# Patient Record
Sex: MALE | Race: WHITE | NOT HISPANIC OR LATINO | Employment: OTHER | URBAN - METROPOLITAN AREA
[De-identification: names, ages, dates, MRNs, and addresses within clinical notes are randomized per-mention and may not be internally consistent; named-entity substitution may affect disease eponyms.]

---

## 2017-01-19 ENCOUNTER — COMMUNICATION - HEALTHEAST (OUTPATIENT)
Dept: CARDIOLOGY | Facility: CLINIC | Age: 77
End: 2017-01-19

## 2017-01-19 DIAGNOSIS — I48.91 ATRIAL FIBRILLATION WITH RAPID VENTRICULAR RESPONSE (H): ICD-10-CM

## 2017-03-22 ENCOUNTER — OFFICE VISIT - HEALTHEAST (OUTPATIENT)
Dept: CARDIOLOGY | Facility: CLINIC | Age: 77
End: 2017-03-22

## 2017-03-22 DIAGNOSIS — R42 DIZZINESS: ICD-10-CM

## 2017-03-22 DIAGNOSIS — I42.8 NONISCHEMIC CARDIOMYOPATHY (H): ICD-10-CM

## 2017-03-22 DIAGNOSIS — G47.33 OSA ON CPAP: ICD-10-CM

## 2017-03-22 DIAGNOSIS — I48.0 PAROXYSMAL ATRIAL FIBRILLATION (H): ICD-10-CM

## 2017-03-22 LAB
ATRIAL RATE - MUSE: 500 BPM
DIASTOLIC BLOOD PRESSURE - MUSE: NORMAL MMHG
INTERPRETATION ECG - MUSE: NORMAL
P AXIS - MUSE: NORMAL DEGREES
PR INTERVAL - MUSE: NORMAL MS
QRS DURATION - MUSE: 132 MS
QT - MUSE: 426 MS
QTC - MUSE: 456 MS
R AXIS - MUSE: 51 DEGREES
SYSTOLIC BLOOD PRESSURE - MUSE: NORMAL MMHG
T AXIS - MUSE: 11 DEGREES
VENTRICULAR RATE- MUSE: 69 BPM

## 2017-03-22 ASSESSMENT — MIFFLIN-ST. JEOR: SCORE: 1819.42

## 2017-03-24 ENCOUNTER — HOSPITAL ENCOUNTER (OUTPATIENT)
Dept: CARDIOLOGY | Facility: CLINIC | Age: 77
Discharge: HOME | End: 2017-03-24
Attending: INTERNAL MEDICINE

## 2017-03-24 DIAGNOSIS — I48.0 PAROXYSMAL ATRIAL FIBRILLATION (H): ICD-10-CM

## 2017-04-05 ENCOUNTER — COMMUNICATION - HEALTHEAST (OUTPATIENT)
Dept: CARDIOLOGY | Facility: CLINIC | Age: 77
End: 2017-04-05

## 2017-04-05 DIAGNOSIS — I48.91 ATRIAL FIBRILLATION WITH RAPID VENTRICULAR RESPONSE (H): ICD-10-CM

## 2017-04-19 ENCOUNTER — OFFICE VISIT - HEALTHEAST (OUTPATIENT)
Dept: CARDIOLOGY | Facility: CLINIC | Age: 77
End: 2017-04-19

## 2017-04-19 DIAGNOSIS — G47.33 OSA ON CPAP: ICD-10-CM

## 2017-04-19 DIAGNOSIS — I48.19 PERSISTENT ATRIAL FIBRILLATION (H): ICD-10-CM

## 2017-04-19 DIAGNOSIS — I42.8 NONISCHEMIC CARDIOMYOPATHY (H): ICD-10-CM

## 2017-04-19 ASSESSMENT — MIFFLIN-ST. JEOR: SCORE: 1819.42

## 2017-07-11 ENCOUNTER — COMMUNICATION - HEALTHEAST (OUTPATIENT)
Dept: CARDIOLOGY | Facility: CLINIC | Age: 77
End: 2017-07-11

## 2017-07-11 DIAGNOSIS — I48.91 ATRIAL FIBRILLATION WITH RAPID VENTRICULAR RESPONSE (H): ICD-10-CM

## 2017-08-21 ENCOUNTER — COMMUNICATION - HEALTHEAST (OUTPATIENT)
Dept: ADMINISTRATIVE | Facility: CLINIC | Age: 77
End: 2017-08-21

## 2017-09-21 ENCOUNTER — OFFICE VISIT - HEALTHEAST (OUTPATIENT)
Dept: CARDIOLOGY | Facility: CLINIC | Age: 77
End: 2017-09-21

## 2017-09-21 DIAGNOSIS — I42.8 NONISCHEMIC CARDIOMYOPATHY (H): ICD-10-CM

## 2017-09-21 DIAGNOSIS — I48.20 CHRONIC ATRIAL FIBRILLATION (H): ICD-10-CM

## 2017-09-21 DIAGNOSIS — G47.33 OSA ON CPAP: ICD-10-CM

## 2017-09-21 ASSESSMENT — MIFFLIN-ST. JEOR: SCORE: 1821.69

## 2017-10-18 ENCOUNTER — AMBULATORY - HEALTHEAST (OUTPATIENT)
Dept: CARDIOLOGY | Facility: CLINIC | Age: 77
End: 2017-10-18

## 2017-10-18 DIAGNOSIS — I50.31 CHF (CONGESTIVE HEART FAILURE), NYHA CLASS I, ACUTE, DIASTOLIC (H): ICD-10-CM

## 2017-10-18 DIAGNOSIS — Z00.6 CLINICAL TRIAL PARTICIPANT: ICD-10-CM

## 2017-12-15 ENCOUNTER — COMMUNICATION - HEALTHEAST (OUTPATIENT)
Dept: CARDIOLOGY | Facility: CLINIC | Age: 77
End: 2017-12-15

## 2018-01-03 ENCOUNTER — RECORDS - HEALTHEAST (OUTPATIENT)
Dept: ADMINISTRATIVE | Facility: OTHER | Age: 78
End: 2018-01-03

## 2018-01-08 ASSESSMENT — MIFFLIN-ST. JEOR: SCORE: 1808.08

## 2018-01-11 ENCOUNTER — ANESTHESIA - HEALTHEAST (OUTPATIENT)
Dept: SURGERY | Facility: CLINIC | Age: 78
End: 2018-01-11

## 2018-01-11 ENCOUNTER — SURGERY - HEALTHEAST (OUTPATIENT)
Dept: SURGERY | Facility: CLINIC | Age: 78
End: 2018-01-11

## 2018-01-12 ASSESSMENT — MIFFLIN-ST. JEOR: SCORE: 1808.08

## 2018-02-05 ENCOUNTER — COMMUNICATION - HEALTHEAST (OUTPATIENT)
Dept: CARDIOLOGY | Facility: CLINIC | Age: 78
End: 2018-02-05

## 2018-02-05 DIAGNOSIS — I48.91 ATRIAL FIBRILLATION WITH RAPID VENTRICULAR RESPONSE (H): ICD-10-CM

## 2018-02-27 ENCOUNTER — COMMUNICATION - HEALTHEAST (OUTPATIENT)
Dept: CARDIOLOGY | Facility: CLINIC | Age: 78
End: 2018-02-27

## 2018-03-07 ENCOUNTER — COMMUNICATION - HEALTHEAST (OUTPATIENT)
Dept: CARDIOLOGY | Facility: CLINIC | Age: 78
End: 2018-03-07

## 2018-03-07 DIAGNOSIS — I48.19 PERSISTENT ATRIAL FIBRILLATION (H): ICD-10-CM

## 2018-05-16 ENCOUNTER — COMMUNICATION - HEALTHEAST (OUTPATIENT)
Dept: CARDIOLOGY | Facility: CLINIC | Age: 78
End: 2018-05-16

## 2018-08-07 ENCOUNTER — OFFICE VISIT - HEALTHEAST (OUTPATIENT)
Dept: CARDIOLOGY | Facility: CLINIC | Age: 78
End: 2018-08-07

## 2018-08-07 DIAGNOSIS — I42.8 NONISCHEMIC CARDIOMYOPATHY (H): ICD-10-CM

## 2018-08-07 DIAGNOSIS — I48.19 PERSISTENT ATRIAL FIBRILLATION (H): ICD-10-CM

## 2018-08-07 DIAGNOSIS — I50.9 CONGESTIVE HEART FAILURE (H): ICD-10-CM

## 2018-08-07 DIAGNOSIS — G47.33 OSA ON CPAP: ICD-10-CM

## 2018-08-07 LAB
ANION GAP SERPL CALCULATED.3IONS-SCNC: 9 MMOL/L (ref 5–18)
BNP SERPL-MCNC: 81 PG/ML (ref 0–76)
BUN SERPL-MCNC: 18 MG/DL (ref 8–28)
CALCIUM SERPL-MCNC: 9.6 MG/DL (ref 8.5–10.5)
CHLORIDE BLD-SCNC: 105 MMOL/L (ref 98–107)
CO2 SERPL-SCNC: 23 MMOL/L (ref 22–31)
CREAT SERPL-MCNC: 0.87 MG/DL (ref 0.7–1.3)
GFR SERPL CREATININE-BSD FRML MDRD: >60 ML/MIN/1.73M2
GLUCOSE BLD-MCNC: 109 MG/DL (ref 70–125)
POTASSIUM BLD-SCNC: 4.2 MMOL/L (ref 3.5–5)
SODIUM SERPL-SCNC: 137 MMOL/L (ref 136–145)

## 2018-08-07 RX ORDER — KETOROLAC TROMETHAMINE 4 MG/ML
1 SOLUTION/ DROPS OPHTHALMIC 2 TIMES DAILY
Status: SHIPPED | COMMUNITY
Start: 2018-08-06 | End: 2022-10-10

## 2018-08-07 RX ORDER — PREDNISOLONE ACETATE 10 MG/ML
1 SUSPENSION/ DROPS OPHTHALMIC 2 TIMES DAILY
Status: SHIPPED | COMMUNITY
Start: 2018-07-24 | End: 2022-10-10

## 2018-08-07 RX ORDER — LATANOPROST 50 UG/ML
1 SOLUTION/ DROPS OPHTHALMIC AT BEDTIME
Status: SHIPPED | COMMUNITY
Start: 2018-06-18

## 2018-08-07 ASSESSMENT — MIFFLIN-ST. JEOR: SCORE: 1839.83

## 2018-08-22 ENCOUNTER — HOSPITAL ENCOUNTER (OUTPATIENT)
Dept: CARDIOLOGY | Facility: CLINIC | Age: 78
Discharge: HOME | End: 2018-08-22
Attending: INTERNAL MEDICINE

## 2018-08-22 DIAGNOSIS — I42.8 NONISCHEMIC CARDIOMYOPATHY (H): ICD-10-CM

## 2018-08-22 LAB
AORTIC VALVE MEAN VELOCITY: 139 CM/S
AV DIMENSIONLESS INDEX VTI: 0.4
AV MEAN GRADIENT: 9 MMHG
AV PEAK GRADIENT: 17.8 MMHG
AV VALVE AREA: 1.3 CM2
AV VELOCITY RATIO: 0.5
BSA FOR ECHO PROCEDURE: 2.34 M2
CV ECHO HEIGHT: 71 IN
CV ECHO WEIGHT: 242 LBS
DOP CALC AO PEAK VEL: 211 CM/S
DOP CALC AO VTI: 43.4 CM
DOP CALC LVOT AREA: 3.14 CM2
DOP CALC LVOT DIAMETER: 2 CM
DOP CALC LVOT PEAK VEL: 96.3 CM/S
DOP CALC LVOT STROKE VOLUME: 57.8 CM3
DOP CALCLVOT PEAK VEL VTI: 18.4 CM
ECHO EJECTION FRACTION ESTIMATED: 50 %
FRACTIONAL SHORTENING: 24.6 % (ref 28–44)
INTERVENTRICULAR SEPTUM IN END DIASTOLE: 1.49 CM (ref 0.6–1)
IVS/PW RATIO: 1
LA AREA 1: 27.4 CM2
LA AREA 2: 28.6 CM2
LEFT ATRIUM LENGTH: 6.33 CM
LEFT ATRIUM VOLUME INDEX: 45 ML/M2
LEFT ATRIUM VOLUME: 105.2 ML
LEFT VENTRICLE CARDIAC INDEX: 2.1 L/MIN/M2
LEFT VENTRICLE CARDIAC OUTPUT: 4.9 L/MIN
LEFT VENTRICLE HEART RATE: 85 BPM
LEFT VENTRICLE MASS INDEX: 134.1 G/M2
LEFT VENTRICULAR INTERNAL DIMENSION IN DIASTOLE: 4.91 CM (ref 4.2–5.8)
LEFT VENTRICULAR INTERNAL DIMENSION IN SYSTOLE: 3.7 CM (ref 2.5–4)
LEFT VENTRICULAR MASS: 313.9 G
LEFT VENTRICULAR OUTFLOW TRACT MEAN GRADIENT: 2 MMHG
LEFT VENTRICULAR OUTFLOW TRACT MEAN VELOCITY: 68 CM/S
LEFT VENTRICULAR OUTFLOW TRACT PEAK GRADIENT: 4 MMHG
LEFT VENTRICULAR POSTERIOR WALL IN END DIASTOLE: 1.51 CM (ref 0.6–1)
LV STROKE VOLUME INDEX: 24.7 ML/M2
MV AVERAGE E/E' RATIO: 12.3 CM/S
MV DECELERATION TIME: 194 MS
MV E'TISSUE VEL-LAT: 10 CM/S
MV E'TISSUE VEL-MED: 7.99 CM/S
MV LATERAL E/E' RATIO: 11.1
MV MEDIAL E/E' RATIO: 13.9
MV PEAK E VELOCITY: 111 CM/S
NUC REST DIASTOLIC VOLUME INDEX: 3872 LBS
NUC REST SYSTOLIC VOLUME INDEX: 71 IN
PR MAX PG: 6 MMHG
PR PEAK VELOCITY: 126 CM/S
TRICUSPID REGURGITATION PEAK PRESSURE GRADIENT: 25.4 MMHG
TRICUSPID VALVE ANULAR PLANE SYSTOLIC EXCURSION: 1.7 CM
TRICUSPID VALVE PEAK REGURGITANT VELOCITY: 252 CM/S

## 2018-08-22 ASSESSMENT — MIFFLIN-ST. JEOR: SCORE: 1839.83

## 2018-09-05 ENCOUNTER — COMMUNICATION - HEALTHEAST (OUTPATIENT)
Dept: CARDIOLOGY | Facility: CLINIC | Age: 78
End: 2018-09-05

## 2018-09-05 DIAGNOSIS — I48.19 PERSISTENT ATRIAL FIBRILLATION (H): ICD-10-CM

## 2018-09-05 DIAGNOSIS — I48.91 ATRIAL FIBRILLATION WITH RAPID VENTRICULAR RESPONSE (H): ICD-10-CM

## 2018-10-10 ENCOUNTER — AMBULATORY - HEALTHEAST (OUTPATIENT)
Dept: CARDIOLOGY | Facility: CLINIC | Age: 78
End: 2018-10-10

## 2018-10-10 ASSESSMENT — MIFFLIN-ST. JEOR: SCORE: 1817.15

## 2019-02-25 ENCOUNTER — COMMUNICATION - HEALTHEAST (OUTPATIENT)
Dept: CARDIOLOGY | Facility: CLINIC | Age: 79
End: 2019-02-25

## 2019-02-25 DIAGNOSIS — I48.91 ATRIAL FIBRILLATION WITH RAPID VENTRICULAR RESPONSE (H): ICD-10-CM

## 2019-03-19 ENCOUNTER — OFFICE VISIT - HEALTHEAST (OUTPATIENT)
Dept: CARDIOLOGY | Facility: CLINIC | Age: 79
End: 2019-03-19

## 2019-03-19 DIAGNOSIS — I48.20 CHRONIC ATRIAL FIBRILLATION (H): ICD-10-CM

## 2019-03-19 DIAGNOSIS — I42.8 NONISCHEMIC CARDIOMYOPATHY (H): ICD-10-CM

## 2019-03-19 DIAGNOSIS — G47.33 OSA (OBSTRUCTIVE SLEEP APNEA): ICD-10-CM

## 2019-03-19 ASSESSMENT — MIFFLIN-ST. JEOR: SCORE: 1871.59

## 2019-03-20 ENCOUNTER — COMMUNICATION - HEALTHEAST (OUTPATIENT)
Dept: CARDIOLOGY | Facility: CLINIC | Age: 79
End: 2019-03-20

## 2019-03-25 ENCOUNTER — COMMUNICATION - HEALTHEAST (OUTPATIENT)
Dept: CARDIOLOGY | Facility: CLINIC | Age: 79
End: 2019-03-25

## 2019-03-25 DIAGNOSIS — I48.19 PERSISTENT ATRIAL FIBRILLATION (H): ICD-10-CM

## 2019-03-25 RX ORDER — METOPROLOL SUCCINATE 25 MG/1
TABLET, EXTENDED RELEASE ORAL
Qty: 90 TABLET | Refills: 1 | Status: ON HOLD | OUTPATIENT
Start: 2019-03-25 | End: 2022-10-12

## 2019-04-22 ENCOUNTER — COMMUNICATION - HEALTHEAST (OUTPATIENT)
Dept: CARDIOLOGY | Facility: CLINIC | Age: 79
End: 2019-04-22

## 2019-08-07 ENCOUNTER — COMMUNICATION - HEALTHEAST (OUTPATIENT)
Dept: CARDIOLOGY | Facility: CLINIC | Age: 79
End: 2019-08-07

## 2019-08-07 DIAGNOSIS — I48.91 ATRIAL FIBRILLATION WITH RAPID VENTRICULAR RESPONSE (H): ICD-10-CM

## 2019-10-16 ENCOUNTER — COMMUNICATION - HEALTHEAST (OUTPATIENT)
Dept: CARDIOLOGY | Facility: CLINIC | Age: 79
End: 2019-10-16

## 2019-10-16 RX ORDER — FUROSEMIDE 20 MG
20 TABLET ORAL DAILY PRN
Status: ON HOLD | COMMUNITY
Start: 2019-10-16 | End: 2024-07-14

## 2019-10-18 ENCOUNTER — AMBULATORY - HEALTHEAST (OUTPATIENT)
Dept: CARDIOLOGY | Facility: CLINIC | Age: 79
End: 2019-10-18

## 2019-10-18 DIAGNOSIS — Z00.6 CLINICAL TRIAL PARTICIPANT: ICD-10-CM

## 2019-10-18 DIAGNOSIS — I48.20 CHRONIC ATRIAL FIBRILLATION (H): ICD-10-CM

## 2019-10-18 DIAGNOSIS — I48.19 PERSISTENT ATRIAL FIBRILLATION (H): ICD-10-CM

## 2019-10-18 ASSESSMENT — MIFFLIN-ST. JEOR: SCORE: 1813.75

## 2019-10-19 LAB
ATRIAL RATE - MUSE: 33 BPM
DIASTOLIC BLOOD PRESSURE - MUSE: NORMAL
INTERPRETATION ECG - MUSE: NORMAL
P AXIS - MUSE: NORMAL
PR INTERVAL - MUSE: NORMAL
QRS DURATION - MUSE: 158 MS
QT - MUSE: 430 MS
QTC - MUSE: 477 MS
R AXIS - MUSE: 263 DEGREES
SYSTOLIC BLOOD PRESSURE - MUSE: NORMAL
T AXIS - MUSE: 8 DEGREES
VENTRICULAR RATE- MUSE: 74 BPM

## 2019-10-21 ENCOUNTER — AMBULATORY - HEALTHEAST (OUTPATIENT)
Dept: CARDIOLOGY | Facility: CLINIC | Age: 79
End: 2019-10-21

## 2019-10-23 ENCOUNTER — COMMUNICATION - HEALTHEAST (OUTPATIENT)
Dept: CARDIOLOGY | Facility: CLINIC | Age: 79
End: 2019-10-23

## 2021-05-27 NOTE — TELEPHONE ENCOUNTER
Called patient with recommendation below.    He is thankful for the advise and will return the call for an appointment.  His insurance is changing and he is not entirely sure he will be able to stay with [facility].     Encouraged to call with any further questions or concerns.

## 2021-05-27 NOTE — TELEPHONE ENCOUNTER
----- Message from Ashley Leon CNP sent at 3/25/2019 11:05 AM CDT -----  Contact: Pt  Please let Samantha know I received his message.  I think Dr. Malcolm Mitchell would be a good doctor for him to continue his cardiology care with.  If he has further questions or concerns, I can call him.       ----- Message -----  From: Wellington Kovacs  Sent: 3/25/2019   9:36 AM  To: Carrie Chew RN, #    General phone call:    Caller: Pt    Primary cardiologist: Dr Mitchell / Ashley Leon    Detailed reason for call: Pt wanted a call from Ashley if possible ------ in regards to a recommendation - Pt states Dr Feliz Mitchell is retiring and wanted to hear from Ashley as to whom would be best to see him - (Pt was referred to WERO Mitchell but wanted to check with Ashley)    Asking for a call back when possible please    New or active symptoms? na    Best phone number: [phone]    Best time to contact: any  Ok to leave a detailed message? Yes  Device? No    Additional Info: Thank you

## 2021-05-28 NOTE — TELEPHONE ENCOUNTER
----- Message from Ashley Leon CNP sent at 4/18/2019  2:32 PM CDT -----  I had a message on my VM from Samantha.  He saw Dr. Mitchell in March and knows that he needs to find a new cardiologist for follow up next March since Dr. Mitchell is retiring.  He is concerned about his refills (Dr. Mitchell only put in for 90 day supply and 1 refill).     Please call him and let him know refills can be for 1 year since he saw Dr. Mitchell.  I can see him in 6 months for HF follow up and I will make sure he gets a new cardiologist.    Thanks

## 2021-05-30 VITALS — HEIGHT: 72 IN | BODY MASS INDEX: 31.69 KG/M2 | WEIGHT: 234 LBS

## 2021-05-30 VITALS — HEIGHT: 72 IN | WEIGHT: 234 LBS | BODY MASS INDEX: 31.69 KG/M2

## 2021-05-31 VITALS — WEIGHT: 231 LBS | BODY MASS INDEX: 30.62 KG/M2 | HEIGHT: 73 IN

## 2021-05-31 VITALS — WEIGHT: 235 LBS | HEIGHT: 71 IN | BODY MASS INDEX: 32.9 KG/M2

## 2021-05-31 NOTE — TELEPHONE ENCOUNTER
Left VM for pt that refill sent for Eliquis. Informed him he needs lab work for Eliquis with new Cards MD.

## 2021-05-31 NOTE — TELEPHONE ENCOUNTER
----- Message from Wellington Kovacs sent at 8/7/2019  9:50 AM CDT -----  Contact: Pt  Patient has already contacted their pharmacy. The medication or refill issue is below:    Primary Cardiologist: Dr Mitchell    Medication: ELIQUIS 5 mg Tab tablet     Issue / Concern: Needing refill - If able to do 90 days that would be great    Preferred Pharmacy: [facility] - [place], [place] - [address] [address]    Best Phone Number for Patient: home    Additional Info: Pt did state that per an insurance change and that Dr Mitchell is retiring soon he will be seeing a new cardiologist later this month- just wanted us to be aware [facility]

## 2021-06-01 VITALS — BODY MASS INDEX: 33.88 KG/M2 | WEIGHT: 242 LBS | HEIGHT: 71 IN

## 2021-06-01 VITALS — BODY MASS INDEX: 33.18 KG/M2 | WEIGHT: 237 LBS | HEIGHT: 71 IN

## 2021-06-02 VITALS — BODY MASS INDEX: 34.86 KG/M2 | WEIGHT: 249 LBS | HEIGHT: 71 IN

## 2021-06-02 NOTE — TELEPHONE ENCOUNTER
Zestar Study Consent Visit    Study description: ECG and PPG Study: Zestar Study      Samantha Ramos a 78 y.o. male , was contacted by today to discuss participation in the Zestar study.     The patient called the Clinical Trials Office to inquire about study participation.  The consent form was reviewed with the patient.  The review of the study included:    Study purpose     Conflict of interest    Device description      Study visits    Risks of participation    Benefits (if any)    Alternatives    Voluntary participation    Confidentiality     Compensation/costs of participation    Study stipends    Injury and legal rights    The subject was queried in regards to his willingness to continue and come in for scheduled appointment. his questions were answered to his satisfaction.   The patient has given his preliminary agreement to volunteer to participate in the above noted study.     Plan: Samantha Ramos will come to [facility] on 10/18/19 to continue consent process. If he continues to agrees to participate, the study visit will be done on the same day.    Sri Huntley RN

## 2021-06-02 NOTE — TELEPHONE ENCOUNTER
Subject called with concerns over vibrations on day phone and other general questions. All were answered to subject's satisfaction.

## 2021-06-02 NOTE — TELEPHONE ENCOUNTER
Mr. Singh was contact to thank him for his participation in the INVESTED study and inform Mr. Singh that the Data and Safety Monitoring Committee, impartial group that oversees the clinical trial, has decided that the trial has already collected enough information to answer the study question. Therefore, the National Institutes of Health ([facility]), the agency that that is sponsoring the trial, has decided to stop the trial early. This means we will not be enrolling patients this year and unfortunately, [facility] cannot provide you with a study influenza vaccine. Fortunately, there is no concern about the safety of either study vaccine. Getting your flu shot is important in order to keep you healthy this upcoming winter and spring. We recommend you get your annual flu shot from your health care provider.     Lawanda Daley

## 2021-06-03 VITALS
SYSTOLIC BLOOD PRESSURE: 123 MMHG | RESPIRATION RATE: 15 BRPM | HEART RATE: 70 BPM | HEIGHT: 70 IN | WEIGHT: 238.1 LBS | TEMPERATURE: 97.7 F | BODY MASS INDEX: 34.09 KG/M2 | DIASTOLIC BLOOD PRESSURE: 72 MMHG

## 2021-06-09 NOTE — PROGRESS NOTES
[facility] Progress Note    Assessment:    1.  Dizziness need to rule out periods of bradycardia as an etiology  2.  Recurrent persistent atrial fibrillation with good rate control  3.  Mild nonischemic cardiomyopathy compensated  4.  Obstructive sleep apnea using nocturnal CPAP    Plan:    We'll continue Eliquis therapy.  Will discontinue his metoprolol and evaluate for rate control off of metoprolol with a Holter monitor in the near future.  We'll obtain a follow-up echocardiogram in 6 months    An After Visit Summary was printed and given to the patient.    Subjective:    76-year-old male who was found to have atrial fibrillation one year ago undergone electrical cardioversion in March 2016.  Patient was noted have mild to moderate decrease in left ventricular systolic function with improvement in follow-up echocardiogram in August 2016 with ejection fraction of 45%.  Patient subsequently has been on metoprolol at 25 mg twice a day.  2-1/2 weeks ago he began having periods of dizziness lasting less than a minute this would occur upon waking up at night or occur with standing up in the morning or at various periods during the day.  These episodes of dizziness could occur every other day and sometimes twice a day without feeling of syncope or near-syncope.  Patient describes his dizziness as a feeling that his head is swirling without other associated symptoms      The patient had coronary CT angiography with calcium score in February 2016 with a calcium score 1 and no significant coronary artery stenosis present    Problem List:    Patient Active Problem List   Diagnosis     Paroxysmal atrial fibrillation     Congestive heart failure     JONATHAN on CPAP     Nonischemic cardiomyopathy         Current Outpatient Prescriptions   Medication Sig Dispense Refill     apixaban (ELIQUIS) 5 mg Tab tablet Take 1 tablet (5 mg total) by mouth 2 (two) times a day. 204 tablet 3     cholecalciferol, vitamin D3,  1,000 unit tablet Take 1,000 Units by mouth daily.       docoshexanoic acid-eicosapent 500 mg (FISH OIL) 500-100 mg cap capsule Take 1,000 mg by mouth daily.        GLUCOSAMINE HCL/CHONDR THOMAS A NA (OSTEO BI-FLEX ORAL) Take 1 capsule by mouth daily.       metoprolol tartrate (LOPRESSOR) 50 MG tablet Take 0.5 tablets (25 mg total) by mouth 2 (two) times a day. 90 tablet 2     multivitamin therapeutic (THERAGRAN) tablet Take 1 tablet by mouth daily.       omeprazole (PRILOSEC) 10 MG capsule Take 10 mg by mouth daily as needed. Takes a second dose of omeprazole later in the day if needed       No current facility-administered medications for this visit.        .  Past Surgical History:   Procedure Laterality Date     JOINT REPLACEMENT Left     knee       .  Social History     Social History     Marital status:      Spouse name: N/A     Number of children: N/A     Years of education: N/A     Occupational History     Not on file.     Social History Main Topics     Smoking status: Former Smoker     Packs/day: 3.00     Quit date: 9/10/1988     Smokeless tobacco: Not on file     Alcohol use 2.4 oz/week     4 Cans of beer per week      Comment: 4 beers a day     Drug use: No     Sexual activity: Not on file     Other Topics Concern     Not on file     Social History Narrative       .  Family History   Problem Relation Age of Onset     Pacemaker Father      Review of Systems:  General: WNL  Eyes: WNL  Ears/Nose/Throat: WNL  Lungs: WNL  Heart: WNL  Stomach: WNL  Bladder: WNL  Muscle/Joints: WNL  Skin: WNL  Nervous System: Dizziness  Mental Health: WNL     Blood: WNL    Objective:     Visit Vitals     /68 (Patient Site: Right Arm, Patient Position: Sitting, Cuff Size: Adult Large)     Pulse 60     Resp 18     Ht 6' (1.829 m)     Wt (!) 234 lb (106.1 kg)     BMI 31.74 kg/m2     (!) 234 lb (106.1 kg)   [unfilled]    Physical Exam:    GENERAL APPEARANCE: alert, no apparent distress  HEENT: no scleral icterus or  xanthelasma  NECK: jugular venous pressure normal  CHEST: symmetric, the lungs were clear to auscultation  CARDIOVASCULAR: irregular rhythm without murmur, click, or gallop; no carotid bruits  ABDOMEN: nondistended, nontender, bowel sounds present  EXTREMITIES: no cyanosis, clubbing or edema.    Cardiac Testing:    echocardiogram last performed August 15, 2016 with mild global hypokinesis with ejection fraction of 45%      ECG done in the office today revealed atrial fibrillation with a ventricular response of 69 bpm      Lab Results:    LIPIDS:  Lab Results   Component Value Date    CHOL 179 02/16/2016     Lab Results   Component Value Date    HDL 66 02/16/2016     Lab Results   Component Value Date    LDLCALC 96 02/16/2016     Lab Results   Component Value Date    TRIG 85 02/16/2016     No components found for: CHOLHDL    BMP:  Lab Results   Component Value Date    CREATININE 1.23 03/03/2016    BUN 30 (H) 03/03/2016     03/03/2016    K 4.9 03/03/2016     03/03/2016    CO2 25 03/03/2016         Feliz Mitchell MD,F.A.C.C.  [facility]  [phone]

## 2021-06-10 NOTE — PROGRESS NOTES
[facility]    Assessment/Plan:      Problem List Items Addressed This Visit     Persistent atrial fibrillation - Primary    Relevant Medications    metoprolol succinate (TOPROL-XL) 25 MG    JONATHAN on CPAP    Nonischemic cardiomyopathy    Relevant Medications    metoprolol succinate (TOPROL-XL) 25 MG         1.  Persistent atrial fibrillation: Recurrence noted in March 2017 with unknown onset.  Ventricular response appears well controlled with low-dose metoprolol.  He is asymptomatic, so recommend rate control.  Due to his tendency for lightheadedness, switch to metoprolol succinate 25 mg daily to be taken at night.  His lightheadedness has essentially resolved with increasing his water intake; he was encouraged to significantly increase his daily fluid intake and limit his alcohol consumption.  Continue Eliquis 5 mg twice daily for stroke prophylaxis.    2.  Obstructive sleep apnea: Consistent use of CPAP nightly.    3.  Nonischemic cardiomyopathy: Appears well compensated with no sign of acute fluid overload on exam today.      Follow-up with Dr. Mitchell in 3 months.    Subjective:      Samantha Ramos, a very pleasant 76-year-old gentleman, comes in today for EP evaluation of atrial fibrillation.  He has a history of persistent atrial fibrillation newly diagnosed in February 2016 which he underwent cardioversion on 3/18/2016.  Reports no symptomatic improvement following cardioversion.  He having periods of lightheadedness and was noted to have recurrence of atrial fibrillation on 3/22/2017, with unknown onset.  Metoprolol was discontinued and he subsequently wore a Holter monitor that showed elevated daytime ventricular response for which his metoprolol tartrate was restarted at 12.5 mg twice daily.  He has a history of lightheadedness associated with dehydration and hypotension, mild nonischemic cardiomyopathy, and obstructive sleep apnea for which he wears CPAP nightly.    Samantha states that he has no  awareness of arrhythmia, no decrease in activity tolerance, and that his lightheadedness has significantly improved.  He has started increasing his daily fluid intake as he was previously drinking less than half a glass of water daily.  He denies chest discomfort, palpitations, shortness of breath, paroxysmal nocturnal dyspnea, orthopnea, significant lightheadedness, dizziness, pre-syncope, or syncope.    Medical, surgical, family, social history, and medications were reviewed and updated as necessary.    Patient Active Problem List   Diagnosis     Persistent atrial fibrillation     Congestive heart failure     JONATHAN on CPAP     Nonischemic cardiomyopathy       Past Medical History:   Diagnosis Date     Arrhythmia      Hyperlipidemia      Sleep apnea        Past Surgical History:   Procedure Laterality Date     JOINT REPLACEMENT Left     knee       Allergies   Allergen Reactions     Indocin [Indomethacin] Itching       Current Outpatient Prescriptions   Medication Sig Dispense Refill     apixaban (ELIQUIS) 5 mg Tab tablet Take 1 tablet (5 mg total) by mouth 2 (two) times a day. 204 tablet 3     cholecalciferol, vitamin D3, 1,000 unit tablet Take 1,000 Units by mouth daily.       cyanocobalamin 1000 MCG tablet Take 1,000 mcg by mouth daily.       docoshexanoic acid-eicosapent 500 mg (FISH OIL) 500-100 mg cap capsule Take 1,000 mg by mouth daily.        GLUCOSAMINE HCL/CHONDR THOMAS A NA (OSTEO BI-FLEX ORAL) Take 1 capsule by mouth daily.       multivitamin therapeutic (THERAGRAN) tablet Take 1 tablet by mouth daily.       omeprazole (PRILOSEC) 10 MG capsule Take 10 mg by mouth daily as needed. Takes a second dose of omeprazole later in the day if needed       metoprolol succinate (TOPROL-XL) 25 MG Take 1 tablet (25 mg total) by mouth daily. 90 tablet 3     No current facility-administered medications for this visit.        Family History   Problem Relation Age of Onset     Pacemaker Father        Social History   Substance  Use Topics     Smoking status: Former Smoker     Packs/day: 3.00     Quit date: 9/10/1988     Smokeless tobacco: None     Alcohol use 2.4 oz/week     4 Cans of beer per week      Comment: 4 beers a day       Review of Systems:   General: WNL  Eyes: Visual Distubance  Ears/Nose/Throat: WNL  Lungs: WNL  Heart: WNL  Stomach: WNL  Bladder: WNL  Muscle/Joints: WNL  Skin: Poor Wound Healing  Nervous System: Dizziness, Loss of Balance  Mental Health: WNL     Blood: WNL      Objective:    /80 (Patient Site: Left Arm, Patient Position: Sitting, Cuff Size: Adult Large)  Pulse 64  Resp 18  Ht 6' (1.829 m)  Wt (!) 234 lb (106.1 kg)  BMI 31.74 kg/m2    Physical Exam  General Appearance:  Alert, well-appearing, in no acute distress.     HEENT:  Atraumatic, normocephalic; no scleral icterus, EOM intact; the mucous membranes were pink and moist.   Chest: Chest symmetric, spine straight.     Lungs:   Respirations unlabored; the lungs are clear to auscultation.   Cardiovascular:   Auscultation reveals normal first and second heart sounds with no murmurs, rubs, or gallops.  Irregularly irregular rate and rhythm. No JVD.  Radial and posterior tibial pulses are intact.    Abdomen:  Soft, nontender, nondistended, bowel sounds present.     Extremities: No cyanosis, clubbing, or edema.   Musculoskeletal:  Moves all extremities.     Skin: No xanthelasma.   Neurologic: Mood and affect are appropriate. Oriented to person, place, time, and situation.       Cardiographics   ECG: 3/22/17 was personally reviewed, shows atrial fibrillation with controlled ventricular response at 69 bpm.    24 hour Holter monitor worn 3/24/2017 showed persistent atrial fibrillation with elevated daytime ventricular rates frequently >100 bpm.  No episodes of significant bradycardia or pauses.  No significant ventricular arrhythmia.    Echocardiogram: Done 8/15/2016  Compared with the images of the exam of February 16, 2016, there has been  a slight  improvement in systolic function with the ejection fraction  increasing from 40% to 45%.  Global hypokinesis of the left ventricle with mild impairment of systolic  function.  Left ventricular ejection fraction is visually estimated to be 45%.  Mildly enlarged left atrium.  No significant valvular abnormalities.        Angie York, [facility], Electrophysiology  [phone]    This note has been dictated using voice recognition software. Any grammatical, typographical, or context distortions are unintentional and inherent to the software.

## 2021-06-13 NOTE — PROGRESS NOTES
Baseline Visit     Invested study (trial of Fluzone Quadrivalent Influenza Vaccine vs Fluzone High-Dose Trivalent Influenza Vaccine in reducing rate of death or hospitalization from heart- or lung-related causes in individuals with heart disease at high risk for more heart related events. High-Dose Trivalent Vaccine is investigational in adults under 65.).     Consent process:  Research associate met with subject to discuss participation in the above noted study.     The study discussion continued with an introduction of the study purpose and the qualifications for participation. A review of the study procedures, risks and side effects, benefits (if any), voluntary nature of participation, confidentiality of records, and financial considerations were also included in the discussion.      Subject was provided with a copy of the consent form to review and time to consider participation. Subject agreed to participate, consent form version: 29 JUN 2017 signed. Copy to chart, copy to patient.     Subject administered study vaccine at 9:26 am in the left arm.      Plan: Call patient in 1 week      Lawanda Daley  [phone]  [email or web address]

## 2021-06-13 NOTE — PROGRESS NOTES
[facility] Progress Note    Assessment:    1.  Chronic atrial fibrillation with good rate control and low-dose metoprolol therapy  2.  Mild nonischemic cardiomyopathy with ejection fraction of 45% compensated  3.  Obstructive sleep apnea on nocturnal CPAP    Plan:    Continue the patient's current medical regimen.  Will obtain a follow-up echocardiogram in 1 year to recheck his ejection fraction    An After Visit Summary was printed and given to the patient.    Subjective:    76-year-old male who was noted to have atrial fibrillation in March 2016 undergoing electrical cardioversion.  Patient had a underlying cardiomyopathy with ejection fraction of 40% at that time.  He developed recurrent atrial fibrillation this past March and is subsequently been following a rate control strategy.  Over last 6 months he denies any symptoms of congestive heart failure or limiting dyspnea on exertion    Problem List:    Patient Active Problem List   Diagnosis     Persistent atrial fibrillation     Congestive heart failure     JONATHAN on CPAP     Nonischemic cardiomyopathy         Current Outpatient Prescriptions   Medication Sig Dispense Refill     cholecalciferol, vitamin D3, 1,000 unit tablet Take 1,000 Units by mouth daily.       cyanocobalamin 1000 MCG tablet Take 1,000 mcg by mouth daily.       docoshexanoic acid-eicosapent 500 mg (FISH OIL) 500-100 mg cap capsule Take 1,000 mg by mouth daily.        ELIQUIS 5 mg Tab tablet TAKE ONE TABLET BY MOUTH TWICE A  tablet 2     GLUCOSAMINE HCL/CHONDR THOMAS A NA (OSTEO BI-FLEX ORAL) Take 1 capsule by mouth daily.       metoprolol succinate (TOPROL-XL) 25 MG Take 1 tablet (25 mg total) by mouth daily. 90 tablet 3     multivitamin therapeutic (THERAGRAN) tablet Take 1 tablet by mouth daily.       omeprazole (PRILOSEC) 10 MG capsule Take 10 mg by mouth daily as needed. Takes a second dose of omeprazole later in the day if needed       No current facility-administered  "medications for this visit.        .  Past Surgical History:   Procedure Laterality Date     JOINT REPLACEMENT Left     knee       .  Social History     Social History     Marital status:      Spouse name: N/A     Number of children: N/A     Years of education: N/A     Occupational History     Not on file.     Social History Main Topics     Smoking status: Former Smoker     Packs/day: 3.00     Quit date: 9/10/1988     Smokeless tobacco: Not on file     Alcohol use 2.4 oz/week     4 Cans of beer per week      Comment: 4 beers a day     Drug use: No     Sexual activity: Not on file     Other Topics Concern     Not on file     Social History Narrative       .  Family History   Problem Relation Age of Onset     Pacemaker Father      Review of Systems:  General: WNL  Eyes: WNL  Ears/Nose/Throat: Hearing Loss  Lungs: Shortness of Breath  Heart: Irregular Heartbeat  Stomach: Heartburn  Bladder: WNL  Muscle/Joints: Muscle Pain, WNL     Nervous System: Dizziness, Daytime Sleepiness  Mental Health: WNL     Blood: WNL    Objective:     /82 (Patient Site: Right Arm, Patient Position: Sitting, Cuff Size: Adult Large)  Pulse 68  Resp 18  Ht 6' 1\" (1.854 m)  Wt (!) 231 lb (104.8 kg)  BMI 30.48 kg/m2  (!) 231 lb (104.8 kg)   [unfilled]    Physical Exam:    GENERAL APPEARANCE: alert, no apparent distress  HEENT: no scleral icterus or xanthelasma  NECK: jugular venous pressure normal  CHEST: symmetric, the lungs were clear to auscultation  CARDIOVASCULAR: irregular rhythm without murmur, click, or gallop; no carotid bruits  ABDOMEN: nondistended, nontender, bowel sounds present  EXTREMITIES: no cyanosis, clubbing or edema.    Cardiac Testing:    echocardiogram from August 15, 2016 ejection fraction of 45% increase from the previous echocardiogram with mild left atrial enlargement and no significant valvular abnormalities      Lab Results:    LIPIDS:  Lab Results   Component Value Date    CHOL 179 02/16/2016 "     Lab Results   Component Value Date    HDL 66 02/16/2016     Lab Results   Component Value Date    LDLCALC 96 02/16/2016     Lab Results   Component Value Date    TRIG 85 02/16/2016     No components found for: CHOLHDL    BMP:  Lab Results   Component Value Date    CREATININE 1.23 03/03/2016    BUN 30 (H) 03/03/2016     03/03/2016    K 4.9 03/03/2016     03/03/2016    CO2 25 03/03/2016         Feliz Mitchell MD,F.A.C.C.  [facility]  [phone]

## 2021-06-15 NOTE — ANESTHESIA PREPROCEDURE EVALUATION
Anesthesia Evaluation      Patient summary reviewed   No history of anesthetic complications     Airway   Mallampati: II  Neck ROM: full   Pulmonary - negative ROS and normal exam   (+) sleep apnea on CPAP, ,                          Cardiovascular - negative ROS and normal exam  (+) dysrhythmias (A. Fib), CHF, cardiomyopathy, hypercholesterolemia,      Neuro/Psych - negative ROS     Endo/Other - negative ROS   (+) obesity,      GI/Hepatic/Renal - negative ROS   (+) GERD well controlled,             Dental - normal exam   (+) lower dentures and upper dentures                       Anesthesia Plan  Planned anesthetic: spinal and peripheral nerve block  Peripheral nerve block for post-op analgesia as ordered by surgeon.  ASA 3     Anesthetic plan and risks discussed with: patient and child/children    Post-op plan: routine recovery

## 2021-06-15 NOTE — ANESTHESIA PROCEDURE NOTES
Peripheral Block    Patient location during procedure: pre-op  Start time: 1/11/2018 1:25 PM  End time: 1/11/2018 1:30 PM  post-op analgesia per surgeon order as noted in medical record  Staffing:  Performing  Anesthesiologist: LANG JARA  Preanesthetic Checklist  Completed: patient identified, site marked, risks, benefits, and alternatives discussed, timeout performed, consent obtained, airway assessed, oxygen available, suction available, emergency drugs available and hand hygiene performed  Peripheral Block  Block type: saphenous, adductor canal block  Prep: ChloraPrep  Patient position: supine  Patient monitoring: blood pressure, heart rate, continuous pulse oximetry and cardiac monitor  Laterality: right  Injection technique: ultrasound guided    Ultrasound used to visualize needle placement in proximity to nerve being blocked: yes   Permanent ultrasound image captured for medical record    Needle  Needle type: Stimuplex   Needle gauge: 20G  Needle length: 6 in  no peripheral nerve catheter placed  Assessment  Injection assessment: negative aspiration for heme, no paresthesia on injection, incremental injection and no difficulty with injection  Additional Notes  15ml 0.5% bupivacaine w/ epi 1:200K injected for nerve block.

## 2021-06-15 NOTE — ANESTHESIA POSTPROCEDURE EVALUATION
Patient: Samantha Ramos  RIGHT MINIMALLY INVASIVE TOTAL KNEE ARTHROPLASTY  Anesthesia type: spinal    Patient location: PACU  Last vitals:   Vitals:    01/11/18 1654   BP: 122/80   Pulse: 74   Resp: 17   Temp: 36.4  C (97.5  F)   SpO2: 96%     Post vital signs: stable  Level of consciousness: awake and return to baseline  Post-anesthesia pain: pain controlled  Post-anesthesia nausea and vomiting: no  Pulmonary: unassisted, return to baseline  Cardiovascular: stable and blood pressure at baseline  Hydration: adequate  Anesthetic events: no, SAB resolving    QCDR Measures:  ASA# 11 - Zeenat-op Cardiac Arrest: ASA11B - Patient did NOT experience unanticipated cardiac arrest  ASA# 12 - Zeenat-op Mortality Rate: ASA12B - Patient did NOT die  ASA# 13 - PACU Re-Intubation Rate: NA - No ETT / LMA used for case  ASA# 10 - Composite Anes Safety: ASA10A - No serious adverse event    Additional Notes:

## 2021-06-15 NOTE — ANESTHESIA PROCEDURE NOTES
Spinal Block    Patient location during procedure: OR  Start time: 1/11/2018 2:07 PM  End time: 1/11/2018 2:11 PM  Reason for block: primary anesthetic    Staffing:  Performing  Anesthesiologist: ARABELLA ENRIQUEZ    Preanesthetic Checklist  Completed: patient identified, risks, benefits, and alternatives discussed, timeout performed, consent obtained, airway assessed, oxygen available, suction available, emergency drugs available and hand hygiene performed  Spinal Block  Patient position: sitting  Prep: ChloraPrep and site prepped and draped  Patient monitoring: heart rate, cardiac monitor, continuous pulse ox and blood pressure  Approach: midline  Location: L3-4  Injection technique: single-shot  Needle type: pencil-tip   Needle gauge: 24 G    Assessment  Sensory level: T6

## 2021-06-15 NOTE — ANESTHESIA CARE TRANSFER NOTE
Last vitals:   Vitals:    01/11/18 1549   BP: 100/59   Pulse: 85   Resp: 15   Temp: 36.4  C (97.5  F)   SpO2: 97%     Patient's level of consciousness is awake  Spontaneous respirations: yes  Maintains airway independently: yes  Dentition unchanged: yes  Oropharynx: oropharynx clear of all foreign objects    QCDR Measures:  ASA# 20 - Surgical Safety Checklist: WHO surgical safety checklist completed prior to induction  PQRS# 430 - Adult PONV Prevention: 4558F - Pt received => 2 anti-emetic agents (different classes) preop & intraop  ASA# 8 - Peds PONV Prevention: NA - Not pediatric patient, not GA or 2 or more risk factors NOT present  PQRS# 424 - Zeenat-op Temp Management: 4559F - At least one body temp DOCUMENTED => 35.5C or 95.9F within required timeframe  PQRS# 426 - PACU Transfer Protocol: - Transfer of care checklist used  ASA# 14 - Acute Post-op Pain: ASA14B - Patient did NOT experience pain >= 7 out of 10

## 2021-06-16 PROBLEM — K21.9 GASTROESOPHAGEAL REFLUX DISEASE WITHOUT ESOPHAGITIS: Status: ACTIVE | Noted: 2018-01-11

## 2021-06-16 PROBLEM — I10 ESSENTIAL HYPERTENSION: Status: ACTIVE | Noted: 2018-01-11

## 2021-06-16 PROBLEM — M17.11 DEGENERATIVE ARTHRITIS OF RIGHT KNEE: Status: ACTIVE | Noted: 2018-01-11

## 2021-06-20 NOTE — PROGRESS NOTES
Patient is continuing participation in the Invested study, a trial of Fluzone Quadrivalent Influenza Vaccine vs Fluzone High-Dose Trivalent Influenza Vaccine in reducing rate of death or hospitalization from heart or lung-related causes in individuals with heart disease at high risk for more heart related events.     Consent process:  Research associate met with subject to discuss participation in the above noted study and the updated consent form, version: 19 June 2018.     The study consent discussion continued with a reveiw of the study purpose and the qualifications for participation. A review of the study procedures, risks and side effects, benefits (none), voluntary nature of participation, confidentiality of records, and financial considerations were also included in the discussion.     Subject was provided with a copy of the consent form to review and time to consider participation.     All of the patient's questions were addressed.  Patient agreed to continue participation in the Invested study. Patient signed consent form version: 19 June 2018 and was given a copy of the consent form.  Patient was entered into study portal and randomly assigned a flu vaccine.     Patient was administered the flu vaccine and waited 20 minutes per protocol. No adverse reaction was noted at the vaccination site 20 minutes post-vaccination.    Plan: Call patient in 1 week per study protocol.

## 2021-06-20 NOTE — PROGRESS NOTES
INVESTED STUDY  Patient is continuing participation in the Invested study, a trial of Fluzone Quadrivalent Influenza Vaccine vs Fluzone High-Dose Trivalent Influenza Vaccine in reducing rate of death or hospitalization from heart or lung-related causes in individuals with heart disease at high risk for more heart related events.     Health Assessments    For subjects with history of MI:  CCS Functional Classification of Angina  [] Class I [] Class II [] Class III [] Class IV    For subjects with Heart Failure:  NYHA function class  [] Class I [x] Class II [] Class III [] Class IV    [place] citizen Study of Health and Aging Clinical Fraility Score  [] Very Fit [] Well [x] Managing Well [] Vulnerable  [] Mildly Frail [] Moderately Frail [] severely Frail  [] Very severly frail [] Terminally ill        Delicia Mueller, RN

## 2021-06-25 NOTE — TELEPHONE ENCOUNTER
Received call back from patient. Discussion with patient, and encouragement that he should establish a general cardiologist.Have plenty of cardiologists within the clinic, or can check with his insurance for providers. Discussion that some cares are out of scope for CNP. Patient verbalizes understanding and disagreement with discussion. Pt reports he will check with his insurance and make a decision. Encouraged to call back if questions. MARIA RRN

## 2021-06-25 NOTE — TELEPHONE ENCOUNTER
----- Message from Wellington Kovacs sent at 3/19/2019  2:45 PM CDT -----  Contact: Pt  General phone call:    Caller: Pt    Primary cardiologist: Dr ANTOINE Courtney / [facility]  Detailed reason for call: Pt was in today to see Dr Courtney - Pt was told since Dr CRESPO is retiring soon he would eventually need to have his care/meds through [facility].      Pt is not in need of anything right now but he wanted to know if [facility] would be OK to prescribe him his medications (Eliquis, Metoprolol, etc) or if he should est care with another Gen [facility]?    Pt would like a call back to just let him know  New or active symptoms? na  Best phone number: home  Best time to contact: any  Ok to leave a detailed message? Yes please  Device? no    Additional Info:

## 2021-06-25 NOTE — PROGRESS NOTES
[facility] Progress Note    Assessment:    1.  Chronic atrial fibrillation with good rate control  2.  Mild nonischemic cardiomyopathy compensated  3.  Obstructive sleep apnea    Plan:    Continue the patient's current medical regiment.  I have not arranged any specific follow-up for Samantha at this time but would be happy to see him in the future if further problems would develop    An After Visit Summary was printed and given to the patient.    Subjective:    78-year-old male with a history of atrial fibrillation since 2016.  Patient underwent electrical cardioversion but developed recurrent atrial fibrillation shortly after his cardioversion.  He has had a mild nonischemic cardiomyopathy.  His last echocardiogram was August 22, 2018 where his ejection fraction was 50% with mild concentric left ventricular hypertrophy and left atrial enlargement noted.  Patient continues to be asymptomatic with no new cardiovascular symptoms reported    Problem List:    Patient Active Problem List   Diagnosis     Persistent atrial fibrillation (H)     Congestive heart failure (H)     JONATHAN on CPAP     Nonischemic cardiomyopathy (H)     Degenerative arthritis of right knee     Essential hypertension     Faust's esophagus     Gastroesophageal reflux disease without esophagitis         Current Outpatient Medications   Medication Sig Dispense Refill     acetaminophen (TYLENOL) 500 MG tablet Take 2 tablets (1,000 mg total) by mouth 3 (three) times a day. (Patient taking differently: Take 1,000 mg by mouth as needed. ) 90 tablet 0     aluminum-magnesium hydroxide-simethicone (MAALOX ADVANCED) 200-200-20 mg/5 mL Susp Take 30 mL by mouth every 4 (four) hours as needed.  0     cholecalciferol, vitamin D3, 1,000 unit tablet Take 2,000 Units by mouth daily.        cyanocobalamin 1000 MCG tablet Take 1,000 mcg by mouth daily.       ELIQUIS 5 mg Tab tablet TAKE ONE TABLET BY MOUTH TWICE A DAY 60 tablet 1     GLUCOSAMINE  HCL/CHONDR THOMAS A NA (OSTEO BI-FLEX ORAL) Take 1 capsule by mouth daily.       latanoprost (XALATAN) 0.005 % ophthalmic solution Administer 1 drop to both eyes at bedtime.       metoprolol succinate (TOPROL-XL) 25 MG TAKE ONE TABLET BY MOUTH EVERY DAY 90 tablet 1     multivitamin therapeutic (THERAGRAN) tablet Take 1 tablet by mouth daily.       omeprazole (PRILOSEC) 10 MG capsule Take 10 mg by mouth daily as needed. Takes a second dose of omeprazole later in the day if needed       ketorolac (ACULAR LS) 0.4 % Drop Administer 1 drop to both eyes 2 (two) times a day.       prednisoLONE acetate (PRED-FORTE) 1 % ophthalmic suspension Administer 1 drop to both eyes 2 (two) times a day.       traMADol (ULTRAM) 50 mg tablet Take 1 tablet (50 mg total) by mouth every 6 (six) hours. 60 tablet 0     Current Facility-Administered Medications   Medication Dose Route Frequency Provider Last Rate Last Dose     Study Drug influenza vaccine injection 0.5 mL  0.5 mL Intramuscular Once Lilian Gerardo MD           .  Past Surgical History:   Procedure Laterality Date     APPENDECTOMY       BREAST BIOPSY       HERNIA REPAIR       CO TOTAL KNEE ARTHROPLASTY Right 1/11/2018    Procedure: RIGHT MINIMALLY INVASIVE TOTAL KNEE ARTHROPLASTY;  Surgeon: Kurt Piper MD;  Location: [facility];  Service: Orthopedics     septoturbinoplasty  01/09/2012     TOTAL KNEE ARTHROPLASTY Left 2012     VEIN LIGATION AND STRIPPING Left        .  Social History     Socioeconomic History     Marital status:      Spouse name: Not on file     Number of children: Not on file     Years of education: Not on file     Highest education level: Not on file   Occupational History     Not on file   Social Needs     Financial resource strain: Not on file     Food insecurity:     Worry: Not on file     Inability: Not on file     Transportation needs:     Medical: Not on file     Non-medical: Not on file   Tobacco Use     Smoking status: Former Smoker  "    Packs/day: 3.00     Types: Cigarettes     Last attempt to quit: 9/10/1988     Years since quittin.5     Smokeless tobacco: Never Used   Substance and Sexual Activity     Alcohol use: Yes     Alcohol/week: 16.8 oz     Types: 28 Cans of beer per week     Comment: 4 beers a day     Drug use: No     Sexual activity: Not on file   Lifestyle     Physical activity:     Days per week: Not on file     Minutes per session: Not on file     Stress: Not on file   Relationships     Social connections:     Talks on phone: Not on file     Gets together: Not on file     Attends Sabianist service: Not on file     Active member of club or organization: Not on file     Attends meetings of clubs or organizations: Not on file     Relationship status: Not on file     Intimate partner violence:     Fear of current or ex partner: Not on file     Emotionally abused: Not on file     Physically abused: Not on file     Forced sexual activity: Not on file   Other Topics Concern     Not on file   Social History Narrative     Not on file       .  Family History   Problem Relation Age of Onset     Pacemaker Father      Review of Systems:  General: WNL  Eyes: WNL  Ears/Nose/Throat: WNL  Lungs: WNL  Heart: WNL  Stomach: WNL  Bladder: WNL  Muscle/Joints: WNL  Skin: WNL  Nervous System: WNL  Mental Health: WNL     Blood: WNL    Objective:     /68 (Patient Site: Right Arm, Patient Position: Sitting, Cuff Size: Adult Large)   Pulse 80   Resp 16   Ht 5' 11\" (1.803 m)   Wt (!) 249 lb (112.9 kg)   BMI 34.73 kg/m    (!) 249 lb (112.9 kg)   [unfilled]    Physical Exam:    GENERAL APPEARANCE: alert, no apparent distress  HEENT: no scleral icterus or xanthelasma  NECK: jugular venous pressure normal  CHEST: symmetric, the lungs were clear to auscultation  CARDIOVASCULAR: irregular rhythm without murmur, click, or gallop; no carotid bruits  ABDOMEN: nondistended, nontender, bowel sounds present  EXTREMITIES: no cyanosis, clubbing with 1+ " bilateral pedal edema with superficial lower leg varicosities.    Cardiac Testing:    echocardiogram August 22, 2018 results reviewed as above      Lab Results:    LIPIDS:  Lab Results   Component Value Date    CHOL 179 02/16/2016     Lab Results   Component Value Date    HDL 66 02/16/2016     Lab Results   Component Value Date    LDLCALC 96 02/16/2016     Lab Results   Component Value Date    TRIG 85 02/16/2016     No components found for: CHOLHDL    BMP:  Lab Results   Component Value Date    CREATININE 0.87 08/07/2018    BUN 18 08/07/2018     08/07/2018    K 4.2 08/07/2018     08/07/2018    CO2 23 08/07/2018         Feliz Mitchell MD,F.A.C.C.  [facility]  [phone]

## 2021-06-26 NOTE — PROGRESS NOTES
Progress Notes by Ashley Leon CNP at 8/7/2018  8:30 AM     Author: Ashley Leon CNP Service: -- Author Type: Nurse Practitioner    Filed: 8/7/2018  9:18 AM Encounter Date: 8/7/2018 Status: Signed    : Ashley Leon CNP (Nurse Practitioner)           Click to link to [facility] HEART CARE NOTE      Assessment/Recommendations   Assessment:    1. Nonischemic cardiomyopathy, ejection fraction 45%: He has noticed increased shortness of breath, edema, and weight gain over the past 5-6 months.  He attributes this to poor diet.  He has also been drinking 4 beers daily.  It is unclear if this is acute fluid retention or increased caloric intake.    2.  Permanent atrial fibrillation: He had a cardioversion in March 2016.  He went back into atrial fibrillation and is now on rate control strategy.  He continues to take metoprolol and Eliquis.    3.  Obstructive sleep apnea: He wears a CPAP nightly.    Plan:  1.  BMP and BNP pending.  If BNP is elevated, will start Lasix.  2.  Low-sodium diet and daily weights  3.  Limit alcohol intake to no more than 2 alcoholic drinks daily.  He admits that he most likely will not decrease his alcohol intake.  4.  He is overdue for echocardiogram ordered by Dr. Mitchell.  He is also overdue for follow-up with Dr. Mitchell.       History of Present Illness    Mr. Samantha Ramos is a 77 y.o. male seen at [facility] heart failure clinic today for continued follow-up.  He has a history of nonischemic cardiomyopathy, permanent atrial fibrillation, hypertension, and obstructive sleep apnea.  Echocardiogram from August 2016 showed an ejection fraction of 45%.    Over the past 5-6 months, he has noticed increased shortness of breath, edema, and weight gain.  He has been eating higher sodium meals and having about 4 beers daily.  He denies fatigue, lightheadedness, orthopnea, PND and chest pain.  He has a wound on his right calf that  has serous drainage.    His home weight is about 239 pounds.  He states that his normal weight a few months ago was 227 pounds.  He has not been exercising but plans to return to the gym 3 days a week.     Physical Examination Review of Systems   Vitals:    08/07/18 0831   BP: 136/90   Pulse: 80   Resp: 16     Body mass index is 33.75 kg/(m^2).  Wt Readings from Last 3 Encounters:   08/07/18 (!) 242 lb (109.8 kg)   01/12/18 (!) 235 lb (106.6 kg)   09/21/17 (!) 231 lb (104.8 kg)       General Appearance:     Alert, cooperative and in no acute distress.   ENT/Mouth: membranes moist, no oral lesions or bleeding gums.      EYES:  no scleral icterus, normal conjunctivae   Chest/Lungs:   lungs are clear to auscultation, no rales or wheezing, respirations unlabored   Cardiovascular:    Irregularly irregular. Normal first and second heart sounds; 1+ edema bilateral lower extremities (right slightly worse than left)   Abdomen:  Soft, nontender, nondistended, bowel sounds present   Extremities: no cyanosis or clubbing   Skin: warm, dry.    Neurologic: mood and affect are appropriate, alert and oriented x3      General: Weight Gain  Eyes: WNL  Ears/Nose/Throat: WNL  Lungs: Shortness of Breath  Heart: Shortness of Breath with activity, Leg Swelling  Stomach: WNL  Bladder: WNL  Muscle/Joints: WNL  Skin: Poor Wound Healing  Nervous System: Dizziness  Mental Health: WNL     Blood: WNL     Medical History  Surgical History Family History Social History   Past Medical History:   Diagnosis Date   ? Arrhythmia     Atrial Fibrillation   ? Atrial fibrillation (H)    ? Faust's esophagus    ? Cardiomyopathy, nonischemic (H)    ? Essential hypertension 1/11/2018   ? GERD (gastroesophageal reflux disease)    ? Hyperlipidemia    ? Sleep apnea     CPAP    Past Surgical History:   Procedure Laterality Date   ? APPENDECTOMY     ? BREAST SURGERY      breast biopsy   ? HERNIA REPAIR     ? JOINT REPLACEMENT Left 2012    knee   ? VT TOTAL KNEE  ARTHROPLASTY Right 1/11/2018    Procedure: RIGHT MINIMALLY INVASIVE TOTAL KNEE ARTHROPLASTY;  Surgeon: Kurt Piper MD;  Location: [facility] Main OR;  Service: Orthopedics   ? septoturbinoplasty  01/09/2012   ? VEIN LIGATION AND STRIPPING Left     Family History   Problem Relation Age of Onset   ? Pacemaker Father     Social History     Social History   ? Marital status:      Spouse name: N/A   ? Number of children: N/A   ? Years of education: N/A     Occupational History   ? Not on file.     Social History Main Topics   ? Smoking status: Former Smoker     Packs/day: 3.00     Quit date: 9/10/1988   ? Smokeless tobacco: Never Used   ? Alcohol use 2.4 oz/week     4 Cans of beer per week      Comment: 4 beers a day   ? Drug use: No   ? Sexual activity: Not on file     Other Topics Concern   ? Not on file     Social History Narrative          Medications  Allergies   Current Outpatient Prescriptions   Medication Sig Dispense Refill   ? acetaminophen (TYLENOL) 500 MG tablet Take 2 tablets (1,000 mg total) by mouth 3 (three) times a day. (Patient taking differently: Take 1,000 mg by mouth as needed. ) 90 tablet 0   ? aluminum-magnesium hydroxide-simethicone (MAALOX ADVANCED) 200-200-20 mg/5 mL Susp Take 30 mL by mouth every 4 (four) hours as needed.  0   ? apixaban (ELIQUIS) 5 mg Tab tablet Take 5 mg by mouth 2 (two) times a day.     ? cholecalciferol, vitamin D3, 1,000 unit tablet Take 2,000 Units by mouth daily.      ? cyanocobalamin 1000 MCG tablet Take 1,000 mcg by mouth daily.     ? GLUCOSAMINE HCL/CHONDR THOMAS A NA (OSTEO BI-FLEX ORAL) Take 1 capsule by mouth daily.     ? ketorolac (ACULAR LS) 0.4 % Drop Administer 1 drop to both eyes 2 (two) times a day.     ? latanoprost (XALATAN) 0.005 % ophthalmic solution Administer 1 drop to both eyes at bedtime.     ? metoprolol succinate (TOPROL-XL) 25 MG TAKE ONE TABLET BY MOUTH EVERY DAY 90 tablet 1   ? multivitamin therapeutic (THERAGRAN) tablet Take 1 tablet by  mouth daily.     ? omeprazole (PRILOSEC) 10 MG capsule Take 10 mg by mouth daily as needed. Takes a second dose of omeprazole later in the day if needed     ? prednisoLONE acetate (PRED-FORTE) 1 % ophthalmic suspension Administer 1 drop to both eyes 2 (two) times a day.     ? traMADol (ULTRAM) 50 mg tablet Take 1 tablet (50 mg total) by mouth every 6 (six) hours. 60 tablet 0     No current facility-administered medications for this visit.       Allergies   Allergen Reactions   ? Indocin [Indomethacin] Itching         Lab Results    Chemistry CBC BNP   Lab Results   Component Value Date    CREATININE 0.77 01/13/2018    BUN 19 01/13/2018     01/13/2018    K 4.3 01/13/2018     01/13/2018    CO2 27 01/13/2018     Creatinine (mg/dL)   Date Value   01/13/2018 0.77   01/12/2018 0.85   01/11/2018 0.80   03/03/2016 1.23    Lab Results   Component Value Date    WBC 8.2 02/08/2017    HGB 12.8 (L) 01/13/2018    HCT 28.8 (L) 02/08/2017    MCV 82 02/08/2017     01/11/2018    Lab Results   Component Value Date     (H) 01/13/2018     BNP (pg/mL)   Date Value   01/13/2018 140 (H)   03/03/2016 94 (H)   02/16/2016 215 (H)          25 minutes were spent with the patient with greater than 50% spent on education and counseling.      Ashley Leon, [facility]   Heart Failure Clinic

## 2021-06-28 NOTE — PROGRESS NOTES
Progress Notes by Liborio Mcclain at 10/18/2019  8:00 AM     Author: Liborio Mcclain Service: -- Author Type: Patient Access    Filed: 10/18/2019 10:48 AM Encounter Date: 10/18/2019 Status: Signed    : Liborio Mcclain (Patient Access)             Zestar Study Consent Visit    Study description: ECG and PPG Study: Zestar Study      Note time seated: 0805     Samantha Ramos a 78 y.o. male , was seen in [facility] today to discuss participation in the Zestar study.   The consent discussion began on 10/16/19.  Please refer to phone call note from Sri Huntley for more details.  The consent form was reviewed with the patient.     The review of the study included:    Study purpose     Conflict of interest    Device description      Study visits    Risks of participation    Benefits (if any)    Alternatives    Voluntary participation    Confidentiality     Compensation/costs of participation    Study stipends    Injury and legal rights    The subject was provided time to review the consent form and consider participation. his questions were answered to his satisfaction.   The patient has voluntarily agreed to participate in the above noted study.     The consent form version 25 Sep 2019 and HIPAA form version 11 Jun 2019 was signed 10/18/19 at 0825    The subject was provided with a copy of the consent form and HIPPA. A copy of the signed forms was forwarded to medical records.    No study procedures were done prior to Samantha Ramos providing informed consent.     Liborio Mcclain    Subject Restrictions During Study -Confirmed with Subject prior to any study procedures completed    Restrictions on jewelry, recreational drugs, caffeine, and exercise few days prior and during study.   1. Subjects should not consume excessive amount of caffeine (6 or more 8-oz cups of coffee, or more than 570 mg of caffeine from energy drinks, pills or similar substance) during their participation in the study.   2. Subjects should not consume excessive amount  "of alcohol for the duration of their participation in the study. A typical moderate amount is allowed during stage 3.   3. Subjects should not take any recreational drugs (including, but not limited to methamphetamines, cocaine, opioids, cannabis, LSD) for the duration of their participation in the study.   4. Subjects should not wear underwire bra or jewelry during the in-lab study (to not interfere with electrode placement and ECG data recordings).   5. Subject will not be permitted to have their cell phone or any electronic recording device on or with them during the in-lab test session(s).   6. Subjects under 22 years old will not be permitted to take ECG recordings through the ECG alberto on the wrist-worn devices.     For study stage 3 only   1. Subjects should only do high intensity exercise (e.g. sprinting, heavy lifting, etc.) in the morning upon awakening or else not at all   2. Subjects should abstain from swimming during the time of the study   3. Subjects should only shower in the morning upon awakening (or else not at all)   4. Female subjects are strongly suggested to wear non-underwire bras throughout this stage of the study     Liborio Mcclain      Study Data collections   Vitals  (TPBP)     Vitals:    10/18/19 0829 10/18/19 0830 10/18/19 0831   BP: 121/77 119/76 123/72   Patient Site: Right Arm Right Arm Right Arm   Patient Position: Sitting Sitting Sitting   Cuff Size: Adult Large Adult Large Adult Large   Pulse: 66 69 70   Resp:  15    Temp: 97.7  F (36.5  C)     Weight:   (!) 238 lb 1.6 oz (108 kg)   Height:   5' 10.47\" (1.79 m)      VS taken after 5 min rest     MAP 1    93  MAP 2      93   MAP 3             90          Body mass index is 33.71 kg/m .  male  1940  78 y.o.      Note time patient placed in supine position: 0834    Ethnicity   []   or    [x]  Not  or   Race   []   or    []    []  Black or   []  Native "  or Other   [x]  White  Physical Activity Level  per subject report:   []  0- Extremely Inactive []  1- Sedentary [x]  2- Moderately Active  []  3- Vigorously Active []  4- Extremely Active  Trained Athlete   [] Yes  [x] No     Quiroz's' Skin type    [] Type 1 [] Type 2 [x] Type 3    [] Type 4 [] Type 5 [] Type 6    Subject participated in previous ECG study at [facility]: [] Yes    [x] No    Past Medical History:   Diagnosis Date   ? Atrial fibrillation (H) 04/01/2016   ? Faust's esophagus 2015   ? Cardiomyopathy, nonischemic (H) 2016   ? CPAP (continuous positive airway pressure) dependence 2015   ? GERD (gastroesophageal reflux disease) 2015   ? Heart failure (H) 06/30/2016   ? Hyperlipidemia 2016   ? Sleep apnea 12/2015    CPAP   ? Vitamin D deficiency 2018       HISTORY OF HEART RHYTHM ABNORMALITIES   []  None   []  Atrial Flutter  [] Frequent PACs (>3 in 30 secs)  [] AF (permanent)  []  Tachycardia  [] Bigeminy, trigeminy, and/or quadgeminy  []  AF (persistent)  []  Heart Block   [x]  AF (paroxysmal)  [] PVCs    [] AF (other)    [] BBB    []  Other:   How many years? 3  Special interest allergies: active allergic skin reactions  Allergies   Allergen Reactions   ? Indocin [Indomethacin] Itching       Current Outpatient Medications:   ?  acetaminophen (TYLENOL) 500 MG tablet, Take 2 tablets (1,000 mg total) by mouth 3 (three) times a day. (Patient taking differently: Take 1,000 mg by mouth as needed. ), Disp: 90 tablet, Rfl: 0  ?  aluminum-magnesium hydroxide-simethicone (MAALOX ADVANCED) 200-200-20 mg/5 mL Susp, Take 30 mL by mouth every 4 (four) hours as needed., Disp: , Rfl: 0  ?  apixaban (ELIQUIS) 5 mg Tab tablet, Take 1 tablet (5 mg total) by mouth 2 (two) times a day., Disp: 180 tablet, Rfl: 0  ?  cholecalciferol, vitamin D3, 1,000 unit tablet, Take 2,000 Units by mouth daily. , Disp: , Rfl:   ?  cyanocobalamin 1000 MCG tablet, Take 1,000 mcg by mouth daily., Disp: , Rfl:  "  ?  furosemide (LASIX) 20 MG tablet, Take 20 mg by mouth 2 (two) times a day., Disp: , Rfl:   ?  GLUCOSAMINE HCL/CHONDR THOMAS A NA (OSTEO BI-FLEX ORAL), Take 1 capsule by mouth daily., Disp: , Rfl:   ?  ketorolac (ACULAR LS) 0.4 % Drop, Administer 1 drop to both eyes 2 (two) times a day., Disp: , Rfl:   ?  latanoprost (XALATAN) 0.005 % ophthalmic solution, Administer 1 drop to both eyes at bedtime., Disp: , Rfl:   ?  metoprolol succinate (TOPROL-XL) 25 MG, TAKE ONE TABLET BY MOUTH EVERY DAY, Disp: 90 tablet, Rfl: 1  ?  multivitamin therapeutic (THERAGRAN) tablet, Take 1 tablet by mouth daily., Disp: , Rfl:   ?  omeprazole (PRILOSEC) 10 MG capsule, Take 10 mg by mouth daily as needed. Takes a second dose of omeprazole later in the day if needed, Disp: , Rfl:   ?  prednisoLONE acetate (PRED-FORTE) 1 % ophthalmic suspension, Administer 1 drop to both eyes 2 (two) times a day., Disp: , Rfl:   ?  traMADol (ULTRAM) 50 mg tablet, Take 1 tablet (50 mg total) by mouth every 6 (six) hours., Disp: 60 tablet, Rfl: 0    Current Facility-Administered Medications:   ?  Study Drug influenza vaccine injection 0.5 mL, 0.5 mL, Intramuscular, Once, Lilian Gerardo MD      10-sec 12-lead ECG & 30-sec 12-lead ECG rhythm strip done; reviewed by & PE done by Tita Robles  Subject Questionnaire    OCCUPATION: Retired   Predominately works outdoors  [] Yes    [x] No      Hours/week spent outdoors (total, not only for work): 60    Frequently participates in \"hand intensive\" activities [] Yes [x] No  Caffeine  []  Never  [] Occasionally        [x]  Daily (1 cup/day)     []  Daily (>1 cup/day)    Alcohol   []  Never  [] Light (drink or 2 occasionally) []  Moderate (a drink or 2 almost daily)   []  Occasional-heavy (more than a few drinks <2x / month)  [x] Heavy (more than a few drinks >2x / month)    Tobacco/nicotine  [x]  Never  [] Rarely  []  Frequently/ Daily     Mattress Information  [] Subject did not participate in Stage 3  Mattress " "type:  []  Memory foam [] Gel  [] Innerspring (coil)  [] Airbed  [] Waterbed [] Shikibuton  [] Hybrid  [] No mattress  [x] Other (comment): Posturepedic   Mattress foundation   [] Mattress on floor/ground    [] Mattress on foundation/box spring on floor/ground  [x] Mattress on foundation/box spring on bed frame  [] Mattress on tatami on floor/ground  [] Other (comment):    Mattress topper   [] No mattress topper  [] Pillow top  [x] Foam top - flat style  [] Foam top - \"egg crate\" style  [x] Other (comment):    Co-sleeper    [x]  Yes  []  No    CPAP use   [x] Yes  [] No      Dominant hand [] left  [x] right [] ambidextrous  Preferred Wrist to wear band on   [x]  left   []  right   Were screening day & study day: [x]  same [] different   Same: wrist circumference:      179   mm   Device wearing wrist skin fold thickness:       4.0  mm  Wrist Band Size:     []  Flush Fit S/M  []  Non-Flush Fit S/M   [x]  Flush Fit M/L  []  Non-Flush Fit M/L  []  Flush Fit XL  []  Non-Flush Fit XL    Preferred/natural band notch: 3  Secure band notch: 3  Crown orientation:  []  left   [x]  right  Device wearing wrist hairiness:     []  Light []  Medium       [x]  Heavy  Spectophotometer    L  A  B   Reading #1  49.37  11.72  15.96   Reading #2  48.12  10.72  14.91   Reading #3  48.89  11.19  14.18     Pregnancy test  for WOCBP    [x] n/a male or female not child bearing potential  Room Temperature ( C): 23  CS Laptop ID: [id number]  CS Cam ID:[id number]  Device Set ID:[id number]  Wrist Device ID:[id number]  Subject has now completed their in-house participation in the Zestar study. Subject will complete Stage 3 at home for the next 3 days and return the equipment on 10/21/19.  Liborio Mcclain"

## 2021-06-28 NOTE — PROGRESS NOTES
Progress Notes by Liborio Mcclain at 10/18/2019  8:00 AM     Author: Liborio Mcclain Service: -- Author Type: Patient Access    Filed: 10/26/2019  7:23 PM Encounter Date: 10/18/2019 Status: Signed    : Galo Nelson MD (Physician)    Related Notes: Original Note by Liborio Mcclain (Patient Access) filed at 10/18/2019 10:48 AM           [facility] Study Inclusion / Exclusion Criteria  Protocol Version 2.0 ([id number])    Inclusion Criteria    Yes No Criteria # Subject must meet all inclusion criteria:      [x]    []  1 At least 18 years old for NSR and 22 years old for Non-NSR. Inclusive for both cohorts, at time of screening, with no upper limit on age.        [x]      []  2 To be enrolled as a non-NSR subject the volunteer must have one of the following conditions: Permanent/Persistent AF, Hx of paroxysmal AF, Hx of High-rate AF, AF + rate control medication, Hx Atrial Flutter, PVC burden >1%, Frequent PACs, Hx BBB, Hx 2nd degree block (any type), Hx Bigeminy/Trigeminy/Quadgeminy, Hx Tachycardia. For subjects with any of the following diagnoses, the condition must be present at the time of screening:   ? Permanent/persistent AF  ? PVC [place]  ? Frequent PACs   Note: If not present at screening, subjects may not be enrolled as a non-NSR subject or NSR subject.         3  [x] N/A HE site For Subjects to be enrolled as NSR they must be in NSR at the time of screening as determined by the investigator. For subjects with occasional ectopic beats (<1% burden during screening), they should still qualify as individuals in the NSR cohort as long as they don't have a medical history/diagnosis of significant ectopic burden.    [x]  []  4 Able to read and understand a written ICF    [x]  []  5 Willing and able to participate in the study procedures and comply with its restrictions    [x]  []  6 Able to communicate effectively with study staff as well as understand and follow directions    All must be Yes    Exclusion Criteria-all must  be no  Yes No Criteria # Subject must not meet any exclusion criteria:    []  [x]  1 Physical disability that prevents safe and adequate testing.   []  [x]  2 Pregnant women or women planning to become pregnant   []  [x]  3 Any acute illness or condition that may interfere with study procedures (e.g. cough, fever, sore throat, headache, sunburn, etc.)   []  [x]  4 Clinically significant hand tremors, as judged by the Investigator   []  [x]  5 Resting hypertension with systolic blood pressure ?161 mmHg or diastolic blood pressure ?101 mmHg (if at least 2 of 3 measurements meet this criteria)   []  [x]  6 Subjects with implanted cardiac devices such as a pacemaker or an automated implantable cardioverter- defibrillator (AICD)   []  [x]  7 Acute myocardial infarction (MI) within 90 days from the screening visit   []  [x]  8 Other cardiovascular disease that increases the risk to the subject or would render the data uninterpretable in the opinion of the Investigator (e.g., recent or ongoing unstable angina, significant valvular heart disease or chronic heart failure, myocarditis or pericarditis)    []  [x]  9 Acute pulmonary embolism, pulmonary infarction, or deep vein thrombosis within 90 days from the screening visit    []  [x]  10 Stroke or transient ischemic attack within 90 days from the screening visit    []  [x]  11 Known untreated medical conditions as determined by the Investigator, such as but not limited to significant anemia, important electrolyte imbalance and untreated or uncontrolled thyroid disease.    []  [x]  12 Any history of wrist surgery with scarring in the area of the sensor location on the wrist where the subject will be wearing the watch;    []  [x]  13 Open wound(s) on the wrist and forearm where the subject will be wearing the watch    []  [x]  14 Severe symptomatic (or active) overly dry/injured skin, skin disorders, or allergic skin reactions such as eczema, rosacea, impetigo,  dermatomyositis or allergic contact dermatitis on wrist and locations where the electrodes will be placed (e.g. chest, forearms, stomach), as determined by the investigator.    []  [x]  15 Tattoos, scars or moles in the area of the sensor location on the wrist where the subject will be wearing the watch    []  [x]  16 Device wearing Wrist circumference ? 129 mm or ? 246 mm    []  [x]  17 Known significant sensitivity to medical adhesives or isopropyl alcohol (for ECG electrode placement)    []  [x]  18 Known allergy or sensitivity to fluorocarbon-based synthetic rubber, such as contact dermatitis with fluoroelastomer bands primarily used in wrist worn fitness devices    []  [x]  19 Subjects with any Medical History, Physical exam, vital sign or any other study procedure finding/assessment that in the opinion of the investigator could compromise subject safety during study participation or interfere with the study integrity and/or the accurate assessment of the study objectives    []  [x]  20 Subject works for a company that develops or sells medical and/or fitness devices (e.g., ECG monitors, wearable fitness bands, sleep monitors, etc.) or are technology journalists (e.g., professional bloggers, TV, magazine, newspaper reporters, etc.)    []  [x]  21 Weight > 181 kg for subjects using the stationary bike and/or treadmill. Weight of ?138 kg for NSR subjects.    []  [x]  22 Subject is employed in shift work, or otherwise does not maintain a reasonably consistent day/night schedule (e.g. Subjects who go to bed after 4am).    []  [x]  23 Overnight travel planned during data collection nights    []  [x]  24 Non-NSR subjects should not have partaken in strenuous physical activity within 12 hours prior to screening    []  [x]  25 Non-NSR subjects with Atrial fibrillation categories: Subjects taking Class 1 or Class 3 antiarrhythmic agents such as the following may not take part in any stage of the study: amiodarone,  sotalol, dronedarone, ibutilide, dofetilide, propafenone, quinidine, procainamide, disopyramide, flecainide (Subjects taking class 2, 4 or 5 antiarrhythmic agents may take part the study).    []  [x]  26 Subjects who have both a history of paroxysmal AF and a Quiroz skin type measurement of VI    []  [x]  27 Subjects who have missing index fingers on both hands      Patient meets inclusion criteria.  Patient has no exclusion criteria.    Liborio Mcclain

## 2021-06-28 NOTE — PROGRESS NOTES
Progress Notes by Behr-Holewinski, Sierra, RN at 10/21/2019  9:34 AM     Author: Behr-Holewinski, Sierra, RN Service: -- Author Type: Patient Access    Filed: 10/21/2019  9:35 AM Encounter Date: 10/21/2019 Status: Signed    : Behr-Holewinski, Sierra, RN (Registered Nurse)         Zestar Study Device Return    Samantha Ramos returned all the devices for the Zestar study.  Samantha Ramos denies medication changes or adverse events since last visit.    Samantha Ramos has now completed their participation in the Zestar study.   Thank you for your gift of participation.    Sierra Behr-Holewinski

## 2021-06-28 NOTE — PROGRESS NOTES
Progress Notes by Tita Robles CNP at 10/18/2019  8:00 AM     Author: Tita Robles CNP Service: -- Author Type: Nurse Practitioner    Filed: 10/18/2019 10:48 AM Encounter Date: 10/18/2019 Status: Signed    : Tita Robles CNP (Nurse Practitioner)        Zestar Study    Physical Examination  For abnormal findings, please evaluate if the finding is Clinically Significant (by 'CS') or Not Clinically Significant (by 'NCS')  General Appearance Normal  Head and Neck  Normal  Lungs    Normal  Cardiovascular  Abnormal- irregular heart beat NCS  Abdomen   Normal  Musculoskeletal/Extremities +1 bilateral lower extremity edema NCS   Lymph Nodes  Normal  Skin    Normal  Neurological   Normal   Tremor             none    If present, evaluate severity on 1-10 scale    Tita Robles CNP

## 2021-07-03 NOTE — ADDENDUM NOTE
Addendum Note by Delicia Dumont RN at 10/15/2018 12:38 PM     Author: Delicia Dumont RN Service: -- Author Type: Registered Nurse    Filed: 10/15/2018 12:38 PM Encounter Date: 10/10/2018 Status: Signed    : Delicia Dumont RN (Registered Nurse)    Addended by: DELICIA DUMONT on: 10/15/2018 12:38 PM        Modules accepted: Orders, SmartSet

## 2021-11-06 ENCOUNTER — HOSPITAL ENCOUNTER (EMERGENCY)
Facility: CLINIC | Age: 81
Discharge: HOME | End: 2021-11-06
Attending: EMERGENCY MEDICINE | Admitting: EMERGENCY MEDICINE
Payer: COMMERCIAL

## 2021-11-06 ENCOUNTER — APPOINTMENT (OUTPATIENT)
Dept: CT IMAGING | Facility: CLINIC | Age: 81
End: 2021-11-06
Attending: EMERGENCY MEDICINE
Payer: COMMERCIAL

## 2021-11-06 VITALS
BODY MASS INDEX: 33.69 KG/M2 | WEIGHT: 238 LBS | DIASTOLIC BLOOD PRESSURE: 84 MMHG | HEART RATE: 80 BPM | RESPIRATION RATE: 18 BRPM | OXYGEN SATURATION: 98 % | SYSTOLIC BLOOD PRESSURE: 156 MMHG | TEMPERATURE: 98 F

## 2021-11-06 DIAGNOSIS — R42 DIZZINESS: ICD-10-CM

## 2021-11-06 DIAGNOSIS — R26.9 GAIT DIFFICULTY: ICD-10-CM

## 2021-11-06 LAB
ALBUMIN UR-MCNC: 10 MG/DL
ANION GAP SERPL CALCULATED.3IONS-SCNC: 12 MMOL/L (ref 5–18)
APPEARANCE UR: CLEAR
APTT PPP: 37 SECONDS (ref 22–38)
BACTERIA #/AREA URNS HPF: ABNORMAL /HPF
BASOPHILS # BLD AUTO: 0 10E3/UL (ref 0–0.2)
BASOPHILS NFR BLD AUTO: 0 %
BILIRUB UR QL STRIP: NEGATIVE
BUN SERPL-MCNC: 15 MG/DL (ref 8–28)
CALCIUM SERPL-MCNC: 9.2 MG/DL (ref 8.5–10.5)
CHLORIDE BLD-SCNC: 106 MMOL/L (ref 98–107)
CO2 SERPL-SCNC: 21 MMOL/L (ref 22–31)
COLOR UR AUTO: YELLOW
CREAT SERPL-MCNC: 0.98 MG/DL (ref 0.7–1.3)
EOSINOPHIL # BLD AUTO: 0.2 10E3/UL (ref 0–0.7)
EOSINOPHIL NFR BLD AUTO: 3 %
ERYTHROCYTE [DISTWIDTH] IN BLOOD BY AUTOMATED COUNT: 13.2 % (ref 10–15)
GFR SERPL CREATININE-BSD FRML MDRD: 73 ML/MIN/1.73M2
GLUCOSE BLD-MCNC: 126 MG/DL (ref 70–125)
GLUCOSE UR STRIP-MCNC: NEGATIVE MG/DL
HCT VFR BLD AUTO: 42.2 % (ref 40–53)
HGB BLD-MCNC: 14.2 G/DL (ref 13.3–17.7)
HGB UR QL STRIP: ABNORMAL
HOLD SPECIMEN: NORMAL
IMM GRANULOCYTES # BLD: 0 10E3/UL
IMM GRANULOCYTES NFR BLD: 1 %
INR PPP: 1.14 (ref 0.85–1.15)
KETONES UR STRIP-MCNC: NEGATIVE MG/DL
LEUKOCYTE ESTERASE UR QL STRIP: NEGATIVE
LYMPHOCYTES # BLD AUTO: 0.9 10E3/UL (ref 0.8–5.3)
LYMPHOCYTES NFR BLD AUTO: 15 %
MAGNESIUM SERPL-MCNC: 1.7 MG/DL (ref 1.8–2.6)
MCH RBC QN AUTO: 32.5 PG (ref 26.5–33)
MCHC RBC AUTO-ENTMCNC: 33.6 G/DL (ref 31.5–36.5)
MCV RBC AUTO: 97 FL (ref 78–100)
MONOCYTES # BLD AUTO: 0.5 10E3/UL (ref 0–1.3)
MONOCYTES NFR BLD AUTO: 8 %
MUCOUS THREADS #/AREA URNS LPF: PRESENT /LPF
NEUTROPHILS # BLD AUTO: 4.3 10E3/UL (ref 1.6–8.3)
NEUTROPHILS NFR BLD AUTO: 73 %
NITRATE UR QL: NEGATIVE
NRBC # BLD AUTO: 0 10E3/UL
NRBC BLD AUTO-RTO: 0 /100
PH UR STRIP: 6.5 [PH] (ref 5–7)
PLATELET # BLD AUTO: 167 10E3/UL (ref 150–450)
POTASSIUM BLD-SCNC: 4.2 MMOL/L (ref 3.5–5)
RBC # BLD AUTO: 4.37 10E6/UL (ref 4.4–5.9)
RBC URINE: 4 /HPF
SODIUM SERPL-SCNC: 139 MMOL/L (ref 136–145)
SP GR UR STRIP: 1.02 (ref 1–1.03)
TROPONIN I SERPL-MCNC: 0.01 NG/ML (ref 0–0.29)
TSH SERPL DL<=0.005 MIU/L-ACNC: 1.21 UIU/ML (ref 0.3–5)
UROBILINOGEN UR STRIP-MCNC: <2 MG/DL
WBC # BLD AUTO: 5.9 10E3/UL (ref 4–11)
WBC URINE: <1 /HPF

## 2021-11-06 PROCEDURE — 36415 COLL VENOUS BLD VENIPUNCTURE: CPT | Performed by: EMERGENCY MEDICINE

## 2021-11-06 PROCEDURE — 99285 EMERGENCY DEPT VISIT HI MDM: CPT | Mod: 25

## 2021-11-06 PROCEDURE — 250N000013 HC RX MED GY IP 250 OP 250 PS 637: Performed by: EMERGENCY MEDICINE

## 2021-11-06 PROCEDURE — 85025 COMPLETE CBC W/AUTO DIFF WBC: CPT | Performed by: EMERGENCY MEDICINE

## 2021-11-06 PROCEDURE — 85610 PROTHROMBIN TIME: CPT | Performed by: EMERGENCY MEDICINE

## 2021-11-06 PROCEDURE — 84484 ASSAY OF TROPONIN QUANT: CPT | Performed by: EMERGENCY MEDICINE

## 2021-11-06 PROCEDURE — 83735 ASSAY OF MAGNESIUM: CPT | Performed by: EMERGENCY MEDICINE

## 2021-11-06 PROCEDURE — 80048 BASIC METABOLIC PNL TOTAL CA: CPT | Performed by: EMERGENCY MEDICINE

## 2021-11-06 PROCEDURE — 85730 THROMBOPLASTIN TIME PARTIAL: CPT | Performed by: EMERGENCY MEDICINE

## 2021-11-06 PROCEDURE — 70450 CT HEAD/BRAIN W/O DYE: CPT

## 2021-11-06 PROCEDURE — 81001 URINALYSIS AUTO W/SCOPE: CPT | Performed by: EMERGENCY MEDICINE

## 2021-11-06 PROCEDURE — 84443 ASSAY THYROID STIM HORMONE: CPT | Performed by: EMERGENCY MEDICINE

## 2021-11-06 PROCEDURE — 93005 ELECTROCARDIOGRAM TRACING: CPT | Performed by: EMERGENCY MEDICINE

## 2021-11-06 RX ORDER — GADOBUTROL 604.72 MG/ML
10 INJECTION INTRAVENOUS ONCE
Status: DISCONTINUED | OUTPATIENT
Start: 2021-11-06 | End: 2021-11-06

## 2021-11-06 RX ORDER — MECLIZINE HYDROCHLORIDE 25 MG/1
25 TABLET ORAL 3 TIMES DAILY PRN
Qty: 21 TABLET | Refills: 0 | Status: SHIPPED | OUTPATIENT
Start: 2021-11-06

## 2021-11-06 RX ORDER — MECLIZINE HYDROCHLORIDE 25 MG/1
25 TABLET ORAL ONCE
Status: COMPLETED | OUTPATIENT
Start: 2021-11-06 | End: 2021-11-06

## 2021-11-06 RX ADMIN — MECLIZINE HYDROCHLORIDE 25 MG: 25 TABLET ORAL at 10:43

## 2021-11-06 ASSESSMENT — ENCOUNTER SYMPTOMS
ARTHRALGIAS: 0
DIFFICULTY URINATING: 0
EYE REDNESS: 0
FEVER: 0
COLOR CHANGE: 0
NECK STIFFNESS: 0
CONFUSION: 0
HEADACHES: 0
ABDOMINAL PAIN: 0
DIZZINESS: 1
SHORTNESS OF BREATH: 0

## 2021-11-06 NOTE — ED NOTES
Attempt to irrigate ear with resident using sterile water. Only a few small flecks of wax irrigated out. Pt said he goes to an ENT and has to have tehm cleaned out.

## 2021-11-06 NOTE — ED PROVIDER NOTES
EMERGENCY DEPARTMENT ENCOUNTER     NAME: Samantha Ramos   AGE: 81 year old male   YOB: 1940   MRN: [id number]   EVALUATION DATE & TIME: 11/6/2021  7:58 AM   PCP: Maria E Zayas     Chief Complaint   Patient presents with     Dizziness   :    FINAL IMPRESSION       No diagnosis found.   Dizziness  Gait unsteadiness        ED COURSE & MEDICAL DECISION MAKING      8:10 AM Met with patient and preformed initial interview and exam. Discussed plan of care.     Pertinent Labs & Imaging studies reviewed. (See chart for details)   81 year old male  presents to the Emergency Department for evaluation of a feeling of dizziness and lightheadedness, also noticing gait instability. Initial Vitals Reviewed. Initial exam notable for generally well-appearing male who when lying in the bed other than having irregularly irregular rhythm with a known history of A. fib that is not new, has no new focal findings.  However, with attempted ambulation he does become what he describes as dizzy and wobbly and feels gait unsteadiness.  He does not have any other obvious cerebellar abnormality on my exam.  I did consider things like anemia, electrolyte abnormalities, ACS, arrhythmia.  Lab studies, EKG are generally unremarkable.  He is anticoagulated on Eliquis so I do not think PE is likely.  I did get a plain CT of his head that did not show any intracranial hemorrhage.  It did show potentially some inflammation versus infection in the left middle ear, but even after cerumen impaction removal we still could not visualize the left tympanic membrane.  Ultimately, he has no complaints of ear pain at all, so I am less suspicious that this is a true infection.  It is possible that it is causing peripheral vertigo so I did also attempt to treat with meclizine.  Because of the ongoing gait unsteadiness, I have added an MRI MRA of the brain and neck to evaluate for CVA.  If this is negative and he can ambulate after meclizine  I anticipate he could be discharged, but at time of signout this imaging is still pending.        MEDICATIONS GIVEN IN THE EMERGENCY:   Medications - No data to display   NEW PRESCRIPTIONS STARTED AT TODAY'S ER VISIT   New Prescriptions    No medications on file     ================================================================   HISTORY OF PRESENT ILLNESS       Patient information was obtained from: Patient   Use of Intrepreter: N/A   Samantha Ramos is a 81 year old male with history of A-fib, CAF, hypertension, and cardiomyopathy, who presents dizziness.      Since yesterday morning, patient reports persistent dizziness described as an unsteady sensation. Patient states he took it easy yesterday but decided to be seen in the ED after still  feeling unstable while walking this morning. Dizziness is provoked when standing or moving head. He denies falling or feeling like he is going to pass out. He mentions having similar symptoms about 20 years ago. History of A-fib on [email or web address]. Patient states he otherwise feels well and denies any other complaints at this time.     ================================================================    REVIEW OF SYSTEMS       Review of Systems   Constitutional: Negative for fever.   HENT: Negative for congestion.    Eyes: Negative for redness.   Respiratory: Negative for shortness of breath.    Cardiovascular: Negative for chest pain.   Gastrointestinal: Negative for abdominal pain.   Genitourinary: Negative for difficulty urinating.   Musculoskeletal: Negative for arthralgias and neck stiffness.   Skin: Negative for color change.   Neurological: Positive for dizziness. Negative for syncope and headaches.        Positive unsteadiness with ambulation    Psychiatric/Behavioral: Negative for confusion.   All other systems reviewed and are negative.        PAST HISTORY     PAST MEDICAL HISTORY:   History reviewed. No pertinent past medical history.   PAST SURGICAL HISTORY:   Past Surgical  History:   Procedure Laterality Date     APPENDECTOMY  14 years old     C TOTAL KNEE ARTHROPLASTY Right 2018    Procedure: RIGHT MINIMALLY INVASIVE TOTAL KNEE ARTHROPLASTY;  Surgeon: Kurt Piper MD;  Location: [facility] OR;  Service: Orthopedics     CATARACT EXTRACTION Bilateral ,2018     HERNIA REPAIR Bilateral      OTHER SURGICAL HISTORY  2012    septoturbinoplasty     TOTAL KNEE ARTHROPLASTY Left 2012     VEIN LIGATION AND STRIPPING Left       CURRENT MEDICATIONS:   acetaminophen (TYLENOL) 500 MG tablet  aluminum-magnesium hydroxide-simethicone (MAALOX ADVANCED) 200-200-20 mg/5 mL Susp  apixaban (ELIQUIS) 5 mg Tab tablet  cholecalciferol, vitamin D3, 1,000 unit tablet  cyanocobalamin 1000 MCG tablet  furosemide (LASIX) 20 MG tablet  GLUCOSAMINE HCL/CHONDR THOMAS A NA (OSTEO BI-FLEX ORAL)  ketorolac (ACULAR LS) 0.4 % Drop  latanoprost (XALATAN) 0.005 % ophthalmic solution  metoprolol succinate (TOPROL-XL) 25 MG  multivitamin therapeutic (THERAGRAN) tablet  omeprazole (PRILOSEC) 10 MG capsule  prednisoLONE acetate (PRED-FORTE) 1 % ophthalmic suspension  traMADol (ULTRAM) 50 mg tablet      ALLERGIES:   Allergies   Allergen Reactions     Indocin [Indomethacin] Itching      FAMILY HISTORY:   Family History   Problem Relation Age of Onset     Pacemaker Father       SOCIAL HISTORY:   Social History     Socioeconomic History     Marital status:      Spouse name: Not on file     Number of children: Not on file     Years of education: Not on file     Highest education level: Not on file   Occupational History     Not on file   Tobacco Use     Smoking status: Former Smoker     Packs/day: 3.00     Types: Cigarettes, Cigarettes     Quit date: 9/10/1988     Years since quittin.1     Smokeless tobacco: Never Used   Substance and Sexual Activity     Alcohol use: Yes     Alcohol/week: 28.0 standard drinks     Comment: Alcoholic Drinks/day: 4 beers a day     Drug use: No     Sexual  activity: Not on file   Other Topics Concern     Not on file   Social History Narrative     Not on file     Social Determinants of Health     Financial Resource Strain:      Difficulty of Paying Living Expenses:    Food Insecurity:      Worried About Running Out of Food in the Last Year:      Ran Out of Food in the Last Year:    Transportation Needs:      Lack of Transportation (Medical):      Lack of Transportation (Non-Medical):    Physical Activity:      Days of Exercise per Week:      Minutes of Exercise per Session:    Stress:      Feeling of Stress :    Social Connections:      Frequency of Communication with Friends and Family:      Frequency of Social Gatherings with Friends and Family:      Attends Yazdanism Services:      Active Member of Clubs or Organizations:      Attends Club or Organization Meetings:      Marital Status:    Intimate Partner Violence:      Fear of Current or Ex-Partner:      Emotionally Abused:      Physically Abused:      Sexually Abused:         VITALS  Patient Vitals for the past 24 hrs:   BP Temp Temp src Pulse Resp SpO2 Weight   11/06/21 0815 (!) 138/93 -- -- 94 27 98 % --   11/06/21 0755 (!) 151/89 98  F (36.7  C) Oral 89 20 97 % 108 kg (238 lb)        ================================================================    PHYSICAL EXAM     VITAL SIGNS: BP (!) 138/93   Pulse 94   Temp 98  F (36.7  C) (Oral)   Resp 27   Wt 108 kg (238 lb)   SpO2 98%   BMI 33.69 kg/m     Constitutional:  Awake, no acute distress, obese  HENT:  Atraumatic, oropharynx without exudate or erythema, membranes moist  Lymph:  No adenopathy  Eyes: EOM intact, PERRL, no injection  Neck: Supple  Respiratory:  Clear to auscultation bilaterally, no wheezes or crackles   Cardiovascular: Irregularly irregular rhythm, normal rate, single S1 and S2   GI:  Soft, nontender, nondistended, no rebound or guarding   Musculoskeletal:  Moves all extremities, no lower extremity edema, no deformities    Skin:  Warm,  dry  Neurologic:  Alert and oriented x3, cranial nerves II through XII, strength, sensation, coordination intact.  With attempted ambulation, patient feels unsteady and requires assistance      ================================================================  LAB       All pertinent labs reviewed and interpreted.   Labs Ordered and Resulted from Time of ED Arrival to Time of ED Departure   BASIC METABOLIC PANEL - Abnormal       Result Value    Sodium 139      Potassium 4.2      Chloride 106      Carbon Dioxide (CO2) 21 (*)     Anion Gap 12      Urea Nitrogen 15      Creatinine 0.98      Calcium 9.2      Glucose 126 (*)     GFR Estimate 73     MAGNESIUM - Abnormal    Magnesium 1.7 (*)    ROUTINE UA WITH MICROSCOPIC REFLEX TO CULTURE - Abnormal    Color Urine Yellow      Appearance Urine Clear      Glucose Urine Negative      Bilirubin Urine Negative      Ketones Urine Negative      Specific Gravity Urine 1.023      Blood Urine 0.03 mg/dL (*)     pH Urine 6.5      Protein Albumin Urine 10  (*)     Urobilinogen Urine <2.0      Nitrite Urine Negative      Leukocyte Esterase Urine Negative      Bacteria Urine Few (*)     Mucus Urine Present (*)     RBC Urine 4 (*)     WBC Urine <1     CBC WITH PLATELETS AND DIFFERENTIAL - Abnormal    WBC Count 5.9      RBC Count 4.37 (*)     Hemoglobin 14.2      Hematocrit 42.2      MCV 97      MCH 32.5      MCHC 33.6      RDW 13.2      Platelet Count 167      % Neutrophils 73      % Lymphocytes 15      % Monocytes 8      % Eosinophils 3      % Basophils 0      % Immature Granulocytes 1      NRBCs per 100 WBC 0      Absolute Neutrophils 4.3      Absolute Lymphocytes 0.9      Absolute Monocytes 0.5      Absolute Eosinophils 0.2      Absolute Basophils 0.0      Absolute Immature Granulocytes 0.0      Absolute NRBCs 0.0     INR - Normal    INR 1.14     PARTIAL THROMBOPLASTIN TIME - Normal    aPTT 37     TROPONIN I - Normal    Troponin I 0.01     TSH WITH FREE T4 REFLEX - Normal    TSH 1.21           ===============================================================  RADIOLOGY       Reviewed all pertinent imaging. Please see official radiology report.   Head CT w/o contrast   Final Result   IMPRESSION:   1.  No evidence of acute intracranial hemorrhage, mass, or herniation.   2.  Large right mastoid effusion with mild opacification of the right middle ear cavity. This could be inflammatory or possibly infectious in etiology.         MR Brain COW Carotid wwo Contrast    (Results Pending)         ================================================================  EKG       EKG reviewed and interpreted by me shows atrial fibrillation with a rate of 74, normal axis,  with no acute ST or T wave changes since EKG in clinic this morning  I have independently reviewed and interpreted the EKG(s) documented above.     ================================================================  PROCEDURES         I, Ovidio Argueta, am serving as a scribe to document services personally performed by Dr. Vargas based on my observation and the provider's statements to me. I, Harleen Vargas MD attest that Ovidio Argueta is acting in a scribe capacity, has observed my performance of the services and has documented them in accordance with my direction.   Harleen Vargas M.D.   Emergency Medicine   [facility] EMERGENCY ROOM  [address] [facility] [address]  [phone]  Dept: [phone]        Harleen Vargas MD  11/06/21 7530

## 2021-11-06 NOTE — DISCHARGE INSTRUCTIONS
Take meclizine as directed/needed for dizziness. Follow up with primary clinic and ENT for further care and treatment.  Return for worsening symptoms/concerns.

## 2021-11-06 NOTE — ED PROVIDER NOTES
ED SIGNOUT  Date/Time:11/6/2021 2:15 PM    Patient signed out to me by my colleague, Dr. Harleen Vargas.  Please see their note for complete history and physical. Plan to follow up on Samantha Ramos who presents to the ED for evaluation of a feeling of dizziness and lightheadedness, and also noticing gait instability.    The creation of this record is based on the scribe s observations of the work being performed by Dr. Mateo Rivera and the provider s statements to them. It was created on their behalf by Kenny Merchant a trained medical scribe. This document has been checked and approved by the attending provider.      REMAINING ED WORKUP:  MRI, likely discharge if negative    Vitals:  BP (!) 156/84   Pulse 80   Temp 98  F (36.7  C) (Oral)   Resp 18   Wt 108 kg (238 lb)   SpO2 98%   BMI 33.69 kg/m        Pertinent labs results reviewed   Results for orders placed or performed during the hospital encounter of 11/06/21   Head CT w/o contrast    Impression    IMPRESSION:  1.  No evidence of acute intracranial hemorrhage, mass, or herniation.  2.  Large right mastoid effusion with mild opacification of the right middle ear cavity. This could be inflammatory or possibly infectious in etiology.     Extra Red Top Tube   Result Value Ref Range    Hold Specimen JIC    Result Value Ref Range    INR 1.14 0.85 - 1.15   Partial thromboplastin time   Result Value Ref Range    aPTT 37 22 - 38 Seconds   Basic metabolic panel   Result Value Ref Range    Sodium 139 136 - 145 mmol/L    Potassium 4.2 3.5 - 5.0 mmol/L    Chloride 106 98 - 107 mmol/L    Carbon Dioxide (CO2) 21 (L) 22 - 31 mmol/L    Anion Gap 12 5 - 18 mmol/L    Urea Nitrogen 15 8 - 28 mg/dL    Creatinine 0.98 0.70 - 1.30 mg/dL    Calcium 9.2 8.5 - 10.5 mg/dL    Glucose 126 (H) 70 - 125 mg/dL    GFR Estimate 73 >60 mL/min/1.73m2   Result Value Ref Range    Troponin I 0.01 0.00 - 0.29 ng/mL   TSH with free T4 reflex   Result Value Ref Range    TSH 1.21 0.30 - 5.00  uIU/mL   Result Value Ref Range    Magnesium 1.7 (L) 1.8 - 2.6 mg/dL   UA with Microscopic reflex to Culture    Specimen: Urine, Clean Catch   Result Value Ref Range    Color Urine Yellow Colorless, Straw, Light Yellow, Yellow    Appearance Urine Clear Clear    Glucose Urine Negative Negative mg/dL    Bilirubin Urine Negative Negative    Ketones Urine Negative Negative mg/dL    Specific Gravity Urine 1.023 1.001 - 1.030    Blood Urine 0.03 mg/dL (A) Negative    pH Urine 6.5 5.0 - 7.0    Protein Albumin Urine 10  (A) Negative mg/dL    Urobilinogen Urine <2.0 <2.0 mg/dL    Nitrite Urine Negative Negative    Leukocyte Esterase Urine Negative Negative    Bacteria Urine Few (A) None Seen /HPF    Mucus Urine Present (A) None Seen /LPF    RBC Urine 4 (H) <=2 /HPF    WBC Urine <1 <=5 /HPF   CBC with platelets and differential   Result Value Ref Range    WBC Count 5.9 4.0 - 11.0 10e3/uL    RBC Count 4.37 (L) 4.40 - 5.90 10e6/uL    Hemoglobin 14.2 13.3 - 17.7 g/dL    Hematocrit 42.2 40.0 - 53.0 %    MCV 97 78 - 100 fL    MCH 32.5 26.5 - 33.0 pg    MCHC 33.6 31.5 - 36.5 g/dL    RDW 13.2 10.0 - 15.0 %    Platelet Count 167 150 - 450 10e3/uL    % Neutrophils 73 %    % Lymphocytes 15 %    % Monocytes 8 %    % Eosinophils 3 %    % Basophils 0 %    % Immature Granulocytes 1 %    NRBCs per 100 WBC 0 <1 /100    Absolute Neutrophils 4.3 1.6 - 8.3 10e3/uL    Absolute Lymphocytes 0.9 0.8 - 5.3 10e3/uL    Absolute Monocytes 0.5 0.0 - 1.3 10e3/uL    Absolute Eosinophils 0.2 0.0 - 0.7 10e3/uL    Absolute Basophils 0.0 0.0 - 0.2 10e3/uL    Absolute Immature Granulocytes 0.0 <=0.0 10e3/uL    Absolute NRBCs 0.0 10e3/uL       Pertinent imaging reviewed   Please see official radiology report.  Head CT w/o contrast   Final Result   IMPRESSION:   1.  No evidence of acute intracranial hemorrhage, mass, or herniation.   2.  Large right mastoid effusion with mild opacification of the right middle ear cavity. This could be inflammatory or possibly  infectious in etiology.              Interventions  Medications   meclizine (ANTIVERT) tablet 25 mg (25 mg Oral Given 11/6/21 1043)        ED Course/MDM:  2:08 PM Signout accepted from Dr. Harleen Vargas. Prior records were reviewed.  Diagnostics from this visit are reviewed.  2:16 PM I introduced myself to the patient.  Pt refusing MRI.  Counseled on reasons for it, but still uncomfortable. Will sign out AMA, provide Rx for meclizine.  Pt understands strict return precautions and that he is able to return at any time if he likes.  Pt is able to get up and dress himself, ambulatory.         Patient is requesting to leave the hospital against medical advice.  The patient is clinically sober, free from distracting injury, appears to have intact insight and judgment and reason, and in my opinion has the capacity to make decisions.  We had a lengthy discussion regarding their presenting signs and symptoms, including my concerns regarding them with the need for further evaluation and management.  They have verbalized understanding of these concerns.  I discussed in detail with the patient that if they leave, they could significantly worsen, become critically ill, and even possibly become disabled or die.  Despite offering alternative pathways to care, I am unable to convince the patient to stay.  I have asked them to return to the emergency department at any time if they change their mind, and we will be happy to resume their care.  I answered all additional questions and encouraged close follow up with a primary care provider if they choose not return to our ED.  The patient and family have no further questions at this time and understand they are always welcome back.                  1. Dizziness    2. Gait difficulty          Mateo Rivera,   Emergency Medicine  [facility] EMERGENCY ROOM  11/6/2021     Mateo Rivera MD  11/06/21 5460

## 2021-11-06 NOTE — ED NOTES
Pt refused MRI per MRI staff. Report was that pt became claustrophobic. Did not want to try it even with meds. Darron Molina student informed to let Dr Vargas know when available.

## 2021-11-06 NOTE — ED TRIAGE NOTES
Pt presents to the ED with c/o of dizziness that started yesterday am when he woke up.  Pt reports having unsteadiness when walking due to dizziness. Pt is on blood thinner for Afib. Pt has no other symptoms. Pt denies headache, nausea, speech changes or deficits. Pt reports the dizziness hasn't changed (better or worse) today.  Pt reports having this happen about 10 years ago.

## 2021-11-09 LAB
ATRIAL RATE - MUSE: 300 BPM
DIASTOLIC BLOOD PRESSURE - MUSE: 97 MMHG
INTERPRETATION ECG - MUSE: NORMAL
P AXIS - MUSE: NORMAL DEGREES
PR INTERVAL - MUSE: NORMAL MS
QRS DURATION - MUSE: 150 MS
QT - MUSE: 420 MS
QTC - MUSE: 493 MS
R AXIS - MUSE: 202 DEGREES
SYSTOLIC BLOOD PRESSURE - MUSE: 174 MMHG
T AXIS - MUSE: -5 DEGREES
VENTRICULAR RATE- MUSE: 83 BPM

## 2022-10-10 ENCOUNTER — APPOINTMENT (OUTPATIENT)
Dept: ULTRASOUND IMAGING | Facility: CLINIC | Age: 82
End: 2022-10-10
Attending: EMERGENCY MEDICINE
Payer: COMMERCIAL

## 2022-10-10 ENCOUNTER — APPOINTMENT (OUTPATIENT)
Dept: RADIOLOGY | Facility: CLINIC | Age: 82
End: 2022-10-10
Attending: EMERGENCY MEDICINE
Payer: COMMERCIAL

## 2022-10-10 ENCOUNTER — HOSPITAL ENCOUNTER (OUTPATIENT)
Facility: CLINIC | Age: 82
Setting detail: OBSERVATION
Discharge: HOME | End: 2022-10-12
Attending: EMERGENCY MEDICINE | Admitting: INTERNAL MEDICINE
Payer: COMMERCIAL

## 2022-10-10 DIAGNOSIS — K21.9 GASTROESOPHAGEAL REFLUX DISEASE WITHOUT ESOPHAGITIS: Primary | ICD-10-CM

## 2022-10-10 DIAGNOSIS — U07.1 INFECTION DUE TO 2019 NOVEL CORONAVIRUS: ICD-10-CM

## 2022-10-10 DIAGNOSIS — I50.9 ACUTE ON CHRONIC CONGESTIVE HEART FAILURE, UNSPECIFIED HEART FAILURE TYPE (H): ICD-10-CM

## 2022-10-10 DIAGNOSIS — D69.6 THROMBOCYTOPENIA (H): ICD-10-CM

## 2022-10-10 DIAGNOSIS — D72.819 LEUKOPENIA, UNSPECIFIED TYPE: ICD-10-CM

## 2022-10-10 PROBLEM — K44.9 DIAPHRAGMATIC HERNIA: Status: ACTIVE | Noted: 2022-10-10

## 2022-10-10 PROBLEM — I83.93 VARICOSE VEINS OF BOTH LOWER EXTREMITIES: Status: ACTIVE | Noted: 2021-09-03

## 2022-10-10 PROBLEM — H90.3 SENSORINEURAL HEARING LOSS (SNHL) OF BOTH EARS: Status: ACTIVE | Noted: 2021-12-27

## 2022-10-10 PROBLEM — Z87.891 FORMER SMOKER: Status: ACTIVE | Noted: 2021-09-15

## 2022-10-10 LAB
ALBUMIN SERPL-MCNC: 3.6 G/DL (ref 3.5–5)
ALBUMIN UR-MCNC: NEGATIVE MG/DL
ALP SERPL-CCNC: 108 U/L (ref 45–120)
ALT SERPL W P-5'-P-CCNC: 114 U/L (ref 0–45)
ANION GAP SERPL CALCULATED.3IONS-SCNC: 12 MMOL/L (ref 5–18)
APPEARANCE UR: CLEAR
AST SERPL W P-5'-P-CCNC: 79 U/L (ref 0–40)
ATRIAL RATE - MUSE: 0 BPM
ATRIAL RATE - MUSE: 30 BPM
BILIRUB DIRECT SERPL-MCNC: 0.3 MG/DL
BILIRUB SERPL-MCNC: 0.8 MG/DL (ref 0–1)
BILIRUB UR QL STRIP: NEGATIVE
BNP SERPL-MCNC: 191 PG/ML (ref 0–84)
BUN SERPL-MCNC: 22 MG/DL (ref 8–28)
C REACTIVE PROTEIN LHE: 0.6 MG/DL (ref 0–?)
CALCIUM SERPL-MCNC: 9.1 MG/DL (ref 8.5–10.5)
CHLORIDE BLD-SCNC: 103 MMOL/L (ref 98–107)
CO2 SERPL-SCNC: 21 MMOL/L (ref 22–31)
COLOR UR AUTO: NORMAL
CREAT SERPL-MCNC: 1.26 MG/DL (ref 0.7–1.3)
CREAT UR-MCNC: 83 MG/DL
D DIMER PPP FEU-MCNC: 0.63 UG/ML FEU (ref 0–0.5)
DIASTOLIC BLOOD PRESSURE - MUSE: NORMAL MMHG
DIASTOLIC BLOOD PRESSURE - MUSE: NORMAL MMHG
ERYTHROCYTE [DISTWIDTH] IN BLOOD BY AUTOMATED COUNT: 13.2 % (ref 10–15)
FLUAV RNA SPEC QL NAA+PROBE: NEGATIVE
FLUBV RNA RESP QL NAA+PROBE: NEGATIVE
GFR SERPL CREATININE-BSD FRML MDRD: 58 ML/MIN/1.73M2
GLUCOSE BLD-MCNC: 104 MG/DL (ref 70–125)
GLUCOSE UR STRIP-MCNC: NEGATIVE MG/DL
HCT VFR BLD AUTO: 41.7 % (ref 40–53)
HGB BLD-MCNC: 14 G/DL (ref 13.3–17.7)
HGB UR QL STRIP: NEGATIVE
INTERPRETATION ECG - MUSE: NORMAL
INTERPRETATION ECG - MUSE: NORMAL
KETONES UR STRIP-MCNC: NEGATIVE MG/DL
LEUKOCYTE ESTERASE UR QL STRIP: NEGATIVE
MAGNESIUM SERPL-MCNC: 1.6 MG/DL (ref 1.8–2.6)
MCH RBC QN AUTO: 33.1 PG (ref 26.5–33)
MCHC RBC AUTO-ENTMCNC: 33.6 G/DL (ref 31.5–36.5)
MCV RBC AUTO: 99 FL (ref 78–100)
NITRATE UR QL: NEGATIVE
P AXIS - MUSE: NORMAL DEGREES
P AXIS - MUSE: NORMAL DEGREES
PH UR STRIP: 5 [PH] (ref 5–7)
PLATELET # BLD AUTO: 120 10E3/UL (ref 150–450)
POTASSIUM BLD-SCNC: 4.4 MMOL/L (ref 3.5–5)
PR INTERVAL - MUSE: NORMAL MS
PR INTERVAL - MUSE: NORMAL MS
PROT SERPL-MCNC: 6.2 G/DL (ref 6–8)
QRS DURATION - MUSE: 0 MS
QRS DURATION - MUSE: 140 MS
QT - MUSE: 0 MS
QT - MUSE: 556 MS
QTC - MUSE: 0 MS
QTC - MUSE: 486 MS
R AXIS - MUSE: -66 DEGREES
R AXIS - MUSE: 0 DEGREES
RBC # BLD AUTO: 4.23 10E6/UL (ref 4.4–5.9)
RSV RNA SPEC NAA+PROBE: NEGATIVE
SARS-COV-2 RNA RESP QL NAA+PROBE: POSITIVE
SODIUM SERPL-SCNC: 136 MMOL/L (ref 136–145)
SP GR UR STRIP: 1.01 (ref 1–1.03)
SYSTOLIC BLOOD PRESSURE - MUSE: NORMAL MMHG
SYSTOLIC BLOOD PRESSURE - MUSE: NORMAL MMHG
T AXIS - MUSE: -33 DEGREES
T AXIS - MUSE: 0 DEGREES
TROPONIN I SERPL-MCNC: 0.03 NG/ML (ref 0–0.29)
TSH SERPL DL<=0.005 MIU/L-ACNC: 0.89 UIU/ML (ref 0.3–5)
UROBILINOGEN UR STRIP-MCNC: <2 MG/DL
VENTRICULAR RATE- MUSE: 0 BPM
VENTRICULAR RATE- MUSE: 46 BPM
WBC # BLD AUTO: 3.9 10E3/UL (ref 4–11)

## 2022-10-10 PROCEDURE — 85379 FIBRIN DEGRADATION QUANT: CPT | Performed by: EMERGENCY MEDICINE

## 2022-10-10 PROCEDURE — 99285 EMERGENCY DEPT VISIT HI MDM: CPT | Mod: 25

## 2022-10-10 PROCEDURE — 36415 COLL VENOUS BLD VENIPUNCTURE: CPT | Performed by: STUDENT IN AN ORGANIZED HEALTH CARE EDUCATION/TRAINING PROGRAM

## 2022-10-10 PROCEDURE — 250N000013 HC RX MED GY IP 250 OP 250 PS 637: Performed by: STUDENT IN AN ORGANIZED HEALTH CARE EDUCATION/TRAINING PROGRAM

## 2022-10-10 PROCEDURE — 36415 COLL VENOUS BLD VENIPUNCTURE: CPT | Performed by: EMERGENCY MEDICINE

## 2022-10-10 PROCEDURE — 81003 URINALYSIS AUTO W/O SCOPE: CPT | Performed by: STUDENT IN AN ORGANIZED HEALTH CARE EDUCATION/TRAINING PROGRAM

## 2022-10-10 PROCEDURE — 99223 1ST HOSP IP/OBS HIGH 75: CPT | Mod: AI | Performed by: STUDENT IN AN ORGANIZED HEALTH CARE EDUCATION/TRAINING PROGRAM

## 2022-10-10 PROCEDURE — 93005 ELECTROCARDIOGRAM TRACING: CPT | Performed by: EMERGENCY MEDICINE

## 2022-10-10 PROCEDURE — 84300 ASSAY OF URINE SODIUM: CPT | Performed by: STUDENT IN AN ORGANIZED HEALTH CARE EDUCATION/TRAINING PROGRAM

## 2022-10-10 PROCEDURE — 250N000011 HC RX IP 250 OP 636: Performed by: EMERGENCY MEDICINE

## 2022-10-10 PROCEDURE — 250N000013 HC RX MED GY IP 250 OP 250 PS 637: Performed by: EMERGENCY MEDICINE

## 2022-10-10 PROCEDURE — 83735 ASSAY OF MAGNESIUM: CPT | Performed by: EMERGENCY MEDICINE

## 2022-10-10 PROCEDURE — 71045 X-RAY EXAM CHEST 1 VIEW: CPT

## 2022-10-10 PROCEDURE — C9803 HOPD COVID-19 SPEC COLLECT: HCPCS

## 2022-10-10 PROCEDURE — 85027 COMPLETE CBC AUTOMATED: CPT | Performed by: EMERGENCY MEDICINE

## 2022-10-10 PROCEDURE — 120N000001 HC R&B MED SURG/OB

## 2022-10-10 PROCEDURE — 82570 ASSAY OF URINE CREATININE: CPT | Performed by: STUDENT IN AN ORGANIZED HEALTH CARE EDUCATION/TRAINING PROGRAM

## 2022-10-10 PROCEDURE — 82248 BILIRUBIN DIRECT: CPT | Performed by: STUDENT IN AN ORGANIZED HEALTH CARE EDUCATION/TRAINING PROGRAM

## 2022-10-10 PROCEDURE — 96374 THER/PROPH/DIAG INJ IV PUSH: CPT

## 2022-10-10 PROCEDURE — 93971 EXTREMITY STUDY: CPT | Mod: RT

## 2022-10-10 PROCEDURE — 82310 ASSAY OF CALCIUM: CPT | Performed by: EMERGENCY MEDICINE

## 2022-10-10 PROCEDURE — 87637 SARSCOV2&INF A&B&RSV AMP PRB: CPT | Performed by: EMERGENCY MEDICINE

## 2022-10-10 PROCEDURE — 86140 C-REACTIVE PROTEIN: CPT | Performed by: STUDENT IN AN ORGANIZED HEALTH CARE EDUCATION/TRAINING PROGRAM

## 2022-10-10 PROCEDURE — 84484 ASSAY OF TROPONIN QUANT: CPT | Performed by: EMERGENCY MEDICINE

## 2022-10-10 PROCEDURE — 83880 ASSAY OF NATRIURETIC PEPTIDE: CPT | Performed by: EMERGENCY MEDICINE

## 2022-10-10 PROCEDURE — 84443 ASSAY THYROID STIM HORMONE: CPT | Performed by: STUDENT IN AN ORGANIZED HEALTH CARE EDUCATION/TRAINING PROGRAM

## 2022-10-10 RX ORDER — FUROSEMIDE 10 MG/ML
20 INJECTION INTRAMUSCULAR; INTRAVENOUS ONCE
Status: COMPLETED | OUTPATIENT
Start: 2022-10-10 | End: 2022-10-10

## 2022-10-10 RX ORDER — MAGNESIUM OXIDE 400 MG/1
400 TABLET ORAL ONCE
Status: COMPLETED | OUTPATIENT
Start: 2022-10-10 | End: 2022-10-10

## 2022-10-10 RX ORDER — SULFACETAMIDE SODIUM AND PREDNISOLONE SODIUM PHOSPHATE 100; 2.3 MG/ML; MG/ML
4-6 SOLUTION/ DROPS OPHTHALMIC 2 TIMES DAILY
Status: DISCONTINUED | OUTPATIENT
Start: 2022-10-11 | End: 2022-10-12 | Stop reason: HOSPADM

## 2022-10-10 RX ORDER — METOPROLOL SUCCINATE 25 MG/1
25 TABLET, EXTENDED RELEASE ORAL AT BEDTIME
Status: DISCONTINUED | OUTPATIENT
Start: 2022-10-10 | End: 2022-10-12 | Stop reason: HOSPADM

## 2022-10-10 RX ORDER — OMEPRAZOLE 10 MG/1
10 CAPSULE, DELAYED RELEASE ORAL DAILY
Status: DISCONTINUED | OUTPATIENT
Start: 2022-10-11 | End: 2022-10-10 | Stop reason: CLARIF

## 2022-10-10 RX ORDER — PANTOPRAZOLE SODIUM 20 MG/1
20 TABLET, DELAYED RELEASE ORAL
Status: DISCONTINUED | OUTPATIENT
Start: 2022-10-11 | End: 2022-10-12 | Stop reason: HOSPADM

## 2022-10-10 RX ORDER — NICOTINE POLACRILEX 2 MG
1 GUM BUCCAL EVERY MORNING
COMMUNITY
End: 2024-07-13

## 2022-10-10 RX ORDER — TIMOLOL MALEATE 5 MG/ML
1 SOLUTION/ DROPS OPHTHALMIC EVERY MORNING
COMMUNITY

## 2022-10-10 RX ORDER — LIDOCAINE 40 MG/G
CREAM TOPICAL
Status: DISCONTINUED | OUTPATIENT
Start: 2022-10-10 | End: 2022-10-12 | Stop reason: HOSPADM

## 2022-10-10 RX ORDER — SULFACETAMIDE SODIUM AND PREDNISOLONE SODIUM PHOSPHATE 100; 2.3 MG/ML; MG/ML
4-6 SOLUTION/ DROPS OPHTHALMIC 2 TIMES DAILY
COMMUNITY
End: 2024-07-13

## 2022-10-10 RX ADMIN — Medication 400 MG: at 17:09

## 2022-10-10 RX ADMIN — FUROSEMIDE 20 MG: 10 INJECTION, SOLUTION INTRAVENOUS at 17:10

## 2022-10-10 RX ADMIN — APIXABAN 5 MG: 5 TABLET, FILM COATED ORAL at 22:07

## 2022-10-10 ASSESSMENT — ENCOUNTER SYMPTOMS
COLOR CHANGE: 0
SHORTNESS OF BREATH: 1
FREQUENCY: 1
BRUISES/BLEEDS EASILY: 1
CONFUSION: 0
BACK PAIN: 0
FEVER: 0
ABDOMINAL PAIN: 0
DIZZINESS: 0

## 2022-10-10 ASSESSMENT — ACTIVITIES OF DAILY LIVING (ADL)
ADLS_ACUITY_SCORE: 35
ADLS_ACUITY_SCORE: 24
ADLS_ACUITY_SCORE: 24

## 2022-10-10 NOTE — ED PROVIDER NOTES
EMERGENCY DEPARTMENT ENCOUNTER      NAME: Samantha Ramos  AGE: 81 year old male  YOB: 1940  MRN: [id number]  EVALUATION DATE & TIME: 10/10/2022 12:58 PM    PCP: Chris, [facility]    ED PROVIDER: Dax Estevez MD        Chief Complaint   Patient presents with     Leg Swelling     Shortness of Breath         FINAL IMPRESSION:  1. Thrombocytopenia (H)    2. Leukopenia, unspecified type    3. Acute on chronic congestive heart failure, unspecified heart failure type (H)          ED COURSE & MEDICAL DECISION MAKING:    Pertinent Labs & Imaging studies reviewed. (See chart for details)  81 year old male presents to the Emergency Department for evaluation of dyspnea on exertion, leg swelling despite started himself back on Lasix last few days    Plan for labs, chest x-ray, EKG, ultrasound right lower extremity    Exam not suggestive of cellulitis    US negative for DVT    Normal age-adjusted D-dimer therefore PE unlikely.      Elevated BNP almost doubled from previous    No crackles or wheezing    EKG shows no ischemic changes lateral leads    Troponin normal.    Decreased magnesium.  Given oral magnesium.  Plan for IV diuresis based on likely CHF exacerbation      1:00 PM The medical student met the patient.   1:09 PM I was updated by the medical student.  1:15 PM I met with the patient and performed my initial exam.  I discussed the plan for care and the patient is agreeable with this plan. PPE: Provider wore gloves, N95 mask, eye protection.         At the conclusion of the encounter I discussed the results of all of the tests and the disposition. The questions were answered. The patient or family acknowledged understanding and was agreeable with the care plan.         MEDICATIONS GIVEN IN THE EMERGENCY:  Medications   furosemide (LASIX) injection 20 mg (has no administration in time range)   magnesium oxide (MAG-OX) tablet 400 mg (has no administration in time range)       NEW  "PRESCRIPTIONS STARTED AT TODAY'S ER VISIT  New Prescriptions    No medications on file          =================================================================    HPI    Triage note  \"  Pt has right leg swelling with SOB x 1 week, worsening. Hx of afib, but this is chronic for him.      Triage Assessment     Row Name 10/10/22 1126       Triage Assessment (Adult)    Airway WDL WDL       Respiratory WDL    Respiratory WDL X;all    Rhythm/Pattern, Respiratory shortness of breath       Skin Circulation/Temperature WDL    Skin Circulation/Temperature WDL WDL       Cardiac WDL    Cardiac WDL WDL       Peripheral/Neurovascular WDL    Peripheral Neurovascular WDL WDL       Cognitive/Neuro/Behavioral WDL    Cognitive/Neuro/Behavioral WDL WDL              \"      Patient information was obtained from: the patient    Use of : N/A      Samantha Ramos is a 81 year old male with a pertinent history of atrial fibrilation, heart failure, JONATHAN on CPAP, hypertension, GERD, former smoker who presents to this ED by personal vehicle for evaluation of leg swelling, shortness of breath.    The patient presents to the ED reporting he had stopped taking his lasix for an unspecified period of time. Over the last 3 weeks, he has been experiencing increased shortness of breath at both rest and with exertion. He also endorses BLE swelling.  He restarted taking his lasix ~3 days ago and has noticed increased urination since restarting this medication.     Of note, per the patient's note, he was on 20 mg of Lasix.     The patient denies skin redness, chest pain, and any other symptoms or complaints at this time.     MHx: Afib. On metoprolol. On Eliquis. No stent placements.   SHx: the patient noted he has been drinking more lately.        REVIEW OF SYSTEMS   Review of Systems   Constitutional: Negative for fever.   HENT: Negative for drooling.    Respiratory: Positive for shortness of breath (with exertion and at rest).  "   Cardiovascular: Positive for leg swelling. Negative for chest pain.   Gastrointestinal: Negative for abdominal pain.   Genitourinary: Positive for frequency (after restarting lasix).   Musculoskeletal: Negative for back pain.   Skin: Negative for color change (redness).   Neurological: Negative for dizziness.   Hematological: Bruises/bleeds easily.   Psychiatric/Behavioral: Negative for confusion.       PAST MEDICAL HISTORY:  No past medical history on file.    PAST SURGICAL HISTORY:  Past Surgical History:   Procedure Laterality Date     APPENDECTOMY  14 years old     CATARACT EXTRACTION Bilateral 2017,2018     HERNIA REPAIR Bilateral 1976     OTHER SURGICAL HISTORY  01/09/2012    septoturbinoplasty     TOTAL KNEE ARTHROPLASTY Left 2012     VEIN LIGATION AND STRIPPING Left 1995     ZZC TOTAL KNEE ARTHROPLASTY Right 1/11/2018    Procedure: RIGHT MINIMALLY INVASIVE TOTAL KNEE ARTHROPLASTY;  Surgeon: Kurt Piper MD;  Location: [facility];  Service: Orthopedics           CURRENT MEDICATIONS:    acetaminophen (TYLENOL) 500 MG tablet  aluminum-magnesium hydroxide-simethicone (MAALOX ADVANCED) 200-200-20 mg/5 mL Susp  apixaban (ELIQUIS) 5 mg Tab tablet  cholecalciferol, vitamin D3, 1,000 unit tablet  cyanocobalamin 1000 MCG tablet  furosemide (LASIX) 20 MG tablet  GLUCOSAMINE HCL/CHONDR THOMAS A NA (OSTEO BI-FLEX ORAL)  ketorolac (ACULAR LS) 0.4 % Drop  latanoprost (XALATAN) 0.005 % ophthalmic solution  meclizine (ANTIVERT) 25 MG tablet  metoprolol succinate (TOPROL-XL) 25 MG  multivitamin therapeutic (THERAGRAN) tablet  omeprazole (PRILOSEC) 10 MG capsule  prednisoLONE acetate (PRED-FORTE) 1 % ophthalmic suspension  traMADol (ULTRAM) 50 mg tablet        ALLERGIES:  Allergies   Allergen Reactions     Indocin [Indomethacin] Itching       FAMILY HISTORY:  Family History   Problem Relation Age of Onset     Pacemaker Father        SOCIAL HISTORY:   Social History     Socioeconomic History     Marital status:   "  Tobacco Use     Smoking status: Former     Packs/day: 3.00     Types: Cigarettes     Quit date: 9/10/1988     Years since quittin.1     Smokeless tobacco: Never   Substance and Sexual Activity     Alcohol use: Yes     Alcohol/week: 28.0 standard drinks     Comment: Alcoholic Drinks/day: 4 beers a day     Drug use: No       VITALS:  BP (!) 184/79   Pulse (!) 48   Temp 99  F (37.2  C) (Temporal)   Resp 18   Ht 1.803 m (5' 11\")   Wt 115.2 kg (254 lb)   SpO2 98%   BMI 35.43 kg/m      PHYSICAL EXAM      Vitals: BP (!) 184/79   Pulse (!) 48   Temp 99  F (37.2  C) (Temporal)   Resp 18   Ht 1.803 m (5' 11\")   Wt 115.2 kg (254 lb)   SpO2 98%   BMI 35.43 kg/m    General: Appears in no acute distress, awake, alert, interactive.  Eyes: Conjunctivae non-injected. Sclera anicteric.  HENT: Atraumatic.  Neck: Supple.  Respiratory/Chest: Respiration unlabored. No wheezes or crackles. Mild increased WOB at rest.   Abdomen: non distended  Musculoskeletal: Normal extremities. No erythema. BLE edema.   Skin: Normal color. No rash or diaphoresis.  Neurologic: Face symmetric, moves all extremities spontaneously. Speech clear.  Psychiatric: Oriented to person, place, and time. Affect appropriate.       LAB:  All pertinent labs reviewed and interpreted.  Results for orders placed or performed during the hospital encounter of 10/10/22   US Lower Extremity Venous Duplex Right    Impression    IMPRESSION:  1.  No deep venous thrombosis in the right leg.   XR Chest Port 1 View    Impression    IMPRESSION: Negative chest.   CBC (+ platelets, no diff)   Result Value Ref Range    WBC Count 3.9 (L) 4.0 - 11.0 10e3/uL    RBC Count 4.23 (L) 4.40 - 5.90 10e6/uL    Hemoglobin 14.0 13.3 - 17.7 g/dL    Hematocrit 41.7 40.0 - 53.0 %    MCV 99 78 - 100 fL    MCH 33.1 (H) 26.5 - 33.0 pg    MCHC 33.6 31.5 - 36.5 g/dL    RDW 13.2 10.0 - 15.0 %    Platelet Count 120 (L) 150 - 450 10e3/uL   Basic metabolic panel   Result Value Ref Range    " Sodium 136 136 - 145 mmol/L    Potassium 4.4 3.5 - 5.0 mmol/L    Chloride 103 98 - 107 mmol/L    Carbon Dioxide (CO2) 21 (L) 22 - 31 mmol/L    Anion Gap 12 5 - 18 mmol/L    Urea Nitrogen 22 8 - 28 mg/dL    Creatinine 1.26 0.70 - 1.30 mg/dL    Calcium 9.1 8.5 - 10.5 mg/dL    Glucose 104 70 - 125 mg/dL    GFR Estimate 58 (L) >60 mL/min/1.73m2   B-Type Natriuretic Peptide ([facility] Only)   Result Value Ref Range     (H) 0 - 84 pg/mL   Troponin I (now)   Result Value Ref Range    Troponin I 0.03 0.00 - 0.29 ng/mL   D dimer quantitative   Result Value Ref Range    D-Dimer Quantitative 0.63 (H) 0.00 - 0.50 ug/mL FEU   Result Value Ref Range    Magnesium 1.6 (L) 1.8 - 2.6 mg/dL       RADIOLOGY:  Reviewed all pertinent imaging. Please see official radiology report.  XR Chest Port 1 View   Final Result   IMPRESSION: Negative chest.      US Lower Extremity Venous Duplex Right   Final Result   IMPRESSION:   1.  No deep venous thrombosis in the right leg.          EKG:    Performed at: 10 Oct 2022 1219    Impression: A. fib with slow ventricular response    Rate:  46  Rhythm: A. fib  Axis: -66  MS Interval:   QRS Interval: T wave abnormality inferior lateral leads new compared to 2021  QTc Interval: 486  ST Changes: T wave inversion inferior lateral leads  Comparison: T wave inverisin lateral leads new from 2021    I have independently reviewed and interpreted the EKG(s) documented above.    PROCEDURES:       Deepak Estevez MD  Emergency Medicine  [facility] EMERGENCY ROOM  [address] [address]  [phone]     Deepak Estevez MD  10/10/22 0119

## 2022-10-10 NOTE — PHARMACY-ADMISSION MEDICATION HISTORY
Pharmacy Note - Admission Medication History    Pertinent Provider Information:     Patient mentioned relief eye drops - wife will bring in to verify eye drops    Patient just started an ear drop for ear infection - sulfacetamide/prednisolone ______________________________________________________________________    Prior To Admission (PTA) med list completed and updated in EMR.       PTA Med List   Medication Sig Note Last Dose     apixaban (ELIQUIS) 5 mg Tab tablet [APIXABAN (ELIQUIS) 5 MG TAB TABLET] Take 1 tablet (5 mg total) by mouth 2 (two) times a day.  10/10/2022 at AM     cholecalciferol, vitamin D3, 1,000 unit tablet [CHOLECALCIFEROL, VITAMIN D3, 1,000 UNIT TABLET] Take 2,000 Units by mouth daily.   10/10/2022 at AM     furosemide (LASIX) 20 MG tablet [FUROSEMIDE (LASIX) 20 MG TABLET] Take 20 mg by mouth 2 (two) times a day. 10/10/2022: Patient had not been taking furosemide, but started to take the last 3 days 10/10/2022 at AM     latanoprost (XALATAN) 0.005 % ophthalmic solution [LATANOPROST (XALATAN) 0.005 % OPHTHALMIC SOLUTION] Administer 1 drop to both eyes at bedtime.  10/9/2022 at PM, will bring in     meclizine (ANTIVERT) 25 MG tablet Take 1 tablet (25 mg) by mouth 3 times daily as needed for dizziness or nausea  More than a month     metoprolol succinate (TOPROL-XL) 25 MG [METOPROLOL SUCCINATE (TOPROL-XL) 25 MG] TAKE ONE TABLET BY MOUTH EVERY DAY  10/9/2022 at PM     omeprazole (PRILOSEC) 10 MG capsule Take 1 capsule (10 mg) by mouth daily  10/10/2022 at AM     sulfacetamide-prednisoLONE (VASOCIDIN) 10-0.23 % ophthalmic solution Place 4-6 drops into the right ear 2 times daily X 14 days 10/10/2022: Started 10/10/22 AM 10/10/2022 at AM.  Will bring in     tetrahydrozoline-Zn sulfate (RELIEF DROPS) 0.05-0.25 % ophthalmic solution Place 1 drop into both eyes every morning  10/10/2022 at AM     timolol maleate (TIMOPTIC) 0.5 % ophthalmic solution Place 1 drop into the right eye every morning   10/10/2022 at AM.  Will bring in       Information source(s): Patient and CareEverywhere/SureScripts  Method of interview communication: in-person    Summary of Changes to PTA Med List  New: relief, sulfacetamide-prednisolone, timolol  Discontinued: tylenol, maalox, b12, glucosame-chondroitin, ketorolac eye drops, multivitamin, prednisolone eye drops  Changed: none    Patient was asked about OTC/herbal products specifically.  PTA med list reflects this.    In the past week, patient estimated taking medication this percent of the time:  greater than 90%.    Allergies were reviewed, assessed, and updated with the patient.      Medications available for use during hospital stay: Will bring in all eye drops.     The information provided in this note is only as accurate as the sources available at the time of the update(s).    Thank you for the opportunity to participate in the care of this patient.    Kristine Alejo RPH  10/10/2022 4:48 PM

## 2022-10-10 NOTE — ED TRIAGE NOTES
Pt has right leg swelling with SOB x 1 week, worsening. Hx of afib, but this is chronic for him.      Triage Assessment     Row Name 10/10/22 1126       Triage Assessment (Adult)    Airway WDL WDL       Respiratory WDL    Respiratory WDL X;all    Rhythm/Pattern, Respiratory shortness of breath       Skin Circulation/Temperature WDL    Skin Circulation/Temperature WDL WDL       Cardiac WDL    Cardiac WDL WDL       Peripheral/Neurovascular WDL    Peripheral Neurovascular WDL WDL       Cognitive/Neuro/Behavioral WDL    Cognitive/Neuro/Behavioral WDL WDL

## 2022-10-11 ENCOUNTER — APPOINTMENT (OUTPATIENT)
Dept: CARDIOLOGY | Facility: CLINIC | Age: 82
End: 2022-10-11
Attending: STUDENT IN AN ORGANIZED HEALTH CARE EDUCATION/TRAINING PROGRAM
Payer: COMMERCIAL

## 2022-10-11 LAB
ALBUMIN SERPL-MCNC: 3.8 G/DL (ref 3.5–5)
ALP SERPL-CCNC: 107 U/L (ref 45–120)
ALT SERPL W P-5'-P-CCNC: 108 U/L (ref 0–45)
ANION GAP SERPL CALCULATED.3IONS-SCNC: 13 MMOL/L (ref 5–18)
AST SERPL W P-5'-P-CCNC: 68 U/L (ref 0–40)
BASOPHILS # BLD AUTO: 0 10E3/UL (ref 0–0.2)
BASOPHILS NFR BLD AUTO: 0 %
BILIRUB SERPL-MCNC: 0.9 MG/DL (ref 0–1)
BUN SERPL-MCNC: 25 MG/DL (ref 8–28)
CALCIUM SERPL-MCNC: 9.2 MG/DL (ref 8.5–10.5)
CHLORIDE BLD-SCNC: 104 MMOL/L (ref 98–107)
CO2 SERPL-SCNC: 23 MMOL/L (ref 22–31)
CREAT SERPL-MCNC: 1.31 MG/DL (ref 0.7–1.3)
EOSINOPHIL # BLD AUTO: 0 10E3/UL (ref 0–0.7)
EOSINOPHIL NFR BLD AUTO: 1 %
ERYTHROCYTE [DISTWIDTH] IN BLOOD BY AUTOMATED COUNT: 13.2 % (ref 10–15)
GFR SERPL CREATININE-BSD FRML MDRD: 55 ML/MIN/1.73M2
GLUCOSE BLD-MCNC: 112 MG/DL (ref 70–125)
HAV IGM SERPL QL IA: NONREACTIVE
HBV CORE IGM SERPL QL IA: NONREACTIVE
HBV SURFACE AG SERPL QL IA: NONREACTIVE
HCT VFR BLD AUTO: 43.4 % (ref 40–53)
HCV AB SERPL QL IA: NONREACTIVE
HGB BLD-MCNC: 14.6 G/DL (ref 13.3–17.7)
IMM GRANULOCYTES # BLD: 0 10E3/UL
IMM GRANULOCYTES NFR BLD: 1 %
LVEF ECHO: NORMAL
LYMPHOCYTES # BLD AUTO: 1.1 10E3/UL (ref 0.8–5.3)
LYMPHOCYTES NFR BLD AUTO: 28 %
MAGNESIUM SERPL-MCNC: 1.6 MG/DL (ref 1.8–2.6)
MCH RBC QN AUTO: 33.2 PG (ref 26.5–33)
MCHC RBC AUTO-ENTMCNC: 33.6 G/DL (ref 31.5–36.5)
MCV RBC AUTO: 99 FL (ref 78–100)
MONOCYTES # BLD AUTO: 0.6 10E3/UL (ref 0–1.3)
MONOCYTES NFR BLD AUTO: 16 %
NEUTROPHILS # BLD AUTO: 2.2 10E3/UL (ref 1.6–8.3)
NEUTROPHILS NFR BLD AUTO: 54 %
NRBC # BLD AUTO: 0 10E3/UL
NRBC BLD AUTO-RTO: 0 /100
PLATELET # BLD AUTO: 137 10E3/UL (ref 150–450)
POTASSIUM BLD-SCNC: 3.8 MMOL/L (ref 3.5–5)
PROT SERPL-MCNC: 6.8 G/DL (ref 6–8)
RBC # BLD AUTO: 4.4 10E6/UL (ref 4.4–5.9)
SODIUM SERPL-SCNC: 140 MMOL/L (ref 136–145)
SODIUM UR-SCNC: 86 MMOL/L
WBC # BLD AUTO: 3.9 10E3/UL (ref 4–11)

## 2022-10-11 PROCEDURE — G0378 HOSPITAL OBSERVATION PER HR: HCPCS

## 2022-10-11 PROCEDURE — 80074 ACUTE HEPATITIS PANEL: CPT | Performed by: STUDENT IN AN ORGANIZED HEALTH CARE EDUCATION/TRAINING PROGRAM

## 2022-10-11 PROCEDURE — 36415 COLL VENOUS BLD VENIPUNCTURE: CPT | Performed by: STUDENT IN AN ORGANIZED HEALTH CARE EDUCATION/TRAINING PROGRAM

## 2022-10-11 PROCEDURE — 99225 PR SUBSEQUENT OBSERVATION CARE,LEVEL II: CPT | Performed by: INTERNAL MEDICINE

## 2022-10-11 PROCEDURE — 99214 OFFICE O/P EST MOD 30 MIN: CPT | Mod: 25 | Performed by: INTERNAL MEDICINE

## 2022-10-11 PROCEDURE — 999N000157 HC STATISTIC RCP TIME EA 10 MIN

## 2022-10-11 PROCEDURE — 250N000013 HC RX MED GY IP 250 OP 250 PS 637: Performed by: INTERNAL MEDICINE

## 2022-10-11 PROCEDURE — 250N000013 HC RX MED GY IP 250 OP 250 PS 637: Performed by: STUDENT IN AN ORGANIZED HEALTH CARE EDUCATION/TRAINING PROGRAM

## 2022-10-11 PROCEDURE — 85004 AUTOMATED DIFF WBC COUNT: CPT | Performed by: STUDENT IN AN ORGANIZED HEALTH CARE EDUCATION/TRAINING PROGRAM

## 2022-10-11 PROCEDURE — 83735 ASSAY OF MAGNESIUM: CPT | Performed by: STUDENT IN AN ORGANIZED HEALTH CARE EDUCATION/TRAINING PROGRAM

## 2022-10-11 PROCEDURE — 93306 TTE W/DOPPLER COMPLETE: CPT | Mod: 26 | Performed by: INTERNAL MEDICINE

## 2022-10-11 PROCEDURE — 255N000002 HC RX 255 OP 636: Performed by: INTERNAL MEDICINE

## 2022-10-11 PROCEDURE — 80053 COMPREHEN METABOLIC PANEL: CPT | Performed by: STUDENT IN AN ORGANIZED HEALTH CARE EDUCATION/TRAINING PROGRAM

## 2022-10-11 RX ORDER — MAGNESIUM OXIDE 400 MG/1
400 TABLET ORAL EVERY 4 HOURS
Status: COMPLETED | OUTPATIENT
Start: 2022-10-11 | End: 2022-10-11

## 2022-10-11 RX ADMIN — SULFACETAMIDE SODIUM AND PREDNISOLONE SODIUM PHOSPHATE 4 DROP: 100; 2.3 SOLUTION/ DROPS OPHTHALMIC at 20:21

## 2022-10-11 RX ADMIN — APIXABAN 5 MG: 5 TABLET, FILM COATED ORAL at 20:21

## 2022-10-11 RX ADMIN — APIXABAN 5 MG: 5 TABLET, FILM COATED ORAL at 08:21

## 2022-10-11 RX ADMIN — MAGNESIUM OXIDE TAB 400 MG (241.3 MG ELEMENTAL MG) 400 MG: 400 (241.3 MG) TAB at 12:11

## 2022-10-11 RX ADMIN — MAGNESIUM OXIDE TAB 400 MG (241.3 MG ELEMENTAL MG) 400 MG: 400 (241.3 MG) TAB at 15:47

## 2022-10-11 RX ADMIN — PERFLUTREN 2.5 ML: 6.52 INJECTION, SUSPENSION INTRAVENOUS at 11:10

## 2022-10-11 RX ADMIN — PANTOPRAZOLE SODIUM 20 MG: 20 TABLET, DELAYED RELEASE ORAL at 06:41

## 2022-10-11 ASSESSMENT — ACTIVITIES OF DAILY LIVING (ADL)
ADLS_ACUITY_SCORE: 24

## 2022-10-11 NOTE — PLAN OF CARE
Goal Outcome Evaluation:     Overall Patient Progress: improving    Outcome Evaluation: Admitted from ED this evening. A/O, pleasant. Edema to BLE. WBC 3.9. On tele - Afib, RUT Jones. Using home CPAP overnight.

## 2022-10-11 NOTE — H&P
[facility] MEDICINE ADMISSION HISTORY AND PHYSICAL     Assessment & Plan       Samantha Ramos is a 81 year old with past medical history significant for nonischemic cardiomyopathy, chronic A. fib on Eliquis, Faust's esophagus, sleep apnea on CPAP who presented to the hospital on 10/10/2022 with shortness of breath and cough for 1 week.  Was found to be COVID-positive.  EKG shows new T wave inversion lateral leads.    #Shortness of breath.  #COVID infection  #Nonischemic cardiomyopathy with ejection fraction of 40%.  -Possible etiologies include include COVID infection versus mild acute on chronic exacerbation of heart failure versus coronary artery disease.  -BNP elevated around 190.  -EKG showed atrial fibrillation? Flutter with slow ventricular response. T wave inversions in V4-V6 not present a year ago.  -Chest x-ray without any signs of pulmonary edema.  -Troponin negative.  -No reported symptoms of angina over the last few months.  -Baseline creatinine around 1 creatinine on presentation slightly higher.  -Received Lasix 20 mg IV in the emergency department.    Plan:  [] Check echocardiogram.  [] Consult cardiology (given changes on EKG the patient will most likely need a stress test, however, I believe this could be done after he recovers from his COVID infection)  [] Hold Lasix for night.  Resume Lasix tomorrow if creatinine is stable.  [] No indication for dexamethasone given that the patient is not hypoxic.  [] Continue to monitor respiratory status.  [] Check liver panel, if normal we could start remdesivir tomorrow as long as his kidney function is stable.       #Atrial fibrillation.  #Bradycardia  -Follows up with his cardiologist and was last seen in 11/2021.  -Rate controlled with metoprolol.  -Took his metoprolol earlier today.  -Heart rate is currently around 50.    Plan:  [] Continue metoprolol as long as heart rate is above 50.  [] Continue anticoagulation with  Eliquis.  [] Check TSH.  [] Keep on telemetry.      #Sleep apnea    Plan:  [] Nocturnal CPAP.    #Faust's esophagus    Plan:  [] Continue home pantoprazole.          DVTP: Direct Oral Anticagulants   Code Status: Full Code  Disposition: Inpatient   Expected LOS: 2 days   Goals for the hospitalization:   Disposition Plan      Expected Discharge Date: 10/12/2022                The patient's care was discussed with the Patient.  Chief Complaint  shortness of breath.     HISTORY     Samantha Ramos is a 81 year old with past medical history significant for nonischemic cardiomyopathy, chronic A. fib on Eliquis, Faust's esophagus, sleep apnea on CPAP who presented to the hospital on 10/10/2022 with shortness of breath and cough for 1 week.    Patient was usual state of health until around a week ago when he started to experience shortness of breath particularly with exertion.  He also experienced intermittent dry cough.  He denied any runny nose or sore throat.  Denies any sick contacts.  Denies any chest pain, heart palpitation, orthopnea.    He had a history of nonischemic cardiomyopathy with ejection fraction around 40% and he was on Lasix 20 mg daily which was stopped around a year ago.  Given his symptoms and some lower extremity edema, the patient started to take his Lasix again around 4 to 5 days ago.     Vitals on presentation: Blood pressure around 160/70, heart rate around 50, SPO2 95% on room air.  Labs: Creatinine 1.26, baseline around 1, magnesium 1.6, , WBC around 4, hemoglobin 14.  D-dimer 0.63, COVID-positive.  Chest x-ray is unremarkable, bilateral lower extremity venous ultrasound did not show DVT.      Past Medical History     No past medical history on file.     Surgical History     Past Surgical History:   Procedure Laterality Date     APPENDECTOMY  14 years old     CATARACT EXTRACTION Bilateral 2017,2018     HERNIA REPAIR Bilateral 1976     OTHER SURGICAL HISTORY  01/09/2012     septoturbinoplasty     TOTAL KNEE ARTHROPLASTY Left      VEIN LIGATION AND STRIPPING Left      ZZC TOTAL KNEE ARTHROPLASTY Right 2018    Procedure: RIGHT MINIMALLY INVASIVE TOTAL KNEE ARTHROPLASTY;  Surgeon: Kurt Piper MD;  Location: [facility];  Service: Orthopedics     Family History    Reviewed, and   Family History   Problem Relation Age of Onset     Pacemaker Father         Social History      Social History     Tobacco Use     Smoking status: Former     Packs/day: 3.00     Types: Cigarettes     Quit date: 9/10/1988     Years since quittin.1     Smokeless tobacco: Never   Substance Use Topics     Alcohol use: Yes     Alcohol/week: 28.0 standard drinks     Comment: Alcoholic Drinks/day: 4 beers a day     Drug use: No        Allergies     Allergies   Allergen Reactions     Indocin [Indomethacin] Itching     Prior to Admission Medications      Prior to Admission Medications   Prescriptions Last Dose Informant Patient Reported? Taking?   apixaban (ELIQUIS) 5 mg Tab tablet 10/10/2022 at AM  No Yes   Sig: [APIXABAN (ELIQUIS) 5 MG TAB TABLET] Take 1 tablet (5 mg total) by mouth 2 (two) times a day.   cholecalciferol, vitamin D3, 1,000 unit tablet 10/10/2022 at AM  Yes Yes   Sig: [CHOLECALCIFEROL, VITAMIN D3, 1,000 UNIT TABLET] Take 2,000 Units by mouth daily.    furosemide (LASIX) 20 MG tablet 10/10/2022 at AM  Yes Yes   Sig: [FUROSEMIDE (LASIX) 20 MG TABLET] Take 20 mg by mouth 2 (two) times a day.   latanoprost (XALATAN) 0.005 % ophthalmic solution 10/9/2022 at PM, will bring in  Yes Yes   Sig: [LATANOPROST (XALATAN) 0.005 % OPHTHALMIC SOLUTION] Administer 1 drop to both eyes at bedtime.   meclizine (ANTIVERT) 25 MG tablet More than a month  No Yes   Sig: Take 1 tablet (25 mg) by mouth 3 times daily as needed for dizziness or nausea   metoprolol succinate (TOPROL-XL) 25 MG 10/9/2022 at PM  No Yes   Sig: [METOPROLOL SUCCINATE (TOPROL-XL) 25 MG] TAKE ONE TABLET BY MOUTH EVERY DAY    omeprazole (PRILOSEC) 10 MG capsule 10/10/2022 at AM  Yes Yes   Sig: Take 1 capsule (10 mg) by mouth daily   sulfacetamide-prednisoLONE (VASOCIDIN) 10-0.23 % ophthalmic solution 10/10/2022 at AM.  Will bring in  Yes Yes   Sig: Place 4-6 drops into the right ear 2 times daily X 14 days   tetrahydrozoline-Zn sulfate (RELIEF DROPS) 0.05-0.25 % ophthalmic solution 10/10/2022 at AM  Yes Yes   Sig: Place 1 drop into both eyes every morning   timolol maleate (TIMOPTIC) 0.5 % ophthalmic solution 10/10/2022 at AM.  Will bring in  Yes Yes   Sig: Place 1 drop into the right eye every morning      Facility-Administered Medications: None      Review of Systems     A 12 point comprehensive review of systems was negative except as noted above in HPI.    PHYSICAL EXAMINATION       Vitals      Temp:  [97.7  F (36.5  C)-99  F (37.2  C)] 97.7  F (36.5  C)  Pulse:  [45-61] 49  Resp:  [18-20] 20  BP: (152-192)/(69-81) 152/70  SpO2:  [94 %-98 %] 94 %    Examination     GENERAL:  Alert, appears comfortable   LUNGS:   Clear lungs bilaterally without any crackles or wheezing.   CHEST WALL:  No tenderness or deformity   HEART:  Bradycardic, irregular irregular rhythm.  Faint systolic murmur best heard on the aortic valve area.  +1 pitting edema in bilateral lower extremities below knee level.   ABDOMEN:   Soft, non-tender, bowel sounds active all four quadrants, no masses, no organomegaly, no rebound or guarding   SKIN: Dry to touch, no exanthems in the visualized areas   NEURO: Alert, oriented x 4, moves all four extremities freely, non-focal   PSYCH: Cooperative, behavior is appropriate      Pertinent Lab     Most Recent 3 CBC's:Recent Labs   Lab Test 10/10/22  1215 11/06/21  0821 01/13/18  0635 01/12/18  0543 01/11/18  1255   WBC 3.9* 5.9  --   --   --    HGB 14.0 14.2 12.8*   < >  --    MCV 99 97  --   --   --    * 167  --   --  154    < > = values in this interval not displayed.     Most Recent 3 BMP's:Recent Labs   Lab Test  10/10/22  1215 11/06/21  0821 08/07/18  0858    139 137   POTASSIUM 4.4 4.2 4.2   CHLORIDE 103 106 105   CO2 21* 21* 23   BUN 22 15 18   CR 1.26 0.98 0.87   ANIONGAP 12 12 9   KAREEM 9.1 9.2 9.6    126* 109         Pertinent Radiology     Radiology Results:   Recent Results (from the past 24 hour(s))   US Lower Extremity Venous Duplex Right    Narrative    EXAM: US LOWER EXTREMITY VENOUS DUPLEX RIGHT  LOCATION: [facility]  DATE/TIME: 10/10/2022 12:51 PM    INDICATION: right lower leg swelling  COMPARISON: None.  TECHNIQUE: Venous Duplex ultrasound of the right lower extremity with and without compression, augmentation and duplex. Color flow and spectral Doppler with waveform analysis performed.    FINDINGS: Exam includes the common femoral, femoral, popliteal, and contralateral common femoral veins as well as segmentally visualized deep calf veins and greater saphenous vein.     RIGHT: No deep vein thrombosis. No superficial thrombophlebitis. No popliteal cyst.      Impression    IMPRESSION:  1.  No deep venous thrombosis in the right leg.   XR Chest Port 1 View    Narrative    EXAM: XR CHEST PORT 1 VIEW  LOCATION: [facility]  DATE/TIME: 10/10/2022 1:37 PM    INDICATION: sob, elevated bnp  COMPARISON: CT chest 02/17/2016      Impression    IMPRESSION: Negative chest.     EKG Results: personally reviewed.     Advance Care Planning        MATILDE OLSEN MD  [facility]  [facility]  [facility]   Phone: #[phone]

## 2022-10-11 NOTE — PROGRESS NOTES
Care Management Follow Up    Length of Stay (days): 1    Expected Discharge Date: 10/12/2022     Concerns to be Addressed:  Medical Progression    Patient plan of care discussed at interdisciplinary rounds: Yes    Anticipated Discharge Disposition:  Home     Anticipated Discharge Services:  None anticipated  Anticipated Discharge DME:  TBD     Patient/family educated on Medicare website which has current facility and service quality ratings:  No   Education Provided on the Discharge Plan:  No Patient/Family in Agreement with the Plan:  TBD     Referrals Placed by CM/SW:  None   Private pay costs discussed: Not applicable    Additional Information:  SWCM tried to call Pt to complete assessment.  Anticipate no CM needs.       MAGDALENO Tejeda    \

## 2022-10-11 NOTE — PLAN OF CARE
Problem: Risk for Delirium  Goal: Optimal Coping  Outcome: Progressing  Goal: Improved Behavioral Control  Outcome: Progressing  Goal: Improved Attention and Thought Clarity  Outcome: Progressing  Goal: Improved Sleep  Outcome: Progressing     Problem: Infection  Goal: Absence of Infection Signs and Symptoms  Outcome: Progressing   Goal Outcome Evaluation: Patient had echo ordered today, physician will order CT and angiogram if results are abnormal. Patient having non-symptomatic afib. No complaints of pain or discomfort. Paitnet mg 1.6, physician ordered one time dose of magnesium oxide 400mg po. Re-check mg in the morning.

## 2022-10-11 NOTE — PLAN OF CARE
Vss other than HR which has been jorge in the mid 40's and 50's. Sating in the low to mid 90's on RA. A&O. Denies pain. Remains on Mg (1.6) and is a recheck in the AM. Tele monitoring maintained. Plan to discharge home with family once medically cleared. Will continue to monitor.

## 2022-10-11 NOTE — PROGRESS NOTES
[facility] [place]      ASSESSMENT/PLAN:    Samantha Ramos is a 81 year old with past medical history significant for nonischemic cardiomyopathy, chronic A. fib on Eliquis, Faust's esophagus, sleep apnea on CPAP who presented to the hospital on 10/10/2022 with shortness of breath and cough for 1 week.  Was found to be COVID-positive.  EKG shows new T wave inversion lateral leads.     #Shortness of breath.  #COVID infection  #Nonischemic cardiomyopathy with ejection fraction of 40%.  -Possible etiologies include include COVID infection versus mild acute on chronic exacerbation of heart failure versus coronary artery disease.  -BNP elevated around 190.  -EKG showed atrial fibrillation? Flutter with slow ventricular response. T wave inversions in V4-V6 not present a year ago.  -Chest x-ray without any signs of pulmonary edema or COVID pneumonitis  -Troponin negative.  -No reported symptoms of angina over the last few months.  -Baseline creatinine around 1 creatinine on presentation slightly higher.  -Received Lasix 20 mg IV in the emergency department.     Plan:  [] Check echocardiogram.  [] Consult cardiology (given changes on EKG the patient will most likely need a stress test, however, I believe this could be done after he recovers from his COVID infection)  []  Continue with Lasix  [] No indication for dexamethasone given that the patient is not hypoxic, and LFTs are mildly elevated so not a candidate for remdesivir.  [] Continue to monitor respiratory status.         #Atrial fibrillation.  #Bradycardia  -Follows up with his cardiologist and was last seen in 11/2021, follows with [facility].  -Rate controlled with metoprolol.  -Took his metoprolol earlier today.  -Heart rate is currently around 45     Plan:  [] Continue metoprolol as long as heart rate is above 50.  [] Continue anticoagulation with Eliquis.  [] Keep on telemetry.        #Sleep apnea     Plan:  [] Nocturnal CPAP.     #Faust's  "esophagus     Plan:  [] Continue home pantoprazole.              DVTP: Direct Oral Anticagulants   Code Status: Full Code  Disposition: Inpatient   Expected LOS: 2 days   Goals for the hospitalization:   Disposition Plan      Expected Discharge Date: 10/12/2022             The patient's care was discussed with the Patient.        Radu Clark D.O.    -------------------------------------------------------------------------------------------------------------  SUBJECTIVE: NAD. Denies any nausea, vomiting, abdominal pain, chest pain, SOB, new swelling, fevers, chills, confusion or headache.     Overall notes that some mild shortness of breath persist.  This has been ongoing for 3 weeks.  No active chest pains.  He does note right lower extremity edema is better.    Exam:  /60 (BP Location: Left arm)   Pulse 52   Temp 97.7  F (36.5  C) (Oral)   Resp 18   Ht 1.803 m (5' 11\")   Wt 111.5 kg (245 lb 14.4 oz)   SpO2 93%   BMI 34.30 kg/m    General: NAD  RESPIRATORY: Breathing nonlabored.  Clear to auscultation bilaterally.  CARDIOVASCULAR: No significant le edema bilat. heart rate slow, no significant murmurs.  ABDOMEN: soft and non-tender  MUSCULOSKELETAL: Muscle and joints without inflammation.  NEUROLOGIC: Non focal, motor and sensory intact, speech clear  PSYCHIATRIC: Oriented X 3, without confusion or delirium, behavior appropriate, affect normal    Diagnostics Reviewed:  EKG reviewed personally.  There is some T wave inversion laterally.  No ST elevations.    Chest x-ray is overall clear.    Labs are stable.  "

## 2022-10-11 NOTE — PLAN OF CARE
Problem: Infection  Goal: Absence of Infection Signs and Symptoms  Outcome: Progressing   Goal Outcome Evaluation: Patient A&O, denies pain. Lungs diminished, occasional nonproductive cough. Feels his shortness of breath is greatly improved. On room air, CPAP overnight. Trace edema to BLEs. Tele a fib SVR in 40s asymptomatic, BBB, long QTc. Echo and cardiology consult today

## 2022-10-11 NOTE — UTILIZATION REVIEW
"Admission Status; Secondary Review Determination     Under the authority of the Utilization Management Committee, the utilization review process indicated a secondary review on Samantha Ramos.  The review outcome is based on review of the medical records, discussions with staff, and applying clinical experience noted on the date of the review.     (x) Observation Status Appropriate - This patient does not meet hospital inpatient criteria and is placed in observation status. If this patient's primary payer is Medicare and was admitted as an inpatient, Condition Code 44 should be used and patient status changed to \"observation\".     RATIONALE FOR DETERMINATION   81 yr old male with nonischemic CM, Chronic Afib on Eliquis, Barretts, JONATHAN who presented with SOB X 1 week.  COVID+ with new T wave inversion.  No hypoxia, no COVID pneumonia, no pulmonary edema.  Troponin negative.  No indication for Remdesivir or Dexamethasone.  No IV medications.  Likely outpatient stress test.  If cardiology or ECHO indicates that in-house workup needed to be performed or if develops hypoxia or other complications from COVID then change to inpatient.    The severity of illness, intensity of service provided, expected LOS and risk for adverse outcome make the care appropriate for further observation; however, doesn't meet criteria for hospital inpatient admission. Dr Clark notified of this determination and agrees with downgrade of status.      The information on this document is developed by the utilization review team in order for the business office to ensure compliance.  This only denotes the appropriateness of proper admission status and does not reflect the quality of care rendered.         The definitions of Inpatient Status and Observation Status used in making the determination above are those provided in the CMS Coverage Manual, Chapter 1 and Chapter 6, section 70.4.      Sincerely,  Jana Brewster MD  Utilization " Review  Physician Advisor  [facility]

## 2022-10-12 VITALS
DIASTOLIC BLOOD PRESSURE: 68 MMHG | HEART RATE: 50 BPM | HEIGHT: 71 IN | TEMPERATURE: 97.8 F | WEIGHT: 239.2 LBS | OXYGEN SATURATION: 97 % | RESPIRATION RATE: 18 BRPM | BODY MASS INDEX: 33.49 KG/M2 | SYSTOLIC BLOOD PRESSURE: 134 MMHG

## 2022-10-12 LAB
HOLD SPECIMEN: NORMAL
MAGNESIUM SERPL-MCNC: 1.6 MG/DL (ref 1.8–2.6)

## 2022-10-12 PROCEDURE — 250N000013 HC RX MED GY IP 250 OP 250 PS 637: Performed by: INTERNAL MEDICINE

## 2022-10-12 PROCEDURE — 83735 ASSAY OF MAGNESIUM: CPT | Performed by: INTERNAL MEDICINE

## 2022-10-12 PROCEDURE — G0378 HOSPITAL OBSERVATION PER HR: HCPCS

## 2022-10-12 PROCEDURE — 250N000013 HC RX MED GY IP 250 OP 250 PS 637: Performed by: STUDENT IN AN ORGANIZED HEALTH CARE EDUCATION/TRAINING PROGRAM

## 2022-10-12 PROCEDURE — 99217 PR OBSERVATION CARE DISCHARGE: CPT | Mod: CS | Performed by: HOSPITALIST

## 2022-10-12 PROCEDURE — 99214 OFFICE O/P EST MOD 30 MIN: CPT | Performed by: INTERNAL MEDICINE

## 2022-10-12 PROCEDURE — 36415 COLL VENOUS BLD VENIPUNCTURE: CPT | Performed by: INTERNAL MEDICINE

## 2022-10-12 RX ORDER — PANTOPRAZOLE SODIUM 20 MG/1
20 TABLET, DELAYED RELEASE ORAL
Qty: 30 TABLET | Refills: 0 | Status: SHIPPED | OUTPATIENT
Start: 2022-10-12 | End: 2022-11-11

## 2022-10-12 RX ORDER — MAGNESIUM OXIDE 400 MG/1
400 TABLET ORAL EVERY 4 HOURS
Status: COMPLETED | OUTPATIENT
Start: 2022-10-12 | End: 2022-10-12

## 2022-10-12 RX ADMIN — MAGNESIUM OXIDE TAB 400 MG (241.3 MG ELEMENTAL MG) 400 MG: 400 (241.3 MG) TAB at 11:05

## 2022-10-12 RX ADMIN — PANTOPRAZOLE SODIUM 20 MG: 20 TABLET, DELAYED RELEASE ORAL at 06:15

## 2022-10-12 RX ADMIN — APIXABAN 5 MG: 5 TABLET, FILM COATED ORAL at 08:09

## 2022-10-12 RX ADMIN — MAGNESIUM OXIDE TAB 400 MG (241.3 MG ELEMENTAL MG) 400 MG: 400 (241.3 MG) TAB at 14:01

## 2022-10-12 RX ADMIN — SULFACETAMIDE SODIUM AND PREDNISOLONE SODIUM PHOSPHATE 4 DROP: 100; 2.3 SOLUTION/ DROPS OPHTHALMIC at 08:10

## 2022-10-12 ASSESSMENT — ACTIVITIES OF DAILY LIVING (ADL)
ADLS_ACUITY_SCORE: 24
ADLS_ACUITY_SCORE: 24
DEPENDENT_IADLS:: INDEPENDENT
ADLS_ACUITY_SCORE: 24

## 2022-10-12 NOTE — CONSULTS
Care Management Initial Consult    General Information  Assessment completed with: Patient,    Type of CM/SW Visit: Initial Assessment    Primary Care Provider verified and updated as needed:  (his PCP just passed away so he plans to establish new PCP at [facility] in [facility])   Readmission within the last 30 days: no previous admission in last 30 days         Advance Care Planning:  None at file. States his wife and daughter (she is a Nurse) should make medical decisions for him IF he were not able to make them for himself.      Communication Assessment  Patient's communication style: spoken language (English or Bilingual)    Hearing Difficulty or Deaf: yes   Wear Glasses or Blind: yes    Cognitive  Cognitive/Neuro/Behavioral: WDL                      Living Environment:   People in home: spouse     Current living Arrangements: house      Able to return to prior arrangements:  yes     Family/Social Support:  Care provided by: self  Provides care for: no one  Marital Status:   Wife, Children (adopted daughter who is a RN)          Description of Support System: Supportive, Involved         Current Resources:   Patient receiving home care services: No     Community Resources: None  Equipment currently used at home:  (hearing aides)  Supplies currently used at home: None    Employment/Financial:  Employment Status: retired        Financial Concerns: No concerns identified   Referral to Financial Worker: No     Lifestyle & Psychosocial Needs:  Social Determinants of Health     Tobacco Use: Medium Risk     Smoking Tobacco Use: Former     Smokeless Tobacco Use: Never     Passive Exposure: Not on file   Alcohol Use: Not on file   Financial Resource Strain: Not on file   Food Insecurity: Not on file   Transportation Needs: Not on file   Physical Activity: Not on file   Stress: Not on file   Social Connections: Not on file   Intimate Partner Violence: Not on file   Depression: Not on file   Housing Stability: Not on file        Functional Status:  Prior to admission patient needed assistance:   Dependent ADLs:: Independent  Dependent IADLs:: Independent     Mental Health Status:  Mental Health Status:  (denies)       Chemical Dependency Status:  Chemical Dependency Status:  (denies)           Values/Beliefs:  Spiritual, Cultural Beliefs, Jew Practices, Values that affect care: no               Additional Information:  Chart reviewed. Covid +. Downgraded from inpatient to observation status on 10/11 and needs MOON/observation notice.    CM reviewed MEJIA notice with patient via phone D/T Covid + status. Has [employer] Medicare Advantage Plan. Patient verbalized understanding. Original in chart and copy given to bedside RN to give to patient.     CM completed assessment. Lives with wife in private home. Both are independent with all cares, including driving. No services. Currently in process of establishing new PCP as he was just notified his PCP passed away unexpectedly. Has hearing aides otherwise no assistive devices.     He is hopeful to return home, without additional needs. Grandson is an EMS driver and able to assist at home if any needs should arise. Plans to drive him self home if able- car in ER parking lot.     Patient would like if MD could provide an update to his daughter, Margo who is a RN ([phone]) prior to his release-  will update MD.     4:06 PM  Daughter Margo returned call to  and states she has spoke to her dad again and in comparison to yesterday her comfort level has changed. She feels comfortable with patient discharging home today. Does NOT feel HC services are needed (as previously discussed possible HC RN with MD). She feels patient is safe to drive himself home. DARIANA sent text page to MD.  also updated bedside RN and Charge.    Can disregard HC referrals.     Care Management Discharge Note    Discharge Date: 10/13/2022       Discharge Disposition: Home with wife    Discharge Services: None- declined by  patient and daughter prior to discharge    Discharge DME: None    Discharge Transportation: family or friend will provide    Private pay costs discussed: None indicated.  Patient/family educated on Medicare website which has current facility and service quality ratings:  (None indicated at this time)    Education Provided on the Discharge Plan: Patient and daughter decline HC services. AVS per bedside RN.    Persons Notified of Discharge Plans: patient and daughter Margo   Patient/Family in Agreement with the Plan: yes    Handoff Referral Completed: CM coordinated with patient and daughter Margo per patient request. CM coordinated with bedside RN, Charge RN and MD.       Aviva Mcclain, RN

## 2022-10-12 NOTE — PLAN OF CARE
"PRIMARY DIAGNOSIS: \"GENERIC\" NURSING  OUTPATIENT/OBSERVATION GOALS TO BE MET BEFORE DISCHARGE:  ADLs back to baseline: Yes with some shortness of breath     Activity and level of assistance: Ambulating independently.     Pain status: Pain free.     Return to near baseline physical activity: Yes          Discharge Planner Nurse   Safe discharge environment identified: Yes  Barriers to discharge: Yes - final recommendations by cardiology       Entered by: Vanita Navarro RN 10/12/2022 12:56 AM  Please review provider order for any additional goals.   Nurse to notify provider when observation goals have been met and patient is ready for discharge.                        "

## 2022-10-12 NOTE — PLAN OF CARE
Problem: Infection  Goal: Absence of Infection Signs and Symptoms  Outcome: Adequate for Care Transition       Goal Outcome Evaluation:  At discharge, the patient was vitally stable, alert and oriented. We discussed the patient's discharge AVS, and the patient was escorted to the ED entrance. The patient expressed that he would drive himself home. The patient's daughter, Margo, was also notified by telephone.

## 2022-10-12 NOTE — PLAN OF CARE
"PRIMARY DIAGNOSIS: \"GENERIC\" NURSING  OUTPATIENT/OBSERVATION GOALS TO BE MET BEFORE DISCHARGE:  ADLs back to baseline: Yes with some shortness of breath    Activity and level of assistance: Ambulating independently.    Pain status: Pain free.    Return to near baseline physical activity: Yes     Discharge Planner Nurse   Safe discharge environment identified: Yes  Barriers to discharge: Yes - final recommendations by cardiology       Entered by: Vanita Navarro RN 10/12/2022 12:56 AM     Please review provider order for any additional goals.   Nurse to notify provider when observation goals have been met and patient is ready for discharge.Goal Outcome Evaluation:                        "

## 2022-10-12 NOTE — PROGRESS NOTES
1. I need to speak with Dr. Duncan about the possibility splenectomy and the need for vaccination, the need for pelvic imaging and to confirm chemotherapy plan. 2. We need the original pathology report from 10/18/04 from [facility] scanned into the chart 3. We need to know if the patient has had previous genetic testing (talk to me about Katy Maldonado) and then arrange genetic testing with either Yu Cool or Katy Maldonado Pt on home CPAP at night. Pt on room air during the day.     Gray Carr, RT

## 2022-10-12 NOTE — PROGRESS NOTES
"PRIMARY DIAGNOSIS: \"GENERIC\" NURSING  OUTPATIENT/OBSERVATION GOALS TO BE MET BEFORE DISCHARGE:  1. ADLs back to baseline: Yes, some SOB    2. Activity and level of assistance: Independent    3. Pain status:Denies pain    Return to near baseline physical activity: Yes     Discharge Planner Nurse   Safe discharge environment identified: Yes  Barriers to discharge:Yes- waiting on cardiology       Entered by: Mart Harmon RN 10/12/2022 11:57 AM     Please review provider order for any additional goals.   Nurse to notify provider when observation goals have been met and patient is ready for discharge.    Patient A&Ox4. Independent in room. Patient at 95% on RA, states he is feeling better and anxious to go home. Able to make needs known. COVID isolation precautions. Call light within reach. Pt should be able to discharge today. Car is in ER parking lot.  "

## 2022-10-12 NOTE — PROGRESS NOTES
[facility]    Medicine Progress Note - Hospitalist Service    Date of Admission:  10/10/2022    Assessment & Plan        1.  COVID 19 infection  2.  Nonischemic cardiomyopathy ejection fraction of 40%.  -BNP elevated around 190.  -EKG showed atrial fibrillation? Flutter with slow ventricular response. T wave inversions in V4-V6 not present a year ago.  -Chest x-ray without any signs of pulmonary edema or COVID pneumonitis  -Troponin negative.  -No reported symptoms of angina over the last few months.  -Baseline creatinine around 1 creatinine on presentation slightly higher.  -Received Lasix 20 mg IV in the emergency department.  Plan:  -Patient currently on room air saturating at 95%.  -Continue supportive care for COVID 19.  Patient not a candidate for remdesivir.  No indication for dexamethasone since he is not hypoxic  -Cardiology plans for nuclear stress test once COVID is resolved  -Discussed with patient daughter Margo on discharge planning she would like home health nurse if possible to check on patient once discharged to home.  3.  Bradycardia.  Stable.  Metoprolol on hold.  Monitor on telemetry  4.  A. fib.  Stable.  Metoprolol hold as stated above.  Continue with Eliquis.  5.  Obstructive sleep apnea.  Chronic.  Continue CPAP  6.  Faust's esophagus.  Stable.  Continue pantoprazole    Diet: Combination Diet Low Saturated Fat Na <2400mg Diet, No Caffeine Diet  Room Service    DVT Prophylaxis: DOAC  Mora Catheter: Not present  Central Lines: None  Cardiac Monitoring: ACTIVE order. Indication: Bradycardias (48 hours)  Code Status: Full Code    The patient's care was discussed with the Bedside Nurse, Care Coordinator/ and Patient's Family.    Neftaly Ahmadi MD  Hospitalist Service  [facility]  Securely message with the Vocera Web Console (learn more here)  Text page via [employer] Paging/Directory    ______________________________________________________________________    Interval History   Patient resting in bed comfortably.  Reports he is doing well.  Reports no new complaints at this time.  No new events overnight per RN.  Review of system: All other systems are reviewed and found to be negative except as stated above in the interval history.    Data reviewed today: I reviewed all medications, new labs and imaging results over the last 24 hours. I personally reviewed    Physical Exam   Vital Signs: Temp: 97.8  F (36.6  C) Temp src: Oral BP: 139/60 Pulse: 51   Resp: 18 SpO2: 95 % O2 Device: None (Room air)    Weight: 239 lbs 3.2 oz  General, is a well grown well-developed adult male resting in bed comfortably no distress.  Lungs he has a normal respiratory effort on auscultation breath sounds are clear.  Heart he has a good S1 and S2 no obvious murmurs peripheral pulses are palpable and symmetric.  Abdomen is soft nontender nondistended bowel sounds are normoactive.  Musculoskeletal he has good muscle tone, no obvious lower extremity edema nails and digits   appear acyanotic  Skin on inspection no obvious rashes noted is warm to touch.    Data   Recent Labs   Lab 10/11/22  0822 10/10/22  2157 10/10/22  1215   WBC 3.9*  --  3.9*   HGB 14.6  --  14.0   MCV 99  --  99   *  --  120*     --  136   POTASSIUM 3.8  --  4.4   CHLORIDE 104  --  103   CO2 23  --  21*   BUN 25  --  22   CR 1.31*  --  1.26   ANIONGAP 13  --  12   KAREEM 9.2  --  9.1     --  104   ALBUMIN 3.8 3.6  --    PROTTOTAL 6.8 6.2  --    BILITOTAL 0.9 0.8  --    ALKPHOS 107 108  --    * 114*  --    AST 68* 79*  --      No results found for this or any previous visit (from the past 24 hour(s)).  Medications     - MEDICATION INSTRUCTIONS -         apixaban ANTICOAGULANT  5 mg Oral BID     influenza vac high-dose quad  0.7 mL Intramuscular Prior to discharge     [Held by provider] metoprolol succinate ER  25 mg Oral At Bedtime      pantoprazole  20 mg Oral QAM AC     sodium chloride (PF)  3 mL Intracatheter Q8H     sulfacetamide-prednisoLONE  4-6 drop Right Ear BID

## 2022-10-12 NOTE — PROGRESS NOTES
"PRIMARY DIAGNOSIS: \"GENERIC\" NURSING  OUTPATIENT/OBSERVATION GOALS TO BE MET BEFORE DISCHARGE:  1. ADLs back to baseline: Yes, some SOB    2. Activity and level of assistance: Independent    3. Pain status:Denies pain    Return to near baseline physical activity: Yes     Discharge Planner Nurse   Safe discharge environment identified: Yes  Barriers to discharge:Yes- waiting on cardiology       Entered by: Mart Harmon RN 10/12/2022 8:53 AM     Please review provider order for any additional goals.   Nurse to notify provider when observation goals have been met and patient is ready for discharge.    Patient A&Ox4. Independent in room. Patient at 96% on RA, states he is feeling better and anxious to go home. Able to make needs known. COVID isolation precautions. Call light within reach. Pending discharge home.  "

## 2022-10-12 NOTE — PROGRESS NOTES
Hospital bed  that patient daughter spoke to the patient and she is comfortable patient being discharged to home.  She noted patient may not require any home health services.  Spoke to patient daughter and informed her there would need to follow-up with cardiology for nuclear stress test.  She is okay patient discharging to home.  We will go ahead and discharge patient.

## 2022-10-12 NOTE — PROGRESS NOTES
[facility]    Medicine Progress Note - Hospitalist Service    Date of Admission:  10/10/2022    Assessment & Plan        1.  COVID 19 infection  2.  Nonischemic cardiomyopathy ejection fraction of 40%.  -BNP elevated around 190.  -EKG showed atrial fibrillation? Flutter with slow ventricular response. T wave inversions in V4-V6 not present a year ago.  -Chest x-ray without any signs of pulmonary edema or COVID pneumonitis  -Troponin negative.  -No reported symptoms of angina over the last few months.  -Baseline creatinine around 1 creatinine on presentation slightly higher.  -Received Lasix 20 mg IV in the emergency department.  Plan:  -Patient currently on room air saturating at 95%.  -Continue supportive care for COVID 19.  Patient not a candidate for remdesivir.  No indication for dexamethasone since he is not hypoxic  -Cardiology plans for nuclear stress test once COVID is resolved  -Discussed with patient daughter Margo on discharge planning she would like home health nurse if possible to check on patient once discharged to home.  3.  Bradycardia.  Stable.  Metoprolol on hold.  Monitor on telemetry  4.  A. fib.  Stable.  Metoprolol hold as stated above.  Continue with Eliquis.  5.  Obstructive sleep apnea.  Chronic.  Continue CPAP  6.  Faust's esophagus.  Stable.  Continue pantoprazole    Diet: Combination Diet Low Saturated Fat Na <2400mg Diet, No Caffeine Diet  Room Service    DVT Prophylaxis: DOAC  Mora Catheter: Not present  Central Lines: None  Cardiac Monitoring: ACTIVE order. Indication: Bradycardias (48 hours)  Code Status: Full Code    The patient's care was discussed with the Bedside Nurse, Care Coordinator/ and Patient's Family.    Neftaly Ahmadi MD  Hospitalist Service  [facility]  Securely message with the Vocera Web Console (learn more here)  Text page via [employer] Paging/Directory    ______________________________________________________________________    Interval History   Patient resting in bed comfortably.  Reports he is doing well.  Reports no new complaints at this time.  No new events overnight per RN.  Review of system: All other systems are reviewed and found to be negative except as stated above in the interval history.    Data reviewed today: I reviewed all medications, new labs and imaging results over the last 24 hours. I personally reviewed    Physical Exam   Vital Signs: Temp: 97.8  F (36.6  C) Temp src: Oral BP: 139/60 Pulse: 51   Resp: 18 SpO2: 95 % O2 Device: None (Room air)    Weight: 239 lbs 3.2 oz  General, is a well grown well-developed adult male resting in bed comfortably no distress.  Lungs he has a normal respiratory effort on auscultation breath sounds are clear.  Heart he has a good S1 and S2 no obvious murmurs peripheral pulses are palpable and symmetric.  Abdomen is soft nontender nondistended bowel sounds are normoactive.  Musculoskeletal he has good muscle tone, no obvious lower extremity edema nails and digits   appear acyanotic  Skin on inspection no obvious rashes noted is warm to touch.    Data   Recent Labs   Lab 10/11/22  0822 10/10/22  2157 10/10/22  1215   WBC 3.9*  --  3.9*   HGB 14.6  --  14.0   MCV 99  --  99   *  --  120*     --  136   POTASSIUM 3.8  --  4.4   CHLORIDE 104  --  103   CO2 23  --  21*   BUN 25  --  22   CR 1.31*  --  1.26   ANIONGAP 13  --  12   KAREEM 9.2  --  9.1     --  104   ALBUMIN 3.8 3.6  --    PROTTOTAL 6.8 6.2  --    BILITOTAL 0.9 0.8  --    ALKPHOS 107 108  --    * 114*  --    AST 68* 79*  --      No results found for this or any previous visit (from the past 24 hour(s)).  Medications     - MEDICATION INSTRUCTIONS -         apixaban ANTICOAGULANT  5 mg Oral BID     influenza vac high-dose quad  0.7 mL Intramuscular Prior to discharge     [Held by provider] metoprolol succinate ER  25 mg Oral At Bedtime      pantoprazole  20 mg Oral QAM AC     sodium chloride (PF)  3 mL Intracatheter Q8H     sulfacetamide-prednisoLONE  4-6 drop Right Ear BID

## 2022-10-12 NOTE — PROGRESS NOTES
"PRIMARY DIAGNOSIS: \"GENERIC\" NURSING  OUTPATIENT/OBSERVATION GOALS TO BE MET BEFORE DISCHARGE:  1. ADLs back to baseline: Yes    2. Activity and level of assistance: Ambulating independently.    3. Pain status: Pain free.    4. Return to near baseline physical activity: Yes     Discharge Planner Nurse   Safe discharge environment identified: Yes  Barriers to discharge: Yes; awaiting medical clearance.        Entered by: Eleonora Goel RN 10/11/2022 9:21 PM     Please review provider order for any additional goals.   Nurse to notify provider when observation goals have been met and patient is ready for discharge.    Pt alert and oriented x 4. VSS; afebrile. Lung sounds diminished. Reports ongoing SOB. On RA; sat in mid 90s. CPAP don @ HS. Special precautions maintained. Denies chest pain and N/V. Tele- A fib/A flutter jorge. Asymptomatic. Pt has baseline bradycardia (40s-50s). MD aware, continue to monitor.   "

## 2022-10-12 NOTE — DISCHARGE SUMMARY
[facility]  Hospitalist Discharge Summary      Date of Admission:  10/10/2022  Date of Discharge:  10/12/2022  Discharging Provider: Neftaly Ahmadi MD  Discharge Service: Hospitalist Service    Discharge Diagnoses   1.  COVID 19 infection  2.  Nonischemic cardiomyopathy ejection fraction of 40%.  -BNP elevated around 190.  -EKG showed atrial fibrillation? Flutter with slow ventricular response. T wave inversions in V4-V6 not present a year ago.  -Chest x-ray without any signs of pulmonary edema or COVID pneumonitis  -Troponin negative.  -No reported symptoms of angina over the last few months.  -Baseline creatinine around 1 creatinine on presentation slightly higher.  -Received Lasix 20 mg IV in the emergency department.  Plan:  -Patient currently on room air saturating at 95%.  -Continue supportive care for COVID 19.  Patient not a candidate for remdesivir.  No indication for dexamethasone since he is not hypoxic  -Cardiology plans for nuclear stress test once COVID is resolved  -Discussed with patient daughter Margo she is okay with patient discharging to home.  I advised her to make sure patient follows up with cardiology outpatient and she was agreeable  3.  Bradycardia.  Stable.  Metoprolol on hold due to bradycardia at this time.  Follow-up with cardiology outpatient and also primary care doctor.  4.  A. fib.  Stable.  Metoprolol on hold as stated above.  Continue with Eliquis.  5.  Obstructive sleep apnea.  Chronic.  Continue CPAP  6.  Faust's esophagus.  Stable.  Continue pantoprazole    Follow-ups Needed After Discharge   Follow-up Appointments     Follow-up and recommended labs and tests       Follow up with primary care provider, [facility], within 7 days for hospital follow- up.  The following labs/tests   are recommended: CBC and BMP.    Follow up with cardiology for nuclear stress test             Unresulted Labs Ordered in the Past 30 Days of  this Admission     No orders found from 9/10/2022 to 10/11/2022.        Discharge Disposition   Discharged to home  Condition at discharge: Stable    Hospital Course   The patient is a 81 year old with past medical history significant for nonischemic cardiomyopathy, chronic A. fib on Eliquis, Faust's esophagus, sleep apnea on CPAP who presented to the hospital on 10/10/2022 with shortness of breath and cough for 1 week.  Was found to be COVID-positive.  EKG shows new T wave inversion lateral leads.  Cardiology was consulted and they plan for nuclear stress test outpatient.  We will continue with supportive care for COVID infection.  On his stay he was noted to be bradycardic.  Metoprolol has been on hold.  We will continue with Eliquis for anticoagulation.  He has progressed well.  Today he states he feels much better and is ready to be discharged.  I discussed with his daughter Margo who also is agreeable that patient can discharge.  Advised he should follow-up with primary care doctor at least in 3 to 5 days.  Follow-up with cardiology as well for nuclear stress test outpatient.  He should return to hospital or go the nearest emergency room in case of any acute changes.  Both the patient and daughter voiced understanding and was agreeable to this discharge planning.  Discharge condition is good.    Consultations This Hospital Stay   CARDIOLOGY IP CONSULT  CARE MANAGEMENT / SOCIAL WORK IP CONSULT    Code Status   Full Code    Time Spent on this Encounter   INeftaly MD, personally saw the patient today and spent greater than 30 minutes discharging this patient.       Neftaly Ahmadi MD  [address] [address]  Phone: [phone]  Fax: [phone]  ______________________________________________________________________    Physical Exam   Vital Signs: Temp: 97.8  F (36.6  C) Temp src: Oral BP: 139/60 Pulse: 51   Resp: 18 SpO2: 95 % O2  Device: None (Room air)    Weight: 239 lbs 3.2 oz  General, is a well grown well-developed adult male resting in bed comfortably no distress.  Lungs he has a normal respiratory effort on auscultation breath sounds are clear.  Heart he has a good S1 and S2 no obvious murmurs peripheral pulses are palpable and symmetric.  Abdomen is soft nontender nondistended bowel sounds are normoactive.  Musculoskeletal he has good muscle tone, no obvious lower extremity edema nails and digits   appear acyanotic  Skin on inspection no obvious rashes noted is warm to touch       Primary Care Physician   [facility]    Discharge Orders      Reason for your hospital stay    Patient admitted from COVID-19     Follow-up and recommended labs and tests     Follow up with primary care provider, [facility], within 7 days for hospital follow- up.  The following labs/tests are recommended: CBC and BMP.    Follow up with cardiology for nuclear stress test     Activity    Your activity upon discharge: activity as tolerated     Diet    Follow this diet upon discharge: Orders Placed This Encounter      Room Service      Combination Diet Low Saturated Fat Na <2400mg Diet, No Caffeine Diet       Significant Results and Procedures   Most Recent 3 CBC's:Recent Labs   Lab Test 10/11/22  0822 10/10/22  1215 11/06/21  0821   WBC 3.9* 3.9* 5.9   HGB 14.6 14.0 14.2   MCV 99 99 97   * 120* 167     Most Recent 3 BMP's:Recent Labs   Lab Test 10/11/22  0822 10/10/22  1215 11/06/21  0821    136 139   POTASSIUM 3.8 4.4 4.2   CHLORIDE 104 103 106   CO2 23 21* 21*   BUN 25 22 15   CR 1.31* 1.26 0.98   ANIONGAP 13 12 12   KAREEM 9.2 9.1 9.2    104 126*       Discharge Medications   Current Discharge Medication List      START taking these medications    Details   pantoprazole (PROTONIX) 20 MG EC tablet Take 1 tablet (20 mg) by mouth every morning (before breakfast) for 30 days  Qty: 30  tablet, Refills: 0    Associated Diagnoses: Gastroesophageal reflux disease without esophagitis         CONTINUE these medications which have NOT CHANGED    Details   apixaban (ELIQUIS) 5 mg Tab tablet [APIXABAN (ELIQUIS) 5 MG TAB TABLET] Take 1 tablet (5 mg total) by mouth 2 (two) times a day.  Qty: 180 tablet, Refills: 0    Associated Diagnoses: Atrial fibrillation with rapid ventricular response (H)      cholecalciferol, vitamin D3, 1,000 unit tablet [CHOLECALCIFEROL, VITAMIN D3, 1,000 UNIT TABLET] Take 2,000 Units by mouth daily.       furosemide (LASIX) 20 MG tablet [FUROSEMIDE (LASIX) 20 MG TABLET] Take 20 mg by mouth 2 (two) times a day.      latanoprost (XALATAN) 0.005 % ophthalmic solution [LATANOPROST (XALATAN) 0.005 % OPHTHALMIC SOLUTION] Administer 1 drop to both eyes at bedtime.      meclizine (ANTIVERT) 25 MG tablet Take 1 tablet (25 mg) by mouth 3 times daily as needed for dizziness or nausea  Qty: 21 tablet, Refills: 0      omeprazole (PRILOSEC) 10 MG capsule Take 1 capsule (10 mg) by mouth daily      sulfacetamide-prednisoLONE (VASOCIDIN) 10-0.23 % ophthalmic solution Place 4-6 drops into the right ear 2 times daily X 14 days      tetrahydrozoline-Zn sulfate (RELIEF DROPS) 0.05-0.25 % ophthalmic solution Place 1 drop into both eyes every morning      timolol maleate (TIMOPTIC) 0.5 % ophthalmic solution Place 1 drop into the right eye every morning         STOP taking these medications       metoprolol succinate (TOPROL-XL) 25 MG Comments:   Reason for Stopping:             Allergies   Allergies   Allergen Reactions     Indocin [Indomethacin] Itching

## 2022-10-12 NOTE — PROGRESS NOTES
Assessment/Plan:  1.  Abnormal ECG: The patient's ECG are reviewed.  He has T wave inversion in inferior and anterior leads when compared to previous ECG in 2021.    Echocardiogram reported normal left ventricular size, wall motion and systolic function.  No segmental wall motion abnormality.  Consider nuclear stress test when his COVID is resolved.     2.  Nonischemic cardiomyopathy, LVEF of 40%, chronic systolic congestive heart failure: The patient has mild shortness of breath which could be related to COVID infection.  His BNP is not significantly elevated.  Chest x-ray did not indicate pulmonary congestion.  His electrolytes are stable.  Creatinine is slightly high 1.31 today.  Echocardiogram reported improved left ventricular ejection fraction from 40 to 60%.  Restart Lasix 20 mg daily when his creatinine is improved to baseline.     3.  Chronic atrial fibrillation with slow ventricular response: Metoprolol succinate 25 mg daily is held.  Monitor his ventricular rate.  Continue Eliquis for chronic anticoagulation.     4.  COVID infection: Please see primary team management.     Discussed the plan with his nurse Lawanda.    Subjective:  Interval History:  Has no complaints of chest pain. Mild shortness of breath.     Current Medications:  Scheduled Meds:    apixaban ANTICOAGULANT  5 mg Oral BID     influenza vac high-dose quad  0.7 mL Intramuscular Prior to discharge     magnesium oxide  400 mg Oral Q4H     [Held by provider] metoprolol succinate ER  25 mg Oral At Bedtime     pantoprazole  20 mg Oral QAM AC     sodium chloride (PF)  3 mL Intracatheter Q8H     sulfacetamide-prednisoLONE  4-6 drop Right Ear BID     Continuous Infusions:    - MEDICATION INSTRUCTIONS -       PRN Meds:.lidocaine 4%, lidocaine (buffered or not buffered), melatonin, - MEDICATION INSTRUCTIONS -, sodium chloride (PF)    Objective:   Vital signs in last 24 hours:  Temp:  [97.6  F (36.4  C)-98.6  F (37  C)] 97.8  F (36.6  C)  Pulse:   [45-52] 51  Resp:  [16-18] 18  BP: (124-147)/(57-69) 139/60  SpO2:  [93 %-96 %] 95 %  Weight:   [unfilled]     Physical Exam:  Physical exam is not performed due to COVID isolation.  Please see primary team management for details.    Cardiographics:   Report: personally reviewed. .      Echocardiogram 10-:  The left ventricle is normal in size with mild concentric left ventricular  hypertrophy.  Left ventricular function is normal.The ejection fraction is 60-65%.  Normal right ventricle size and systolic function.  There is moderate valvular aortic stenosis with mild to moderate (1-2+) aortic  regurgitation.  There is no comparison study available.      Lab Results:   personally reviewed.     Lab Results   Component Value Date     10/11/2022    CO2 23 10/11/2022    BUN 25 10/11/2022     Lab Results   Component Value Date    TROPONINI 0.03 10/10/2022     Lab Results   Component Value Date    WBC 3.9 10/11/2022    HGB 14.6 10/11/2022    HCT 43.4 10/11/2022    MCV 99 10/11/2022     10/11/2022     Lab Results   Component Value Date    CHOL 179 02/16/2016    TRIG 85 02/16/2016    HDL 66 02/16/2016    LDL 96 02/16/2016

## 2022-10-13 ENCOUNTER — PATIENT OUTREACH (OUTPATIENT)
Dept: CARE COORDINATION | Facility: CLINIC | Age: 82
End: 2022-10-13

## 2022-10-13 NOTE — PROGRESS NOTES
Clinic Care Coordination Contact  [facility]: Post-Discharge Note  SITUATION                                                      Admission:    Admission Date: 10/10/22   Reason for Admission: 1.  COVID 19 infection  2.  Nonischemic cardiomyopathy ejection fraction of 40%.  -BNP elevated around 190.  -EKG showed atrial fibrillation? Flutter with slow ventricular response. T wave inversions in V4-V6 not present a year ago.  -Chest x-ray without any signs of pulmonary edema or COVID pneumonitis  -Troponin negative.  -No reported symptoms of angina over the last few months.  -Baseline creatinine around 1 creatinine on presentation slightly higher.  -Received Lasix 20 mg IV in the emergency department.  Plan:  -Patient currently on room air saturating at 95%.  -Continue supportive care for COVID 19.  Patient not a candidate for remdesivir.  No indication for dexamethasone since he is not hypoxic  -Cardiology plans for nuclear stress test once COVID is resolved  -Discussed with patient daughter Margo she is okay with patient discharging to home.  I advised her to make sure patient follows up with cardiology outpatient and she was agreeable  3.  Bradycardia.  Stable.  Metoprolol on hold due to bradycardia at this time.  Follow-up with cardiology outpatient and also primary care doctor.  4.  A. fib.  Stable.  Metoprolol on hold as stated above.  Continue with Eliquis.  5.  Obstructive sleep apnea.  Chronic.  Continue CPAP  6.  Faust's esophagus.  Stable.  Continue pantoprazole  Discharge:   Discharge Date: 10/12/22  Discharge Diagnosis: 1.  COVID 19 infection  2.  Nonischemic cardiomyopathy ejection fraction of 40%.  -BNP elevated around 190.  -EKG showed atrial fibrillation? Flutter with slow ventricular response. T wave inversions in V4-V6 not present a year ago.  -Chest x-ray without any signs of pulmonary edema or COVID pneumonitis  -Troponin negative.  -No reported symptoms of angina over the last few months.   -Baseline creatinine around 1 creatinine on presentation slightly higher.  -Received Lasix 20 mg IV in the emergency department.  Plan:  -Patient currently on room air saturating at 95%.  -Continue supportive care for COVID 19.  Patient not a candidate for remdesivir.  No indication for dexamethasone since he is not hypoxic  -Cardiology plans for nuclear stress test once COVID is resolved  -Discussed with patient daughter Margo she is okay with patient discharging to home.  I advised her to make sure patient follows up with cardiology outpatient and she was agreeable  3.  Bradycardia.  Stable.  Metoprolol on hold due to bradycardia at this time.  Follow-up with cardiology outpatient and also primary care doctor.  4.  A. fib.  Stable.  Metoprolol on hold as stated above.  Continue with Eliquis.  5.  Obstructive sleep apnea.  Chronic.  Continue CPAP  6.  Faust's esophagus.  Stable.  Continue pantoprazole    BACKGROUND                                                      Per hospital discharge summary and inpatient provider notes:    The patient is a 81 year old with past medical history significant for nonischemic cardiomyopathy, chronic A. fib on Eliquis, Faust's esophagus, sleep apnea on CPAP who presented to the hospital on 10/10/2022 with shortness of breath and cough for 1 week.  Was found to be COVID-positive.     ASSESSMENT           Discharge Assessment  How are you doing now that you are home?: doing well  How are your symptoms? (Red Flag symptoms escalate to triage hotline per guidelines): Improved  Do you feel your condition is stable enough to be safe at home until your provider visit?: Yes  Does the patient have their discharge instructions? : Yes  Does the patient have questions regarding their discharge instructions? : No  Were you started on any new medications or were there changes to any of your previous medications? : Yes  Does the patient have all of their medications?: Yes  Do you have  questions regarding any of your medications? : No  Do you have all of your needed medical supplies or equipment (DME)?  (i.e. oxygen tank, CPAP, cane, etc.): Yes    Post-op (KENYA CTA Only)  If the patient had a surgery or procedure, do they have any questions for a nurse?: No           PLAN                                                      Outpatient Plan:      No future appointments.    Follow-ups Needed After Discharge     Follow-up Appointments     Follow-up and recommended labs and tests       Follow up with primary care provider, [facility], within 7 days for hospital follow- up.  The following labs/tests   are recommended: CBC and BMP.    Follow up with cardiology for nuclear stress test          For any urgent concerns, please contact our 24 hour nurse triage line: [phone] ([phone])       KENYA Ayers  [phone]  [facility]

## 2023-02-05 ENCOUNTER — HEALTH MAINTENANCE LETTER (OUTPATIENT)
Age: 83
End: 2023-02-05

## 2023-12-31 ENCOUNTER — APPOINTMENT (OUTPATIENT)
Dept: RADIOLOGY | Facility: CLINIC | Age: 83
End: 2023-12-31
Attending: STUDENT IN AN ORGANIZED HEALTH CARE EDUCATION/TRAINING PROGRAM
Payer: COMMERCIAL

## 2023-12-31 ENCOUNTER — HOSPITAL ENCOUNTER (EMERGENCY)
Facility: CLINIC | Age: 83
Discharge: HOME | End: 2023-12-31
Attending: STUDENT IN AN ORGANIZED HEALTH CARE EDUCATION/TRAINING PROGRAM | Admitting: STUDENT IN AN ORGANIZED HEALTH CARE EDUCATION/TRAINING PROGRAM
Payer: COMMERCIAL

## 2023-12-31 ENCOUNTER — NURSE TRIAGE (OUTPATIENT)
Dept: NURSING | Facility: CLINIC | Age: 83
End: 2023-12-31
Payer: MEDICARE

## 2023-12-31 VITALS
TEMPERATURE: 99 F | SYSTOLIC BLOOD PRESSURE: 143 MMHG | HEIGHT: 73 IN | OXYGEN SATURATION: 97 % | BODY MASS INDEX: 30.88 KG/M2 | WEIGHT: 233 LBS | HEART RATE: 66 BPM | RESPIRATION RATE: 18 BRPM | DIASTOLIC BLOOD PRESSURE: 60 MMHG

## 2023-12-31 DIAGNOSIS — U07.1 COVID-19: ICD-10-CM

## 2023-12-31 LAB
ALBUMIN SERPL BCG-MCNC: 4.1 G/DL (ref 3.5–5.2)
ALP SERPL-CCNC: 107 U/L (ref 40–150)
ALT SERPL W P-5'-P-CCNC: 13 U/L (ref 0–70)
ANION GAP SERPL CALCULATED.3IONS-SCNC: 9 MMOL/L (ref 7–15)
AST SERPL W P-5'-P-CCNC: 20 U/L (ref 0–45)
ATRIAL RATE - MUSE: 441 BPM
BASOPHILS # BLD AUTO: 0 10E3/UL (ref 0–0.2)
BASOPHILS NFR BLD AUTO: 0 %
BILIRUB SERPL-MCNC: 1.3 MG/DL
BUN SERPL-MCNC: 14.1 MG/DL (ref 8–23)
CALCIUM SERPL-MCNC: 9.5 MG/DL (ref 8.8–10.2)
CHLORIDE SERPL-SCNC: 102 MMOL/L (ref 98–107)
CREAT SERPL-MCNC: 1.16 MG/DL (ref 0.67–1.17)
DEPRECATED HCO3 PLAS-SCNC: 29 MMOL/L (ref 22–29)
DIASTOLIC BLOOD PRESSURE - MUSE: NORMAL MMHG
EGFRCR SERPLBLD CKD-EPI 2021: 63 ML/MIN/1.73M2
EOSINOPHIL # BLD AUTO: 0.1 10E3/UL (ref 0–0.7)
EOSINOPHIL NFR BLD AUTO: 1 %
ERYTHROCYTE [DISTWIDTH] IN BLOOD BY AUTOMATED COUNT: 13.3 % (ref 10–15)
FLUAV RNA SPEC QL NAA+PROBE: NEGATIVE
FLUBV RNA RESP QL NAA+PROBE: NEGATIVE
GLUCOSE SERPL-MCNC: 111 MG/DL (ref 70–99)
HCT VFR BLD AUTO: 42.6 % (ref 40–53)
HGB BLD-MCNC: 14.2 G/DL (ref 13.3–17.7)
IMM GRANULOCYTES # BLD: 0 10E3/UL
IMM GRANULOCYTES NFR BLD: 1 %
INTERPRETATION ECG - MUSE: NORMAL
LYMPHOCYTES # BLD AUTO: 0.9 10E3/UL (ref 0.8–5.3)
LYMPHOCYTES NFR BLD AUTO: 11 %
MCH RBC QN AUTO: 32.6 PG (ref 26.5–33)
MCHC RBC AUTO-ENTMCNC: 33.3 G/DL (ref 31.5–36.5)
MCV RBC AUTO: 98 FL (ref 78–100)
MONOCYTES # BLD AUTO: 0.8 10E3/UL (ref 0–1.3)
MONOCYTES NFR BLD AUTO: 11 %
NEUTROPHILS # BLD AUTO: 6.1 10E3/UL (ref 1.6–8.3)
NEUTROPHILS NFR BLD AUTO: 76 %
NRBC # BLD AUTO: 0 10E3/UL
NRBC BLD AUTO-RTO: 0 /100
P AXIS - MUSE: NORMAL DEGREES
PLATELET # BLD AUTO: 143 10E3/UL (ref 150–450)
POTASSIUM SERPL-SCNC: 4.4 MMOL/L (ref 3.4–5.3)
PR INTERVAL - MUSE: NORMAL MS
PROT SERPL-MCNC: 6.7 G/DL (ref 6.4–8.3)
QRS DURATION - MUSE: 162 MS
QT - MUSE: 466 MS
QTC - MUSE: 466 MS
R AXIS - MUSE: -57 DEGREES
RBC # BLD AUTO: 4.35 10E6/UL (ref 4.4–5.9)
RSV RNA SPEC NAA+PROBE: NEGATIVE
SARS-COV-2 RNA RESP QL NAA+PROBE: POSITIVE
SODIUM SERPL-SCNC: 140 MMOL/L (ref 135–145)
SYSTOLIC BLOOD PRESSURE - MUSE: NORMAL MMHG
T AXIS - MUSE: 113 DEGREES
VENTRICULAR RATE- MUSE: 60 BPM
WBC # BLD AUTO: 7.9 10E3/UL (ref 4–11)

## 2023-12-31 PROCEDURE — 99285 EMERGENCY DEPT VISIT HI MDM: CPT | Mod: 25

## 2023-12-31 PROCEDURE — 82040 ASSAY OF SERUM ALBUMIN: CPT | Performed by: STUDENT IN AN ORGANIZED HEALTH CARE EDUCATION/TRAINING PROGRAM

## 2023-12-31 PROCEDURE — 93005 ELECTROCARDIOGRAM TRACING: CPT | Performed by: STUDENT IN AN ORGANIZED HEALTH CARE EDUCATION/TRAINING PROGRAM

## 2023-12-31 PROCEDURE — 36415 COLL VENOUS BLD VENIPUNCTURE: CPT | Performed by: STUDENT IN AN ORGANIZED HEALTH CARE EDUCATION/TRAINING PROGRAM

## 2023-12-31 PROCEDURE — 71046 X-RAY EXAM CHEST 2 VIEWS: CPT

## 2023-12-31 PROCEDURE — 85025 COMPLETE CBC W/AUTO DIFF WBC: CPT | Performed by: STUDENT IN AN ORGANIZED HEALTH CARE EDUCATION/TRAINING PROGRAM

## 2023-12-31 PROCEDURE — 87637 SARSCOV2&INF A&B&RSV AMP PRB: CPT | Performed by: STUDENT IN AN ORGANIZED HEALTH CARE EDUCATION/TRAINING PROGRAM

## 2023-12-31 ASSESSMENT — ENCOUNTER SYMPTOMS
LIGHT-HEADEDNESS: 0
COUGH: 1
CHEST TIGHTNESS: 1
SHORTNESS OF BREATH: 0

## 2023-12-31 NOTE — DISCHARGE INSTRUCTIONS
Your covid test is positive here in the ER.  Your chest xray and the rest of your blood work is all normal.  Please follow up with your primary doctor if you dont start to feel better this week.

## 2023-12-31 NOTE — TELEPHONE ENCOUNTER
Nurse Triage SBAR    Is this a 2nd Level Triage? NO    Situation: Pt calling re: Covid symptoms.    Background: Tested positive for Covid last night and feeling increasingly short of breath this morning. Wondering if he needs to go in to the hospital. Pt has a pacemaker.     Assessment: Pt reports increased SOB (has some SOB at baseline with COPD), chest pain/pressure, nasal congestion and cough. Pt is speaking clearly in complete sentences and not pausing for need to catch his breath.     Protocol Recommended Disposition:   Go to ED Now    Recommendation: Would recommend being evaluated in the ED now. Says his wife can drive him. Reviewed care advice and location of closest ED. Pt will get dressed and go now.     Lawanda Gao, RN, BSN  [facility]   Triage Nurse Advisor     Reason for Disposition   SEVERE or constant chest pain or pressure  (Exception: Mild central chest pain, present only when coughing.)    Additional Information   Negative: SEVERE difficulty breathing (e.g., struggling for each breath, speaks in single words)   Negative: Difficult to awaken or acting confused (e.g., disoriented, slurred speech)   Negative: Bluish (or gray) lips or face now   Negative: Shock suspected (e.g., cold/pale/clammy skin, too weak to stand, low BP, rapid pulse)   Negative: Sounds like a life-threatening emergency to the triager   Negative: [1] Diagnosed or suspected COVID-19 AND [2] symptoms lasting 3 or more weeks   Negative: [1] COVID-19 exposure AND [2] no symptoms   Negative: COVID-19 vaccine reaction suspected (e.g., fever, headache, muscle aches) occurring 1 to 3 days after getting vaccine   Negative: COVID-19 vaccine, questions about   Negative: [1] Lives with someone known to have influenza (flu test positive) AND [2] flu-like symptoms (e.g., cough, runny nose, sore throat, SOB; with or without fever)   Negative: [1] Possible COVID-19 symptoms AND [2] triager concerned about severity of symptoms or other  causes   Negative: COVID-19 and breastfeeding, questions about    Protocols used: Coronavirus (COVID-19) Diagnosed or [id number]

## 2023-12-31 NOTE — ED TRIAGE NOTES
Patient had positive home COVID test yesterday. Symptoms developed Tuesday-began as sore throat, runny nose, fatigue. Shortness of breath x3 days. Wears Cpap at night .

## 2023-12-31 NOTE — ED PROVIDER NOTES
EMERGENCY DEPARTMENT ENCOUNTER      NAME: Samantha Ramos  AGE: 83 year old male  YOB: 1940  MRN: [id number]  EVALUATION DATE & TIME: No admission date for patient encounter.    PCP: Eric Franklin    ED PROVIDER: Brain Finney M.D.      Chief Complaint   Patient presents with    Covid Concern    Shortness of Breath         FINAL IMPRESSION:  1. COVID-19          ED COURSE & MEDICAL DECISION MAKING:    Pertinent Labs & Imaging studies reviewed. (See chart for details)  83 year old male presents to the Emergency Department for evaluation of COVID-like symptoms.  Patient has felt sick for at least 6 to 7 days.  Patient did not have any new shortness of breath.  He did not have any new dizziness.  No new chest pain.  He had a mild cough and in general has aches and pains and a mild headache and URI symptoms consistent with COVID.  Chest x-ray is normal here with no pneumonia.  Screening lab work shows no obvious electrolyte abnormality.  Patient is nontoxic-appearing and is blood work is otherwise reassuring.  He is not a candidate for Paxil and so while we considered Paxil or even admission for patient of this age with COVID-19 I felt ultimately his clinical appearance and the time course did not warrant this.  Patient will be discharged home with COVID-19 instructions and I will ask him to follow-up with his primary doctor return should things worsen.    11:08 AM I met with the patient, obtained history, performed an initial exam, and discussed options and plan for diagnostics and treatment here in the ED.   11:45 AM I updated the patient and discussed plan for discharge.  At the conclusion of the encounter I discussed the results of all of the tests and the disposition. The questions were answered. The patient or family acknowledged understanding and was agreeable with the care plan.              Medical Decision Making    History:  Supplemental history from: Documented in chart, if  applicable  External Record(s) reviewed: Documented in chart, if applicable.    Work Up:  Chart documentation includes differential considered and any EKGs or imaging independently interpreted by provider, where specified.  In additional to work up documented, I considered the following work up: Documented in chart, if applicable.    External consultation:  Discussion of management with another provider: Documented in chart, if applicable    Complicating factors:  Care impacted by chronic illness: Heart Disease and Hypertension  Care affected by social determinants of health: N/A    Disposition considerations: Discharge. I discussed a prescription for paxil, but deferred after shared decision making discussion.. I considered admission, but discharged the patient after share decision making conversation.        This patient involved a high degree of complexity in medical decision making, as significant risks were present and assessed. Recent encounters & results in medical record reviewed by me.     All workup (i.e. any EKG/labs/imaging as per charting below) reviewed and independently interpreted by me. See respective sections for details.       minutes of critical care time     MEDICATIONS GIVEN IN THE EMERGENCY:  Medications - No data to display    NEW PRESCRIPTIONS STARTED AT TODAY'S ER VISIT  Discharge Medication List as of 12/31/2023 12:04 PM             =================================================================    HPI    Patient information was obtained from: Patient.    Use of : N/A         Samantha Ramos is a 83 year old male with a pertinent history of hypertension, CHF, atrial fibrillation with pacemaker, who presents for evaluation of COVID-19 associated symptoms.    The patient reports that since Friday 12/29/2023 (2 days ago), he has been experiencing chest congestion and a productive cough. He endorses pain with coughing and notes that he produces a lot of phlegm. Patient says that he  is uncomfortable while sleeping due to his chest congestion that extends up through his neck, just below his ears. At baseline, he experiences lightheadedness with change of position, but he notes no change in lightheadedness with his recent symptoms. On Friday, he took Tylenol 4 times, and has been taking Coricidin 30 mL 3 times daily, which he says has helped him sleep better. He took a COVID-19 test last night which was positive.     Patient mentions that he has a pacemaker and that he has been experiencing associated shortness of breath ever since its placement. He endorses no change his shortness of breath with his recent symptoms. He also notes that he has a deviated septum and that he uses CPAP. Patient says he received his 3rd dose of COVID-19 immunization on 12/10/2023. 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    Denies chest pain, new shortness of breath, new lightheadedness, or any other concerns at this time.       REVIEW OF SYSTEMS   Review of Systems   Respiratory:  Positive for cough and chest tightness. Negative for shortness of breath.    Cardiovascular:  Negative for chest pain.   Neurological:  Negative for light-headedness.   All other systems reviewed and are negative.      PAST MEDICAL HISTORY:  No past medical history on file.    PAST SURGICAL HISTORY:  Past Surgical History:   Procedure Laterality Date    APPENDECTOMY  14 years old    CATARACT EXTRACTION Bilateral 2017,2018    HERNIA REPAIR Bilateral 1976    OTHER SURGICAL HISTORY  01/09/2012    septoturbinoplasty    TOTAL KNEE ARTHROPLASTY Left 2012    VEIN LIGATION AND STRIPPING Left 1995    [facility] [place] TOTAL KNEE ARTHROPLASTY Right 1/11/2018    Procedure: RIGHT MINIMALLY INVASIVE TOTAL KNEE ARTHROPLASTY;  Surgeon: Kurt Piper MD;  Location: [facility];  Service: Orthopedics           CURRENT MEDICATIONS:    apixaban (ELIQUIS) 5 mg Tab tablet  cholecalciferol, vitamin D3, 1,000 unit tablet  furosemide  "(LASIX) 20 MG tablet  latanoprost (XALATAN) 0.005 % ophthalmic solution  meclizine (ANTIVERT) 25 MG tablet  omeprazole (PRILOSEC) 10 MG capsule  sulfacetamide-prednisoLONE (VASOCIDIN) 10-0.23 % ophthalmic solution  tetrahydrozoline-Zn sulfate (RELIEF DROPS) 0.05-0.25 % ophthalmic solution  timolol maleate (TIMOPTIC) 0.5 % ophthalmic solution        ALLERGIES:  Allergies   Allergen Reactions    Indocin [Indomethacin] Itching       FAMILY HISTORY:  Family History   Problem Relation Age of Onset    Pacemaker Father        SOCIAL HISTORY:   Social History     Socioeconomic History    Marital status:    Tobacco Use    Smoking status: Former     Packs/day: 3     Types: Cigarettes     Quit date: 9/10/1988     Years since quittin.3    Smokeless tobacco: Never   Substance and Sexual Activity    Alcohol use: Yes     Alcohol/week: 28.0 standard drinks of alcohol     Comment: Alcoholic Drinks/day: 4 beers a day    Drug use: No       VITALS:  BP (!) 143/60   Pulse 66   Temp 99  F (37.2  C) (Temporal)   Resp 18   Ht 1.842 m (6' 0.5\")   Wt 105.7 kg (233 lb)   SpO2 97%   BMI 31.17 kg/m        PHYSICAL EXAM    Constitutional: Well developed, Well nourished, NAD, GCS 15  HENT: Normocephalic, Atraumatic, Bilateral external ears normal, Oropharynx normal, mucous membranes moist, Nose normal. Neck-  Normal range of motion, No tenderness, Supple, No stridor.  Eyes: PERRL, EOMI, Conjunctiva normal, No discharge.   Respiratory: Normal breath sounds, No respiratory distress, No wheezing, Speaks full sentences easily. No cough.  Cardiovascular: Normal heart rate, Regular rhythm, No murmurs, No rubs, No gallops. Chest wall nontender.  GI:Soft, No tenderness, No masses, No flank tenderness. No rebound or guarding.   Musculoskeletal: 2+ DP pulses. No edema.No cyanosis, No clubbing. Good range of motion in all major joints. No tenderness to palpation or major deformities noted.   Integument: Warm, Dry, No erythema, No rash. No " petechiae.   Neurologic: Alert & oriented x 3,  CN 3-12 intact Normal motor function, Normal sensory function, No focal deficits noted. Normal gait. Normal finger to nose bilaterally  Psychiatric: Affect normal, Judgment normal, Mood normal. Cooperative.          LAB:  All pertinent labs reviewed and interpreted.  Labs Ordered and Resulted from Time of ED Arrival to Time of ED Departure   INFLUENZA A/B, RSV, & SARS-COV2 PCR - Abnormal       Result Value    Influenza A PCR Negative      Influenza B PCR Negative      RSV PCR Negative      SARS CoV2 PCR Positive (*)    COMPREHENSIVE METABOLIC PANEL - Abnormal    Sodium 140      Potassium 4.4      Carbon Dioxide (CO2) 29      Anion Gap 9      Urea Nitrogen 14.1      Creatinine 1.16      GFR Estimate 63      Calcium 9.5      Chloride 102      Glucose 111 (*)     Alkaline Phosphatase 107      AST 20      ALT 13      Protein Total 6.7      Albumin 4.1      Bilirubin Total 1.3 (*)    CBC WITH PLATELETS AND DIFFERENTIAL - Abnormal    WBC Count 7.9      RBC Count 4.35 (*)     Hemoglobin 14.2      Hematocrit 42.6      MCV 98      MCH 32.6      MCHC 33.3      RDW 13.3      Platelet Count 143 (*)     % Neutrophils 76      % Lymphocytes 11      % Monocytes 11      % Eosinophils 1      % Basophils 0      % Immature Granulocytes 1      NRBCs per 100 WBC 0      Absolute Neutrophils 6.1      Absolute Lymphocytes 0.9      Absolute Monocytes 0.8      Absolute Eosinophils 0.1      Absolute Basophils 0.0      Absolute Immature Granulocytes 0.0      Absolute NRBCs 0.0         RADIOLOGY:  Reviewed all pertinent imaging. Please see official radiology report.  Chest XR,  PA & LAT   Final Result   IMPRESSION: No infiltrate, pleural effusion or pneumothorax. The cardiac and mediastinal silhouettes are normal. Left subclavian pacemaker.          EKG:    Performed at: 31-Dec-2023 10:59    Impression: Atrial flutter. Left axis deviation. Left bundle branch block. When compared with ECG of  10-Oct-2022 12:19, atrial flutter has replaced atrial fibrillation, Left bundle branch block has replaced (RBBB and left anterior fascicular block), Criteria for septal infarct are no longer present.    Rate: 60 bpm  Rhythm: Atrial flutter  Axis: * -57 113   QRS Interval: 162 ms  QTc Interval: 466/466 ms  Comparison: When compared with ECG of 10-Oct-2022 12:19, atrial flutter has replaced atrial fibrillation, Left bundle branch block has replaced (RBBB and left anterior fascicular block), Criteria for septal infarct are no longer present.    I have independently reviewed and interpreted the EKG(s) documented above.      I, Elizabeth Haskins, am serving as a scribe to document services personally performed by Dr. Brain Finney based on my observation and the provider's statements to me. I, Brain Finney MD attest that Elizabeth Haskins is acting in a scribe capacity, has observed my performance of the services and has documented them in accordance with my direction.    Brain Finney M.D.  Emergency Medicine  [facility] EMERGENCY ROOM  [address] [id number] [phone]  Dept: [phone]       Brain Finney MD  12/31/23 [id number]

## 2024-03-03 ENCOUNTER — HEALTH MAINTENANCE LETTER (OUTPATIENT)
Age: 84
End: 2024-03-03

## 2024-07-13 ENCOUNTER — HOSPITAL ENCOUNTER (OUTPATIENT)
Facility: CLINIC | Age: 84
Setting detail: OBSERVATION
Discharge: HOME | End: 2024-07-14
Attending: EMERGENCY MEDICINE | Admitting: INTERNAL MEDICINE
Payer: COMMERCIAL

## 2024-07-13 ENCOUNTER — APPOINTMENT (OUTPATIENT)
Dept: RADIOLOGY | Facility: CLINIC | Age: 84
End: 2024-07-13
Attending: EMERGENCY MEDICINE
Payer: COMMERCIAL

## 2024-07-13 DIAGNOSIS — I35.0 SEVERE AORTIC STENOSIS: Primary | ICD-10-CM

## 2024-07-13 DIAGNOSIS — R06.00 DYSPNEA, UNSPECIFIED TYPE: ICD-10-CM

## 2024-07-13 DIAGNOSIS — E87.70 HYPERVOLEMIA, UNSPECIFIED HYPERVOLEMIA TYPE: ICD-10-CM

## 2024-07-13 LAB
ALBUMIN SERPL BCG-MCNC: 4 G/DL (ref 3.5–5.2)
ALP SERPL-CCNC: 135 U/L (ref 40–150)
ALT SERPL W P-5'-P-CCNC: 25 U/L (ref 0–70)
ANION GAP SERPL CALCULATED.3IONS-SCNC: 12 MMOL/L (ref 7–15)
AST SERPL W P-5'-P-CCNC: 37 U/L (ref 0–45)
ATRIAL RATE - MUSE: 394 BPM
BASOPHILS # BLD AUTO: 0 10E3/UL (ref 0–0.2)
BASOPHILS NFR BLD AUTO: 0 %
BILIRUB DIRECT SERPL-MCNC: 0.36 MG/DL (ref 0–0.3)
BILIRUB SERPL-MCNC: 1.2 MG/DL
BUN SERPL-MCNC: 16.2 MG/DL (ref 8–23)
CALCIUM SERPL-MCNC: 9.4 MG/DL (ref 8.8–10.2)
CHLORIDE SERPL-SCNC: 99 MMOL/L (ref 98–107)
CREAT SERPL-MCNC: 0.87 MG/DL (ref 0.67–1.17)
DEPRECATED HCO3 PLAS-SCNC: 24 MMOL/L (ref 22–29)
DIASTOLIC BLOOD PRESSURE - MUSE: 70 MMHG
EGFRCR SERPLBLD CKD-EPI 2021: 87 ML/MIN/1.73M2
EOSINOPHIL # BLD AUTO: 0.1 10E3/UL (ref 0–0.7)
EOSINOPHIL NFR BLD AUTO: 1 %
ERYTHROCYTE [DISTWIDTH] IN BLOOD BY AUTOMATED COUNT: 13.5 % (ref 10–15)
FLUAV RNA SPEC QL NAA+PROBE: NEGATIVE
FLUBV RNA RESP QL NAA+PROBE: NEGATIVE
GLUCOSE SERPL-MCNC: 105 MG/DL (ref 70–99)
HCT VFR BLD AUTO: 38.8 % (ref 40–53)
HGB BLD-MCNC: 13.3 G/DL (ref 13.3–17.7)
HOLD SPECIMEN: NORMAL
IMM GRANULOCYTES # BLD: 0 10E3/UL
IMM GRANULOCYTES NFR BLD: 0 %
INTERPRETATION ECG - MUSE: NORMAL
LYMPHOCYTES # BLD AUTO: 0.6 10E3/UL (ref 0.8–5.3)
LYMPHOCYTES NFR BLD AUTO: 9 %
MAGNESIUM SERPL-MCNC: 1.3 MG/DL (ref 1.7–2.3)
MCH RBC QN AUTO: 32.8 PG (ref 26.5–33)
MCHC RBC AUTO-ENTMCNC: 34.3 G/DL (ref 31.5–36.5)
MCV RBC AUTO: 96 FL (ref 78–100)
MONOCYTES # BLD AUTO: 0.5 10E3/UL (ref 0–1.3)
MONOCYTES NFR BLD AUTO: 8 %
NEUTROPHILS # BLD AUTO: 5.5 10E3/UL (ref 1.6–8.3)
NEUTROPHILS NFR BLD AUTO: 82 %
NRBC # BLD AUTO: 0 10E3/UL
NRBC BLD AUTO-RTO: 0 /100
NT-PROBNP SERPL-MCNC: 6259 PG/ML (ref 0–1800)
P AXIS - MUSE: NORMAL DEGREES
PLATELET # BLD AUTO: 166 10E3/UL (ref 150–450)
POTASSIUM SERPL-SCNC: 3.9 MMOL/L (ref 3.4–5.3)
PR INTERVAL - MUSE: NORMAL MS
PROCALCITONIN SERPL IA-MCNC: 0.14 NG/ML
PROT SERPL-MCNC: 6.5 G/DL (ref 6.4–8.3)
QRS DURATION - MUSE: 158 MS
QT - MUSE: 464 MS
QTC - MUSE: 464 MS
R AXIS - MUSE: -65 DEGREES
RBC # BLD AUTO: 4.06 10E6/UL (ref 4.4–5.9)
RSV RNA SPEC NAA+PROBE: NEGATIVE
SARS-COV-2 RNA RESP QL NAA+PROBE: NEGATIVE
SODIUM SERPL-SCNC: 135 MMOL/L (ref 135–145)
SYSTOLIC BLOOD PRESSURE - MUSE: 139 MMHG
T AXIS - MUSE: 106 DEGREES
TROPONIN T SERPL HS-MCNC: 33 NG/L
TROPONIN T SERPL HS-MCNC: 34 NG/L
VENTRICULAR RATE- MUSE: 60 BPM
WBC # BLD AUTO: 6.7 10E3/UL (ref 4–11)

## 2024-07-13 PROCEDURE — 93005 ELECTROCARDIOGRAM TRACING: CPT | Performed by: EMERGENCY MEDICINE

## 2024-07-13 PROCEDURE — G0378 HOSPITAL OBSERVATION PER HR: HCPCS

## 2024-07-13 PROCEDURE — 82248 BILIRUBIN DIRECT: CPT | Performed by: INTERNAL MEDICINE

## 2024-07-13 PROCEDURE — 87637 SARSCOV2&INF A&B&RSV AMP PRB: CPT | Performed by: EMERGENCY MEDICINE

## 2024-07-13 PROCEDURE — 99285 EMERGENCY DEPT VISIT HI MDM: CPT | Mod: 25

## 2024-07-13 PROCEDURE — 36415 COLL VENOUS BLD VENIPUNCTURE: CPT | Performed by: EMERGENCY MEDICINE

## 2024-07-13 PROCEDURE — 71046 X-RAY EXAM CHEST 2 VIEWS: CPT

## 2024-07-13 PROCEDURE — 96374 THER/PROPH/DIAG INJ IV PUSH: CPT

## 2024-07-13 PROCEDURE — 85025 COMPLETE CBC W/AUTO DIFF WBC: CPT | Performed by: EMERGENCY MEDICINE

## 2024-07-13 PROCEDURE — 99223 1ST HOSP IP/OBS HIGH 75: CPT | Performed by: INTERNAL MEDICINE

## 2024-07-13 PROCEDURE — 80048 BASIC METABOLIC PNL TOTAL CA: CPT | Performed by: EMERGENCY MEDICINE

## 2024-07-13 PROCEDURE — 84484 ASSAY OF TROPONIN QUANT: CPT | Performed by: EMERGENCY MEDICINE

## 2024-07-13 PROCEDURE — 83880 ASSAY OF NATRIURETIC PEPTIDE: CPT | Performed by: EMERGENCY MEDICINE

## 2024-07-13 PROCEDURE — 84145 PROCALCITONIN (PCT): CPT | Performed by: INTERNAL MEDICINE

## 2024-07-13 PROCEDURE — 83735 ASSAY OF MAGNESIUM: CPT | Performed by: INTERNAL MEDICINE

## 2024-07-13 PROCEDURE — 80053 COMPREHEN METABOLIC PANEL: CPT | Performed by: EMERGENCY MEDICINE

## 2024-07-13 PROCEDURE — 250N000011 HC RX IP 250 OP 636: Performed by: EMERGENCY MEDICINE

## 2024-07-13 RX ORDER — VITAMIN B COMPLEX
50 TABLET ORAL DAILY
Status: DISCONTINUED | OUTPATIENT
Start: 2024-07-14 | End: 2024-07-14 | Stop reason: HOSPADM

## 2024-07-13 RX ORDER — ONDANSETRON 4 MG/1
4 TABLET, ORALLY DISINTEGRATING ORAL EVERY 6 HOURS PRN
Status: DISCONTINUED | OUTPATIENT
Start: 2024-07-13 | End: 2024-07-14 | Stop reason: HOSPADM

## 2024-07-13 RX ORDER — ACETAMINOPHEN 325 MG/1
650 TABLET ORAL EVERY 4 HOURS PRN
Status: DISCONTINUED | OUTPATIENT
Start: 2024-07-13 | End: 2024-07-14 | Stop reason: HOSPADM

## 2024-07-13 RX ORDER — LATANOPROST 50 UG/ML
1 SOLUTION/ DROPS OPHTHALMIC AT BEDTIME
Status: DISCONTINUED | OUTPATIENT
Start: 2024-07-13 | End: 2024-07-14 | Stop reason: HOSPADM

## 2024-07-13 RX ORDER — CARBOXYMETHYLCELLULOSE SODIUM 5 MG/ML
1 SOLUTION/ DROPS OPHTHALMIC DAILY
COMMUNITY

## 2024-07-13 RX ORDER — LOSARTAN POTASSIUM 50 MG/1
50 TABLET ORAL DAILY
Status: DISCONTINUED | OUTPATIENT
Start: 2024-07-14 | End: 2024-07-14 | Stop reason: HOSPADM

## 2024-07-13 RX ORDER — AMOXICILLIN 250 MG
2 CAPSULE ORAL 2 TIMES DAILY PRN
Status: DISCONTINUED | OUTPATIENT
Start: 2024-07-13 | End: 2024-07-14 | Stop reason: HOSPADM

## 2024-07-13 RX ORDER — FUROSEMIDE 10 MG/ML
40 INJECTION INTRAMUSCULAR; INTRAVENOUS ONCE
Status: COMPLETED | OUTPATIENT
Start: 2024-07-13 | End: 2024-07-13

## 2024-07-13 RX ORDER — AMOXICILLIN 250 MG
1 CAPSULE ORAL 2 TIMES DAILY PRN
Status: DISCONTINUED | OUTPATIENT
Start: 2024-07-13 | End: 2024-07-14 | Stop reason: HOSPADM

## 2024-07-13 RX ORDER — ONDANSETRON 2 MG/ML
4 INJECTION INTRAMUSCULAR; INTRAVENOUS EVERY 6 HOURS PRN
Status: DISCONTINUED | OUTPATIENT
Start: 2024-07-13 | End: 2024-07-14 | Stop reason: HOSPADM

## 2024-07-13 RX ORDER — LOSARTAN POTASSIUM 50 MG/1
50 TABLET ORAL DAILY
COMMUNITY
Start: 2024-05-07 | End: 2025-05-07

## 2024-07-13 RX ORDER — FUROSEMIDE 20 MG
20 TABLET ORAL DAILY PRN
Status: DISCONTINUED | OUTPATIENT
Start: 2024-07-13 | End: 2024-07-13

## 2024-07-13 RX ORDER — FUROSEMIDE 20 MG
20 TABLET ORAL DAILY
Status: DISCONTINUED | OUTPATIENT
Start: 2024-07-14 | End: 2024-07-14 | Stop reason: HOSPADM

## 2024-07-13 RX ORDER — TIMOLOL MALEATE 5 MG/ML
1 SOLUTION/ DROPS OPHTHALMIC EVERY MORNING
Status: DISCONTINUED | OUTPATIENT
Start: 2024-07-14 | End: 2024-07-14 | Stop reason: HOSPADM

## 2024-07-13 RX ORDER — CARBOXYMETHYLCELLULOSE SODIUM 5 MG/ML
1 SOLUTION/ DROPS OPHTHALMIC DAILY
Status: DISCONTINUED | OUTPATIENT
Start: 2024-07-14 | End: 2024-07-14 | Stop reason: HOSPADM

## 2024-07-13 RX ORDER — PANTOPRAZOLE SODIUM 20 MG/1
20 TABLET, DELAYED RELEASE ORAL DAILY
Status: DISCONTINUED | OUTPATIENT
Start: 2024-07-14 | End: 2024-07-14 | Stop reason: HOSPADM

## 2024-07-13 RX ORDER — ACETAMINOPHEN 650 MG/1
650 SUPPOSITORY RECTAL EVERY 4 HOURS PRN
Status: DISCONTINUED | OUTPATIENT
Start: 2024-07-13 | End: 2024-07-14 | Stop reason: HOSPADM

## 2024-07-13 RX ADMIN — FUROSEMIDE 40 MG: 10 INJECTION, SOLUTION INTRAMUSCULAR; INTRAVENOUS at 22:21

## 2024-07-13 ASSESSMENT — COLUMBIA-SUICIDE SEVERITY RATING SCALE - C-SSRS
2. HAVE YOU ACTUALLY HAD ANY THOUGHTS OF KILLING YOURSELF IN THE PAST MONTH?: NO
6. HAVE YOU EVER DONE ANYTHING, STARTED TO DO ANYTHING, OR PREPARED TO DO ANYTHING TO END YOUR LIFE?: NO
1. IN THE PAST MONTH, HAVE YOU WISHED YOU WERE DEAD OR WISHED YOU COULD GO TO SLEEP AND NOT WAKE UP?: NO

## 2024-07-13 ASSESSMENT — ACTIVITIES OF DAILY LIVING (ADL)
ADLS_ACUITY_SCORE: 35

## 2024-07-13 NOTE — ED TRIAGE NOTES
Pt arrives to ED with c/o shortness of breath and dry cough for the past 2 days. Pt denies any chest pain but endorses to abdominal pain when he coughs. Pt took a covid home test and it was negative. Shortness of breath worse when lying down even with cpap. Has pacemaker.      Triage Assessment (Adult)       Row Name 07/13/24 1649          Triage Assessment    Airway WDL WDL        Respiratory WDL    Respiratory WDL cough;X;rhythm/pattern     Rhythm/Pattern, Respiratory dyspnea upon exertion;shortness of breath     Cough Frequency infrequent     Cough Type nonproductive;dry        Skin Circulation/Temperature WDL    Skin Circulation/Temperature WDL WDL        Cardiac WDL    Cardiac WDL WDL        Peripheral/Neurovascular WDL    Peripheral Neurovascular WDL WDL        Cognitive/Neuro/Behavioral WDL    Cognitive/Neuro/Behavioral WDL WDL

## 2024-07-14 ENCOUNTER — APPOINTMENT (OUTPATIENT)
Dept: CARDIOLOGY | Facility: CLINIC | Age: 84
End: 2024-07-14
Attending: INTERNAL MEDICINE
Payer: COMMERCIAL

## 2024-07-14 VITALS
OXYGEN SATURATION: 96 % | HEART RATE: 61 BPM | BODY MASS INDEX: 31.97 KG/M2 | HEIGHT: 72 IN | WEIGHT: 236 LBS | DIASTOLIC BLOOD PRESSURE: 69 MMHG | RESPIRATION RATE: 18 BRPM | SYSTOLIC BLOOD PRESSURE: 130 MMHG | TEMPERATURE: 98.2 F

## 2024-07-14 LAB
ALBUMIN SERPL BCG-MCNC: 3.8 G/DL (ref 3.5–5.2)
ALP SERPL-CCNC: 123 U/L (ref 40–150)
ALT SERPL W P-5'-P-CCNC: 21 U/L (ref 0–70)
ANION GAP SERPL CALCULATED.3IONS-SCNC: 12 MMOL/L (ref 7–15)
AST SERPL W P-5'-P-CCNC: 29 U/L (ref 0–45)
BASOPHILS # BLD AUTO: 0 10E3/UL (ref 0–0.2)
BASOPHILS NFR BLD AUTO: 0 %
BILIRUB SERPL-MCNC: 1.1 MG/DL
BUN SERPL-MCNC: 18.1 MG/DL (ref 8–23)
CALCIUM SERPL-MCNC: 9.2 MG/DL (ref 8.8–10.2)
CHLORIDE SERPL-SCNC: 99 MMOL/L (ref 98–107)
CREAT SERPL-MCNC: 1.02 MG/DL (ref 0.67–1.17)
DEPRECATED HCO3 PLAS-SCNC: 27 MMOL/L (ref 22–29)
EGFRCR SERPLBLD CKD-EPI 2021: 74 ML/MIN/1.73M2
EOSINOPHIL # BLD AUTO: 0.1 10E3/UL (ref 0–0.7)
EOSINOPHIL NFR BLD AUTO: 2 %
ERYTHROCYTE [DISTWIDTH] IN BLOOD BY AUTOMATED COUNT: 13.4 % (ref 10–15)
GLUCOSE SERPL-MCNC: 95 MG/DL (ref 70–99)
HCT VFR BLD AUTO: 39 % (ref 40–53)
HGB BLD-MCNC: 13.3 G/DL (ref 13.3–17.7)
IMM GRANULOCYTES # BLD: 0 10E3/UL
IMM GRANULOCYTES NFR BLD: 0 %
LVEF ECHO: NORMAL
LYMPHOCYTES # BLD AUTO: 1.1 10E3/UL (ref 0.8–5.3)
LYMPHOCYTES NFR BLD AUTO: 23 %
MAGNESIUM SERPL-MCNC: 1.3 MG/DL (ref 1.7–2.3)
MAGNESIUM SERPL-MCNC: 2 MG/DL (ref 1.7–2.3)
MCH RBC QN AUTO: 33.2 PG (ref 26.5–33)
MCHC RBC AUTO-ENTMCNC: 34.1 G/DL (ref 31.5–36.5)
MCV RBC AUTO: 97 FL (ref 78–100)
MONOCYTES # BLD AUTO: 0.7 10E3/UL (ref 0–1.3)
MONOCYTES NFR BLD AUTO: 15 %
NEUTROPHILS # BLD AUTO: 2.9 10E3/UL (ref 1.6–8.3)
NEUTROPHILS NFR BLD AUTO: 59 %
NRBC # BLD AUTO: 0 10E3/UL
NRBC BLD AUTO-RTO: 0 /100
PLATELET # BLD AUTO: 137 10E3/UL (ref 150–450)
POTASSIUM SERPL-SCNC: 3.8 MMOL/L (ref 3.4–5.3)
PROT SERPL-MCNC: 6.1 G/DL (ref 6.4–8.3)
RBC # BLD AUTO: 4.01 10E6/UL (ref 4.4–5.9)
SODIUM SERPL-SCNC: 138 MMOL/L (ref 135–145)
WBC # BLD AUTO: 4.9 10E3/UL (ref 4–11)

## 2024-07-14 PROCEDURE — 83735 ASSAY OF MAGNESIUM: CPT | Mod: 91 | Performed by: INTERNAL MEDICINE

## 2024-07-14 PROCEDURE — G0378 HOSPITAL OBSERVATION PER HR: HCPCS

## 2024-07-14 PROCEDURE — 250N000013 HC RX MED GY IP 250 OP 250 PS 637: Performed by: INTERNAL MEDICINE

## 2024-07-14 PROCEDURE — C8929 TTE W OR WO FOL WCON,DOPPLER: HCPCS

## 2024-07-14 PROCEDURE — 250N000011 HC RX IP 250 OP 636: Performed by: HOSPITALIST

## 2024-07-14 PROCEDURE — 85025 COMPLETE CBC W/AUTO DIFF WBC: CPT | Performed by: INTERNAL MEDICINE

## 2024-07-14 PROCEDURE — 83735 ASSAY OF MAGNESIUM: CPT | Performed by: HOSPITALIST

## 2024-07-14 PROCEDURE — 99238 HOSP IP/OBS DSCHRG MGMT 30/<: CPT | Performed by: HOSPITALIST

## 2024-07-14 PROCEDURE — 36415 COLL VENOUS BLD VENIPUNCTURE: CPT | Performed by: INTERNAL MEDICINE

## 2024-07-14 PROCEDURE — 82040 ASSAY OF SERUM ALBUMIN: CPT | Performed by: INTERNAL MEDICINE

## 2024-07-14 PROCEDURE — 93306 TTE W/DOPPLER COMPLETE: CPT | Mod: 26 | Performed by: INTERNAL MEDICINE

## 2024-07-14 PROCEDURE — 96375 TX/PRO/DX INJ NEW DRUG ADDON: CPT

## 2024-07-14 PROCEDURE — 255N000002 HC RX 255 OP 636: Performed by: HOSPITALIST

## 2024-07-14 PROCEDURE — 36415 COLL VENOUS BLD VENIPUNCTURE: CPT | Performed by: HOSPITALIST

## 2024-07-14 RX ORDER — MAGNESIUM SULFATE 4 G/50ML
4 INJECTION INTRAVENOUS ONCE
Status: COMPLETED | OUTPATIENT
Start: 2024-07-14 | End: 2024-07-14

## 2024-07-14 RX ORDER — FUROSEMIDE 20 MG
10 TABLET ORAL DAILY
Qty: 3 TABLET | Refills: 0 | Status: SHIPPED | OUTPATIENT
Start: 2024-07-14 | End: 2024-07-20

## 2024-07-14 RX ORDER — POTASSIUM CHLORIDE 1500 MG/1
20 TABLET, EXTENDED RELEASE ORAL ONCE
Status: COMPLETED | OUTPATIENT
Start: 2024-07-14 | End: 2024-07-14

## 2024-07-14 RX ADMIN — Medication 50 MCG: at 10:18

## 2024-07-14 RX ADMIN — PERFLUTREN 3 ML: 6.52 INJECTION, SUSPENSION INTRAVENOUS at 09:30

## 2024-07-14 RX ADMIN — APIXABAN 5 MG: 5 TABLET, FILM COATED ORAL at 10:18

## 2024-07-14 RX ADMIN — Medication 1 DROP: at 10:17

## 2024-07-14 RX ADMIN — TIMOLOL MALEATE 1 DROP: 5 SOLUTION/ DROPS OPHTHALMIC at 10:17

## 2024-07-14 RX ADMIN — FUROSEMIDE 20 MG: 20 TABLET ORAL at 10:18

## 2024-07-14 RX ADMIN — POTASSIUM CHLORIDE 20 MEQ: 1500 TABLET, EXTENDED RELEASE ORAL at 07:02

## 2024-07-14 RX ADMIN — MAGNESIUM SULFATE HEPTAHYDRATE 4 G: 4 INJECTION, SOLUTION INTRAVENOUS at 07:08

## 2024-07-14 RX ADMIN — PANTOPRAZOLE SODIUM 20 MG: 20 TABLET, DELAYED RELEASE ORAL at 10:18

## 2024-07-14 RX ADMIN — LOSARTAN POTASSIUM 50 MG: 50 TABLET, FILM COATED ORAL at 10:21

## 2024-07-14 RX ADMIN — APIXABAN 5 MG: 5 TABLET, FILM COATED ORAL at 01:52

## 2024-07-14 ASSESSMENT — ACTIVITIES OF DAILY LIVING (ADL)
ADLS_ACUITY_SCORE: 24
ADLS_ACUITY_SCORE: 35
ADLS_ACUITY_SCORE: 24

## 2024-07-14 NOTE — PROGRESS NOTES
OUTPATIENT/OBSERVATION GOALS TO BE MET BEFORE DISCHARGE:  ADLs back to baseline: Yes    Activity and level of assistance: Ambulating independently.    Pain status: Pain free.    Return to near baseline physical activity: Yes     Discharge Planner Nurse   Safe discharge environment identified: Yes  Barriers to discharge: No       Entered by: Roula David RN 07/14/2024 4:00 PM   Pending discharge this evening. Wife to transport to home

## 2024-07-14 NOTE — PROGRESS NOTES
OUTPATIENT/OBSERVATION GOALS TO BE MET BEFORE DISCHARGE:  ADLs back to baseline: Yes    Activity and level of assistance: Ambulating independently.    Pain status: Pain free.    Return to near baseline physical activity: Yes     Discharge Planner Nurse   Safe discharge environment identified: Yes  Barriers to discharge: No       Entered by: Roula David RN 07/14/2024 2:03 PM   Pending discharge this evening.

## 2024-07-14 NOTE — PROGRESS NOTES
[facility]    Medicine Progress Note - Hospitalist Service    Date of Admission:  7/13/2024    Assessment & Plan   Samantha Ramos is a 83 year old male admitted for evaluation of dyspnea.  He is clinically stable, on room air.  CXR was unrevealing for acute pathology.  Dyspnea could be related to progression of aortic stenosis.  He takes furosemide infrequently as needed.  He reports clinical improvement since admission.  Given his overall clinical stability, medically ready for discharge today.  Advised patient to followup with his cardiologist within 10 days.  Expect that he would benefit from a short course of scheduled diuretics.  Volume status can be brittle with aortic stenosis, therefore plan on limited course of low dose diuretic.  We discussed that he should take 10mg furosemide (1/2 tablet) daily for the next six days.    # Aortic stenosis  # HFpEF  - furosemide 10mg daily for six days  - outpatient cardiology followup    # Afib  - apixaban       Observation Goals:   Diet: Combination Diet Low Saturated Fat Na <2400mg Diet, No Caffeine Diet      Mora Catheter: Not present  Lines: None     Cardiac Monitoring: ACTIVE order. Indication: Acute decompensated heart failure (48 hours)  Code Status: Full Code      Clinically Significant Risk Factors Present on Admission            # Hypomagnesemia: Lowest Mg = 1.3 mg/dL in last 2 days, will replace as needed    # Drug Induced Coagulation Defect: home medication list includes an anticoagulant medication    # Hypertension: Noted on problem list  # Acute heart failure with preserved ejection fraction: heart failure noted on problem list, last echo with EF >50%, and receiving IV diuretics  # Non-Invasive mechanical ventilation: current O2 Device: BiPAP/CPAP  # Acute hypoxic respiratory failure: continue supplemental O2 as needed            # Obesity: Estimated body mass index is 32.01 kg/m  as calculated from the following:    Height as of  this encounter: 1.829 m (6').    Weight as of this encounter: 107 kg (236 lb).              Disposition Plan     Medically Ready for Discharge: Anticipated Today             Kenny Jensen MD  Hospitalist Service  [facility]  Securely message with [employer] (more info)  Text page via [employer] Paging/Directory   ______________________________________________________________________    Interval History   Patient reports his breathing is better.  Denies dyspnea with ambulation.  Denies chest pain or chest pressure.  He reports lower extremity edema has improved.  He has had a cough for for the three days, though the cough is currently much better.  He normally takes furosemide only as needed, last dose was about two months ago.    Physical Exam   Vital Signs: Temp: 97.2  F (36.2  C) Temp src: Axillary BP: (!) 158/73 (RN Notified) Pulse: 63   Resp: 20 SpO2: 95 % O2 Device: BiPAP/CPAP    Weight: 236 lbs 0 oz    Gen:  sitting in bed in no extremis  Neuro: alert, conversant  CV:  nl rate, regular rhythm  Pulm: no acute resp distress, ctab  Ext: mild lower extremity edema    Medical Decision Making             Data   Reviewed:    Na 138  K 3.8  BUN 18  Cr 1    WBC 4.9  Hgb 13  Plts 137    TTE  There is moderate concentric left ventricular hypertrophy.  The visual ejection fraction is 60-65%.  The right ventricle is normal in size and function.  Moderate to severe valvular aortic stenosis. The mean AoV pressure gradient is  34mmHg. There is mild to moderate (1-2+) aortic regurgitation.  The right ventricular systolic pressure is approximated at 44mmHg.

## 2024-07-14 NOTE — H&P
[facility] MEDICINE ADMISSION HISTORY AND PHYSICAL       Assessment & Plan      This is an 83 year old with history of CHF presenting with SOB     Present on Admission (POA)    1. Elevated BNP over 5K with trop leaks, could be demand ischemia r/o ACS  - Possible CHF exacerbation  - History of mild nonischemic cardiomyopathy-ejection fraction as low as 40% in 2016, EF has normalized  - Not sure if his aortic stenosis is causing his CHF/SOB  - last ECHO was 2 years ago  - Lasix 40 mgs IV given in ED  and will continue home dose as daily for now instead of PRN   - ECHO/Tele  - CMP/Trop      Chronic Medical Conditions    1. Chronic atrial fibrillation-anticoagulated - on with Eliquis  2. Moderate aortic stenosis-due to repeat an echocardiogram around October 2024.  3. 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 Hypertension  5. Chronic SOB with emphysematous changes on CT, probably COPD, undiagnosed          VTE prophylaxis --  SCDs, if appropriate, add SQH  Diet --  Cardiac diet  Code Status -- Full  Barriers to discharge -- Admitting/Acute medical condition/s  Discharge Disposition and goals --  Unable to determine at this point, pending progress/treatment response.     PPE - I was wearing PPE including but not limited to - N95 mask, Gloves, and/or Safety glasses.  Admission Status -- Inpatient     Care plan is based on available information and patient's condition at encounter. Care plan was discussed and agreed to proceed by the patient/family member. Made aware that care plan may change.     I recommend to review/revise history/care plan for information/results not available during my encounter. All or some home medication/s were not resumed or was modified on admission due to safety concerns. Will have rounding AM doc  review safety to resume held/modified home medications.     Availability -- If need to coordinate care after night shift  --  Contact assigned AM rounding Hospitalist.     75 minutes spent by me on the date of service doing chart review, history, exam, diagnostic test results interpretation, care coordination with RN, RT and/or Pharm, & further activities per the note.      Jeffery Guzmán MD, MPH, FACP, [facility]  Internal Medicine - Hospitalist        Chief Complaint SOB      HISTORY     - Patient was seen in ED - 23  - He is here with SOB. He said, this was gradually worsening. He was coughing and hurts his belly when coughing. Denies CP or fevers  - No diarrhea or weakness. History of CHF and he only takes lasix as needed.   - ED course/treatments/referral - BNP is 5K. EKG showed non specific STT changes. ?Aflutter. WBC is normal.    - ROS --- No headache. No dizziness. No weakness.  No palpitations. No abdominal pain. No nausea or vomiting. No urinary symptoms. No bleeding symptoms. No weight loss. Rest of 12 point ROS was reviewed and negative.             Past Medical History     No past medical history on file.      Surgical History     Past Surgical History:   Procedure Laterality Date    APPENDECTOMY  14 years old    CATARACT EXTRACTION Bilateral 2017,2018    HERNIA REPAIR Bilateral 1976    OTHER SURGICAL HISTORY  01/09/2012    septoturbinoplasty    TOTAL KNEE ARTHROPLASTY Left 2012    VEIN LIGATION AND STRIPPING Left 1995    [facility] 1/11/2018    Procedure: RIGHT MINIMALLY INVASIVE TOTAL KNEE ARTHROPLASTY;  Surgeon: Kurt Piper MD;  Location: [facility];  Service: Orthopedics        Family History      Family History   Problem Relation Age of Onset    Pacemaker Father          Social History      .  Social History     Socioeconomic History    Marital status:      Spouse name: Not on file    Number of children: Not on file    Years of education: Not on file    Highest education level: Not on file   Occupational History    Not on file   Tobacco Use    Smoking status: Former     Current  packs/day: 0.00     Types: Cigarettes     Quit date: 9/10/1988     Years since quittin.8    Smokeless tobacco: Never   Substance and Sexual Activity    Alcohol use: Yes     Alcohol/week: 28.0 standard drinks of alcohol     Comment: Alcoholic Drinks/day: 4 beers a day    Drug use: No    Sexual activity: Not on file   Other Topics Concern    Not on file   Social History Narrative    Not on file     Social Determinants of Health     Financial Resource Strain: High Risk (2021)    [facility]    Financial Resource Strain     Difficulty of Paying Living Expenses: Not on file     Difficulty of Paying Living Expenses: Not on file   Food Insecurity: Not on file   Transportation Needs: Not on file   Physical Activity: Not on file   Stress: Not on file   Social Connections: Unknown (2021)    [facility], [facility]    Social Connections     Frequency of Communication with Friends and Family: Not on file   Interpersonal Safety: Not on file   Housing Stability: Not on file          Allergies        Allergies   Allergen Reactions    Indocin [Indomethacin] Itching         Prior to Admission Medications      No current facility-administered medications for this encounter.     Current Outpatient Medications   Medication Sig Dispense Refill    apixaban (ELIQUIS) 5 mg Tab tablet [APIXABAN (ELIQUIS) 5 MG TAB TABLET] Take 1 tablet (5 mg total) by mouth 2 (two) times a day. 180 tablet 0    carboxymethylcellulose PF (REFRESH PLUS) 0.5 % ophthalmic solution Place 1 drop Into the left eye daily      cholecalciferol, vitamin D3, 1,000 unit tablet [CHOLECALCIFEROL, VITAMIN D3, 1,000 UNIT TABLET] Take 2,000 Units by mouth daily.       furosemide (LASIX) 20 MG tablet Take 20 mg by mouth daily as needed (edema)      latanoprost (XALATAN) 0.005 % ophthalmic  solution [LATANOPROST (XALATAN) 0.005 % OPHTHALMIC SOLUTION] Administer 1 drop to both eyes at bedtime.      meclizine (ANTIVERT) 25 MG tablet Take 1 tablet (25 mg) by mouth 3 times daily as needed for dizziness or nausea 21 tablet 0    omeprazole (PRILOSEC) 10 MG capsule Take 1 capsule (10 mg) by mouth daily      timolol maleate (TIMOPTIC) 0.5 % ophthalmic solution Place 1 drop into the right eye every morning             Review of Systems     A 12 point comprehensive review of systems was negative except as noted above in HPI.    PHYSICAL EXAMINATION       Vitals      Vitals: BP (!) 146/74   Pulse 60   Temp 98.1  F (36.7  C) (Oral)   Resp (!) 34   Ht 1.829 m (6')   Wt 107 kg (236 lb)   SpO2 95%   BMI 32.01 kg/m    BMI= Body mass index is 32.01 kg/m .      Examination     General Appearance:  Alert, cooperative, no distress  Head:    Normocephalic, without obvious abnormality, atraumatic  EENT:  PERRL, conjunctiva/corneas clear, EOM's intact.   Neck:   Supple, symmetrical, trachea midline, no adenopathy; no NVE  Back:  Symmetric, no curvature, no CVA tenderness  Chest/Lungs: Clear to auscultation bilaterally, respirations unlabored, No tenderness or deformity. No abdominal breathing or use of accessory muscles.   Heart:    Regular rate and rhythm, S1 and S2 normal, no murmur, rub   or gallop  Abdomen: Soft, non-tender, bowel sounds active all four quadrants, not peritoneal on palpation. Not distended  Extremities:  Extremities normal, atraumatic, no swelling   Skin:  Skin color, texture, turgor normal, no rashes or lesion  Neurologic:  Awake and alert, Non lateralizing or localizing signs           Pertinent Lab     Results for orders placed or performed during the hospital encounter of 07/13/24   XR Chest 2 Views    Impression    IMPRESSION: Stable single lead left chest wall pacer. Mild cardiomegaly. No pleural effusion or pneumothorax. No evidence for CHF or pneumonia.   Basic metabolic panel   Result  Value Ref Range    Sodium 135 135 - 145 mmol/L    Potassium 3.9 3.4 - 5.3 mmol/L    Chloride 99 98 - 107 mmol/L    Carbon Dioxide (CO2) 24 22 - 29 mmol/L    Anion Gap 12 7 - 15 mmol/L    Urea Nitrogen 16.2 8.0 - 23.0 mg/dL    Creatinine 0.87 0.67 - 1.17 mg/dL    GFR Estimate 87 >60 mL/min/1.73m2    Calcium 9.4 8.8 - 10.2 mg/dL    Glucose 105 (H) 70 - 99 mg/dL   Result Value Ref Range    Troponin T, High Sensitivity 34 (H) <=22 ng/L   Nt probnp inpatient (BNP)   Result Value Ref Range    N terminal Pro BNP Inpatient 6,259 (H) 0 - 1,800 pg/mL   Symptomatic Influenza A/B, RSV, & SARS-CoV2 PCR (COVID-19) Nasopharyngeal    Specimen: Nasopharyngeal; Swab   Result Value Ref Range    Influenza A PCR Negative Negative    Influenza B PCR Negative Negative    RSV PCR Negative Negative    SARS CoV2 PCR Negative Negative   CBC with platelets and differential   Result Value Ref Range    WBC Count 6.7 4.0 - 11.0 10e3/uL    RBC Count 4.06 (L) 4.40 - 5.90 10e6/uL    Hemoglobin 13.3 13.3 - 17.7 g/dL    Hematocrit 38.8 (L) 40.0 - 53.0 %    MCV 96 78 - 100 fL    MCH 32.8 26.5 - 33.0 pg    MCHC 34.3 31.5 - 36.5 g/dL    RDW 13.5 10.0 - 15.0 %    Platelet Count 166 150 - 450 10e3/uL    % Neutrophils 82 %    % Lymphocytes 9 %    % Monocytes 8 %    % Eosinophils 1 %    % Basophils 0 %    % Immature Granulocytes 0 %    NRBCs per 100 WBC 0 <1 /100    Absolute Neutrophils 5.5 1.6 - 8.3 10e3/uL    Absolute Lymphocytes 0.6 (L) 0.8 - 5.3 10e3/uL    Absolute Monocytes 0.5 0.0 - 1.3 10e3/uL    Absolute Eosinophils 0.1 0.0 - 0.7 10e3/uL    Absolute Basophils 0.0 0.0 - 0.2 10e3/uL    Absolute Immature Granulocytes 0.0 <=0.4 10e3/uL    Absolute NRBCs 0.0 10e3/uL   Extra Heparinized Syringe   Result Value Ref Range    Hold Specimen .    Result Value Ref Range    Troponin T, High Sensitivity 33 (H) <=22 ng/L   ECG 12-LEAD WITH MUSE (LHE)   Result Value Ref Range    Systolic Blood Pressure 139 mmHg    Diastolic Blood Pressure 70 mmHg    Ventricular Rate  60 BPM    Atrial Rate 394 BPM    AZ Interval  ms    QRS Duration 158 ms     ms    QTc 464 ms    P Axis  degrees    R AXIS -65 degrees    T Axis 106 degrees    Interpretation ECG       Undetermined rhythm  Left axis deviation  Left bundle branch block  Abnormal ECG  When compared with ECG of 31-DEC-2023 10:59,  Current undetermined rhythm precludes rhythm comparison, needs review  Confirmed by SEE ED PROVIDER NOTE FOR, ECG INTERPRETATION ([id number]),  Monica Eason ([id number]) on 7/13/2024 9:40:29 PM             Pertinent Radiology

## 2024-07-14 NOTE — PROGRESS NOTES
"PRIMARY DIAGNOSIS: \"Hypervolemia\"   OUTPATIENT/OBSERVATION GOALS TO BE MET BEFORE DISCHARGE:  ADLs back to baseline: Yes    Activity and level of assistance: Ambulating independently.    Pain status: Pain free.    Return to near baseline physical activity: Yes     Discharge Planner Nurse   Safe discharge environment identified: Yes  Barriers to discharge: Yes       Entered by: Prema Shell RN 07/14/2024 5:19 AM    "

## 2024-07-14 NOTE — PROGRESS NOTES
"PRIMARY DIAGNOSIS: \"Hypervolemia\"  OUTPATIENT/OBSERVATION GOALS TO BE MET BEFORE DISCHARGE:  ADLs back to baseline: Yes    Activity and level of assistance: Ambulating independently.    Pain status: Pain free.    Return to near baseline physical activity: Yes     Discharge Planner Nurse   Safe discharge environment identified: Yes  Barriers to discharge: Yes       Entered by: Prema Shell RN 07/14/2024 5:20 AM  Patient is alert and oriented x4. VSS with some elevated bps. Afebrile. Home Cpap use overnight. No complaints of pain, sob, chest pain, n/v nor dizziness. Urinal use at bedside. SBA/independent when ambulating. On Mag and K protocols. Call light within reach.    "

## 2024-07-14 NOTE — PROGRESS NOTES
Patient discharged with wife to transport to home. VSS on RA. PIV access removed prior to discharge. Belongings remain with pt at discharge, home medication returned. AVS reviewed with pt, questions answered, education complete and new materials added for review.

## 2024-07-14 NOTE — PROGRESS NOTES
Met with pt, explained a MOON and answered all questions. Gave pt a copy, original was placed in the chart.

## 2024-07-14 NOTE — PHARMACY-ADMISSION MEDICATION HISTORY
Pharmacist Admission Medication History    Admission medication history is complete. The information provided in this note is only as accurate as the sources available at the time of the update.    Medication reconciliation/reorder completed by provider prior to medication history? No    Information Source(s): Patient and CareEverywhere/SureScripts via in-person    Pertinent Information:     Changes made to PTA medication list:  Added: Refresh tears, losartan  Deleted: vasocidin, relief drops  Changed: furosemide to daily prn      Allergies reviewed with patient and updates made in EHR: yes    Medications available for use during hospital stay: NONE.     Medication History Completed By: Lawanda Sanabria RPH 7/13/2024 10:33 PM    PTA Med List   Medication Sig Last Dose    apixaban (ELIQUIS) 5 mg Tab tablet [APIXABAN (ELIQUIS) 5 MG TAB TABLET] Take 1 tablet (5 mg total) by mouth 2 (two) times a day. 7/13/2024 at am    carboxymethylcellulose PF (REFRESH PLUS) 0.5 % ophthalmic solution Place 1 drop Into the left eye daily 7/13/2024 at am    cholecalciferol, vitamin D3, 1,000 unit tablet [CHOLECALCIFEROL, VITAMIN D3, 1,000 UNIT TABLET] Take 2,000 Units by mouth daily.  7/13/2024 at am    furosemide (LASIX) 20 MG tablet Take 20 mg by mouth daily as needed (edema) Unknown    latanoprost (XALATAN) 0.005 % ophthalmic solution [LATANOPROST (XALATAN) 0.005 % OPHTHALMIC SOLUTION] Administer 1 drop to both eyes at bedtime. 7/12/2024 at hs - has with    losartan (COZAAR) 50 MG tablet Take 50 mg by mouth daily 7/13/2024 at am    meclizine (ANTIVERT) 25 MG tablet Take 1 tablet (25 mg) by mouth 3 times daily as needed for dizziness or nausea Unknown    omeprazole (PRILOSEC) 10 MG capsule Take 1 capsule (10 mg) by mouth daily 7/13/2024 at am    timolol maleate (TIMOPTIC) 0.5 % ophthalmic solution Place 1 drop into the right eye every morning 7/13/2024 at am - has with

## 2024-07-14 NOTE — DISCHARGE SUMMARY
[facility]  Hospitalist Discharge Summary      Date of Admission:  7/13/2024  Date of Discharge:  7/14/2024  Discharging Provider: Kenny Jensen MD  Discharge Service: Hospitalist Service    Discharge Diagnoses   Severe aortic stenosis    Clinically Significant Risk Factors     # Obesity: Estimated body mass index is 32.01 kg/m  as calculated from the following:    Height as of this encounter: 1.829 m (6').    Weight as of this encounter: 107 kg (236 lb).       Follow-ups Needed After Discharge   Follow-up Appointments     Follow-up and recommended labs and tests       Follow up with your cardiologist within 10 days:  - patient instructed to take furosemide 10mg daily for six days only  - recommend checking BMP by 7/24/24 to assess renal function and   electrolytes  - TTE noted progression of aortic stenosis to moderate/severe            Unresulted Labs Ordered in the Past 30 Days of this Admission       No orders found for last 31 day(s).            Discharge Disposition   Discharged to home  Condition at discharge: Stable    Hospital Course   The patient was admitted with dyspnea and lower extremity edema in the setting of aortic stenosis.  He had clinical improvement with diuresis.  TTE demonstrated progression to moderate/severe aortic stenosis.  He was discharged home with an abbreviated course of scheduled diuretics with recommendation for followup with his outpatient cardiologist.    Consultations This Hospital Stay   None    Code Status   Full Code    Time Spent on this Encounter   I, Kenny Jensen MD, personally saw the patient today and spent less than or equal to 30 minutes discharging this patient.       Kenny Jensen MD  [facility]  [address] [place] [id number]  Phone: [phone]  Fax: [phone]  ______________________________________________________________________    Physical Exam   Vital Signs: Temp: 98.2  F  (36.8  C) Temp src: Oral BP: 130/69 Pulse: 61   Resp: 18 SpO2: 96 % O2 Device: None (Room air)    Weight: 236 lbs 0 oz    See progress note       Primary Care Physician   Eric Franklin    Discharge Orders      Reason for your hospital stay    You were admitted with difficulty breathing and leg swelling due to a narrowing of your heart valve.  You were treated with IV medication and had improvement.     Follow-up and recommended labs and tests     Follow up with your cardiologist within 10 days:  - patient instructed to take furosemide 10mg daily for six days only  - recommend checking BMP by 7/24/24 to assess renal function and electrolytes  - TTE noted progression of aortic stenosis to moderate/severe     Activity    Your activity upon discharge: activity as tolerated     CPAP - Continue Home CPAP    Continue Home CPAP per home equipment settings.     Discharge Instructions    Take 10mg furosemide daily for the next six days only (starting 7/15/24 and last day 7/20/24).  Followup with your cardiologist within 10 days.     Diet    Follow this diet upon discharge: low salt diet       Significant Results and Procedures   Most Recent 3 CBC's:  Recent Labs   Lab Test 07/14/24  0508 07/13/24  1941 12/31/23  1103   WBC 4.9 6.7 7.9   HGB 13.3 13.3 14.2   MCV 97 96 98   * 166 143*     Most Recent 3 BMP's:  Recent Labs   Lab Test 07/14/24  0508 07/13/24  1941 12/31/23  1103    135 140   POTASSIUM 3.8 3.9 4.4   CHLORIDE 99 99 102   CO2 27 24 29   BUN 18.1 16.2 14.1   CR 1.02 0.87 1.16   ANIONGAP 12 12 9   KAREEM 9.2 9.4 9.5   GLC 95 105* 111*   ,   Results for orders placed or performed during the hospital encounter of 07/13/24   XR Chest 2 Views    Narrative    EXAM: XR CHEST 2 VIEWS  LOCATION: [facility]  DATE: 7/13/2024    INDICATION: cough  COMPARISON: 12/31/2023      Impression    IMPRESSION: Stable single lead left chest wall pacer. Mild cardiomegaly. No pleural effusion or  pneumothorax. No evidence for CHF or pneumonia.   Echocardiogram Complete     Value    LVEF  60-65%    Narrative    [id number]  [id number]  [id number]  [id number] [address]     Name: MONICA ADHIKARI  MRN: [id number]  : 1940  Study Date: 2024 08:57 AM  Age: 83 yrs  Gender: Male  Patient Location: [facility]  Reason For Study: SOB  Ordering Physician: ARLEN COOPER  Performed By:      BSA: 2.3 m2  Height: 72 in  Weight: 236 lb  BP: 158/73 mmHg  ______________________________________________________________________________  Procedure  Complete Portable Echo Adult. Definity (NDC #01107-644) given intravenously.  ______________________________________________________________________________  Interpretation Summary     There is moderate concentric left ventricular hypertrophy.  The visual ejection fraction is 60-65%.  The right ventricle is normal in size and function.  Moderate to severe valvular aortic stenosis. The mean AoV pressure gradient is  34mmHg. There is mild to moderate (1-2+) aortic regurgitation.  The right ventricular systolic pressure is approximated at 44mmHg.  ______________________________________________________________________________  Left Ventricle  The left ventricle is normal in size. There is moderate concentric left  ventricular hypertrophy. The visual ejection fraction is 60-65%. Diastolic  Doppler findings (E/E' ratio and/or other parameters) suggest left ventricular  filling pressures are indeterminate. No regional wall motion abnormalities  noted.     Right Ventricle  The right ventricle is normal in size and function.     Atria  The left atrium is severely dilated. The right atrium is moderate to severely  dilated. Intact atrial septum.     Mitral Valve  Mitral valve leaflets appear normal. There is trace mitral regurgitation.     Tricuspid Valve  Tricuspid valve leaflets appear normal. There is trace to mild  tricuspid  regurgitation. The right ventricular systolic pressure is approximated at  36mmHg plus the right atrial pressure.     Aortic Valve  Moderate aortic valve calcification is present. There is mild to moderate (1-  2+) aortic regurgitation. Moderate to severe valvular aortic stenosis. The  mean AoV pressure gradient is 34mmHg.     Pulmonic Valve  The pulmonic valve is not well seen, but is grossly normal. There is trace  pulmonic valvular regurgitation.     Vessels  The aorta root is normal. Normal size ascending aorta. IVC diameter and  respiratory changes fall into an intermediate range suggesting an RA pressure  of 8 mmHg.     Pericardium  There is no pericardial effusion.     ______________________________________________________________________________  MMode/2D Measurements & Calculations  IVSd: 1.5 cm  LVIDd: 5.4 cm  LVIDs: 3.6 cm  LVPWd: 1.5 cm     FS: 33.1 %  LV mass(C)d: 367.7 grams  LV mass(C)dI: 160.9 grams/m2  Ao root diam: 3.4 cm  asc Aorta Diam: 3.6 cm  LVOT diam: 2.4 cm  LVOT area: 4.5 cm2  Ao root diam index Ht(cm/m): 1.9  Ao root diam index BSA (cm/m2): 1.5  Asc Ao diam index BSA (cm/m2): 1.6  Asc Ao diam index Ht(cm/m): 2.0  EF Biplane: 69.4 %  LA Volume (BP): 116.0 ml     LA Volume Index (BP): 50.7 ml/m2  LA Volume Indexed (AL/bp): 54.2 ml/m2  RV Base: 5.9 cm  RWT: 0.56  TAPSE: 2.1 cm     Time Measurements  MM HR: 60.0 BPM     Doppler Measurements & Calculations  MV E max risa: 89.1 cm/sec  MV dec slope: 475.0 cm/sec2  MV dec time: 0.19 sec  Ao V2 max: 369.3 cm/sec  Ao max P.0 mmHg  Ao V2 mean: 277.0 cm/sec  Ao mean P.0 mmHg  Ao V2 VTI: 94.7 cm  SU(I,D): 1.7 cm2  AI P1/2t: 798.5 msec  LV V1 VTI: 36.3 cm  SV(LVOT): 164.2 ml  SI(LVOT): 71.8 ml/m2  PA V2 max: 102.8 cm/sec  PA max P.2 mmHg  PA acc time: 0.05 sec  PI end-d risa: 97.9 cm/sec  TR max risa: 285.0 cm/sec  TR max P.5 mmHg  SU Index (cm2/m2): 0.76  E/E': 11.9  E/E' av.1  Lateral E/e': 10.3  Medial E/e': 11.9      Peak E' Quique: 7.5 cm/sec  RV S Quique: 16.3 cm/sec     ______________________________________________________________________________  Report approved by: Odalys France 07/14/2024 10:59 AM             Discharge Medications   Current Discharge Medication List        CONTINUE these medications which have CHANGED    Details   furosemide (LASIX) 20 MG tablet Take 0.5 tablets (10 mg) by mouth daily for 6 days  Qty: 3 tablet, Refills: 0    Associated Diagnoses: Severe aortic stenosis           CONTINUE these medications which have NOT CHANGED    Details   apixaban (ELIQUIS) 5 mg Tab tablet [APIXABAN (ELIQUIS) 5 MG TAB TABLET] Take 1 tablet (5 mg total) by mouth 2 (two) times a day.  Qty: 180 tablet, Refills: 0    Associated Diagnoses: Atrial fibrillation with rapid ventricular response (H)      carboxymethylcellulose PF (REFRESH PLUS) 0.5 % ophthalmic solution Place 1 drop Into the left eye daily      cholecalciferol, vitamin D3, 1,000 unit tablet [CHOLECALCIFEROL, VITAMIN D3, 1,000 UNIT TABLET] Take 2,000 Units by mouth daily.       latanoprost (XALATAN) 0.005 % ophthalmic solution [LATANOPROST (XALATAN) 0.005 % OPHTHALMIC SOLUTION] Administer 1 drop to both eyes at bedtime.      losartan (COZAAR) 50 MG tablet Take 50 mg by mouth daily      meclizine (ANTIVERT) 25 MG tablet Take 1 tablet (25 mg) by mouth 3 times daily as needed for dizziness or nausea  Qty: 21 tablet, Refills: 0      omeprazole (PRILOSEC) 10 MG capsule Take 1 capsule (10 mg) by mouth daily      timolol maleate (TIMOPTIC) 0.5 % ophthalmic solution Place 1 drop into the right eye every morning           Allergies   Allergies   Allergen Reactions    Indocin [Indomethacin] Itching

## 2024-07-18 NOTE — ED PROVIDER NOTES
EMERGENCY DEPARTMENT ENCOUNTER      NAME: Samantha Ramos  AGE: 83 year old male  YOB: 1940  MRN: [id number]  EVALUATION DATE & TIME: 7/13/2024  7:14 PM    PCP: Eric Franklin    ED PROVIDER: Sanam Crawford MD      Chief Complaint   Patient presents with    Shortness of Breath         FINAL IMPRESSION:  1. Hypervolemia, unspecified hypervolemia type    2. Dyspnea, unspecified type          ED COURSE & MEDICAL DECISION MAKING:    Pertinent Labs & Imaging studies reviewed. (See chart for details)    7:38 AM I introduced myself to the patient, obtained patient history, performed a physical exam, and discussed plan for ED workup including potential diagnostic laboratory/imaging studies and interventions.    83 year old male with a pertinent history of persistent atrial fibrillation on Eliquis, CHF, JONATHAN on CPAP, non ischemic cardiomyopathy, HTN, GERD, pacemaker who presents to this ED for evaluation of shortness of breath. He is reporting dyspnea on exertion even with minimal exertion and orthopnea not improved with CPAP with leg swelling and cough. Satting normally on room air, no acute respiratory distress but some mild conversational dyspnea noted. EKG shows atrial flutter that is rate controlled with LBBB which is unchanged from prior. No evidence of acute ischemia. Laboratory studies unrevealing other than slightly elevated troponin of 34 and on repeat 33 with BNP of 6259 concerning for CHF exacerbation. COVID 19, RSV, and influenza PCR negative. WBC normal. CXR shows Stable single lead left chest wall pacer. Mild cardiomegaly. No pleural effusion or pneumothorax. No evidence for CHF or pneumonia. Will admit for likely CHF exacerbation with his worsening dyspnea and given 40 mg of IV lasix. Spoke with the hospitalist who accepted the patient. He and his wife in agreement. Admitted in stable condition.          At the conclusion of the encounter I discussed the results of all of the tests and the  disposition. The questions were answered. The patient or family acknowledged understanding and was agreeable with the care plan.       Medical Decision Making  Obtained supplemental history:Supplemental history obtained?: Documented in chart and Family Member/Significant Other  Reviewed external records: External records reviewed?: Documented in chart  Care impacted by chronic illness:Heart Disease and Hypertension  Care significantly affected by social determinants of health:N/A  Did you consider but not order tests?: Work up considered but not performed and documented in chart, if applicable  Did you interpret images independently?: Independent interpretation of ECG and images noted in documentation, when applicable.  Consultation discussion with other provider:Did you involve another provider (consultant, , pharmacy, etc.)?: I discussed the care with another health care provider, see documentation for details.  Admit.      MEDICATIONS GIVEN IN THE EMERGENCY:  Medications   furosemide (LASIX) injection 40 mg (40 mg Intravenous $Given 7/13/24 2221)   potassium chloride mohit ER (KLOR-CON M20) CR tablet 20 mEq (20 mEq Oral $Given 7/14/24 0702)   magnesium sulfate 4 g in 50 mL sterile water intermittent infusion (4 g Intravenous $New Bag 7/14/24 0708)   perflutren lipid microsphere (DEFINITY) injection SUSP 3 mL (3 mLs Intravenous $Given 7/14/24 0930)       NEW PRESCRIPTIONS STARTED AT TODAY'S ER VISIT  Discharge Medication List as of 7/14/2024  4:18 PM             =================================================================    HPI    Patient information was obtained from: Patient, wife    Samantha Ramos is a 83 year old male with a pertinent history of persistent atrial fibrillation on Eliquis, CHF, JONATHAN on CPAP, non ischemic cardiomyopathy, HTN, GERD, pacemaker who presents to this ED for evaluation of shortness of breath.  Patient reports that he has been more short of breath the last 2 days.  States that he  cannot even lie down without feeling significantly short of breath.  He states even with his CPAP he feels short of breath.  Also reports minimal exertion worsens his dyspnea.  Has had some leg swelling.  He denies chest pain but does state he has had a little bit of upper abdominal pain when coughing.  States he has had somewhat of a cough for the past couple days as well.  Took a home COVID test that was negative.  No known fever.  No vomiting or diarrhea.  No known sick contacts.  No history of DVT or PE.  States that he is prescribed Lasix but only takes it if he has increased swelling and has not taken this currently.      REVIEW OF SYSTEMS   Pertinent positives and negatives are documented in the HPI. All other systems reviewed and are negative.      PAST MEDICAL HISTORY:  No past medical history on file.    PAST SURGICAL HISTORY:  Past Surgical History:   Procedure Laterality Date    APPENDECTOMY  14 years old    CATARACT EXTRACTION Bilateral 2017,2018    HERNIA REPAIR Bilateral 1976    OTHER SURGICAL HISTORY  01/09/2012    septoturbinoplasty    TOTAL KNEE ARTHROPLASTY Left 2012    VEIN LIGATION AND STRIPPING Left 1995    [facility] [place] TOTAL KNEE ARTHROPLASTY Right 1/11/2018    Procedure: RIGHT MINIMALLY INVASIVE TOTAL KNEE ARTHROPLASTY;  Surgeon: Kurt Piper MD;  Location: [facility];  Service: Orthopedics           CURRENT MEDICATIONS:    apixaban (ELIQUIS) 5 mg Tab tablet  carboxymethylcellulose PF (REFRESH PLUS) 0.5 % ophthalmic solution  cholecalciferol, vitamin D3, 1,000 unit tablet  furosemide (LASIX) 20 MG tablet  latanoprost (XALATAN) 0.005 % ophthalmic solution  losartan (COZAAR) 50 MG tablet  meclizine (ANTIVERT) 25 MG tablet  omeprazole (PRILOSEC) 10 MG capsule  timolol maleate (TIMOPTIC) 0.5 % ophthalmic solution        ALLERGIES:  Allergies   Allergen Reactions    Indocin [Indomethacin] Itching       FAMILY HISTORY:  Family History   Problem Relation Age of Onset    Pacemaker Father         SOCIAL HISTORY:   Social History     Socioeconomic History    Marital status:    Tobacco Use    Smoking status: Former     Current packs/day: 0.00     Types: Cigarettes     Quit date: 9/10/1988     Years since quittin.8    Smokeless tobacco: Never   Substance and Sexual Activity    Alcohol use: Yes     Alcohol/week: 28.0 standard drinks of alcohol     Comment: Alcoholic Drinks/day: 4 beers a day    Drug use: No     Social Determinants of Health      [facility]    Financial Resource Strain    [facility]    Social Connections       VITALS:  /69 (BP Location: Right arm)   Pulse 61   Temp 98.2  F (36.8  C) (Oral)   Resp 18   Ht 1.829 m (6')   Wt 107 kg (236 lb)   SpO2 96%   BMI 32.01 kg/m      PHYSICAL EXAM    Physical Exam  Constitutional: Well developed, Well nourished, NAD  HENT: Normocephalic, Atraumatic, Bilateral external ears normal, Oropharynx normal, mucous membranes moist, Nose normal. Neck-  Normal range of motion, No tenderness, Supple, No stridor.    Eyes: PERRL, EOMI, Conjunctiva normal, No discharge.   Respiratory: Normal breath sounds, No respiratory distress, No wheezing or crackles, Mildly conversationally dyspneic. Satting normally on room air.   Cardiovascular: Normal heart rate, Regular rhythm, 2+ radial pulses bilaterally   GI: Bowel sounds normal, Soft, No tenderness, No masses, No rebound or guarding.   Musculoskeletal: 2+ DP pulses. Bilateral lower extremity edema.  Good range of motion in all major joints.   Integument: Warm, Dry, No erythema, No rash. No petechiae.   Neurologic: Alert & oriented x 3, 5/5 strength in all 4 extremities bilaterally. Sensation intact to light touch in all 4 extremities and the face bilaterally. No focal deficits noted.   Psychiatric: Affect normal,  Judgment normal, Mood normal. Cooperative.      LAB:  All pertinent labs reviewed and interpreted.  Results for orders placed or performed during the hospital encounter of 07/13/24   XR Chest 2 Views    Impression    IMPRESSION: Stable single lead left chest wall pacer. Mild cardiomegaly. No pleural effusion or pneumothorax. No evidence for CHF or pneumonia.   Basic metabolic panel   Result Value Ref Range    Sodium 135 135 - 145 mmol/L    Potassium 3.9 3.4 - 5.3 mmol/L    Chloride 99 98 - 107 mmol/L    Carbon Dioxide (CO2) 24 22 - 29 mmol/L    Anion Gap 12 7 - 15 mmol/L    Urea Nitrogen 16.2 8.0 - 23.0 mg/dL    Creatinine 0.87 0.67 - 1.17 mg/dL    GFR Estimate 87 >60 mL/min/1.73m2    Calcium 9.4 8.8 - 10.2 mg/dL    Glucose 105 (H) 70 - 99 mg/dL   Result Value Ref Range    Troponin T, High Sensitivity 34 (H) <=22 ng/L   Nt probnp inpatient (BNP)   Result Value Ref Range    N terminal Pro BNP Inpatient 6,259 (H) 0 - 1,800 pg/mL   Symptomatic Influenza A/B, RSV, & SARS-CoV2 PCR (COVID-19) Nasopharyngeal    Specimen: Nasopharyngeal; Swab   Result Value Ref Range    Influenza A PCR Negative Negative    Influenza B PCR Negative Negative    RSV PCR Negative Negative    SARS CoV2 PCR Negative Negative   CBC with platelets and differential   Result Value Ref Range    WBC Count 6.7 4.0 - 11.0 10e3/uL    RBC Count 4.06 (L) 4.40 - 5.90 10e6/uL    Hemoglobin 13.3 13.3 - 17.7 g/dL    Hematocrit 38.8 (L) 40.0 - 53.0 %    MCV 96 78 - 100 fL    MCH 32.8 26.5 - 33.0 pg    MCHC 34.3 31.5 - 36.5 g/dL    RDW 13.5 10.0 - 15.0 %    Platelet Count 166 150 - 450 10e3/uL    % Neutrophils 82 %    % Lymphocytes 9 %    % Monocytes 8 %    % Eosinophils 1 %    % Basophils 0 %    % Immature Granulocytes 0 %    NRBCs per 100 WBC 0 <1 /100    Absolute Neutrophils 5.5 1.6 - 8.3 10e3/uL    Absolute Lymphocytes 0.6 (L) 0.8 - 5.3 10e3/uL    Absolute Monocytes 0.5 0.0 - 1.3 10e3/uL    Absolute Eosinophils 0.1 0.0 - 0.7 10e3/uL    Absolute Basophils 0.0  0.0 - 0.2 10e3/uL    Absolute Immature Granulocytes 0.0 <=0.4 10e3/uL    Absolute NRBCs 0.0 10e3/uL   Extra Heparinized Syringe   Result Value Ref Range    Hold Specimen .    Result Value Ref Range    Troponin T, High Sensitivity 33 (H) <=22 ng/L   Result Value Ref Range    Procalcitonin 0.14 <0.50 ng/mL   Hepatic panel   Result Value Ref Range    Protein Total 6.5 6.4 - 8.3 g/dL    Albumin 4.0 3.5 - 5.2 g/dL    Bilirubin Total 1.2 <=1.2 mg/dL    Alkaline Phosphatase 135 40 - 150 U/L    AST 37 0 - 45 U/L    ALT 25 0 - 70 U/L    Bilirubin Direct 0.36 (H) 0.00 - 0.30 mg/dL   Result Value Ref Range    Magnesium 1.3 (L) 1.7 - 2.3 mg/dL   Comprehensive metabolic panel   Result Value Ref Range    Sodium 138 135 - 145 mmol/L    Potassium 3.8 3.4 - 5.3 mmol/L    Carbon Dioxide (CO2) 27 22 - 29 mmol/L    Anion Gap 12 7 - 15 mmol/L    Urea Nitrogen 18.1 8.0 - 23.0 mg/dL    Creatinine 1.02 0.67 - 1.17 mg/dL    GFR Estimate 74 >60 mL/min/1.73m2    Calcium 9.2 8.8 - 10.2 mg/dL    Chloride 99 98 - 107 mmol/L    Glucose 95 70 - 99 mg/dL    Alkaline Phosphatase 123 40 - 150 U/L    AST 29 0 - 45 U/L    ALT 21 0 - 70 U/L    Protein Total 6.1 (L) 6.4 - 8.3 g/dL    Albumin 3.8 3.5 - 5.2 g/dL    Bilirubin Total 1.1 <=1.2 mg/dL   CBC with platelets and differential   Result Value Ref Range    WBC Count 4.9 4.0 - 11.0 10e3/uL    RBC Count 4.01 (L) 4.40 - 5.90 10e6/uL    Hemoglobin 13.3 13.3 - 17.7 g/dL    Hematocrit 39.0 (L) 40.0 - 53.0 %    MCV 97 78 - 100 fL    MCH 33.2 (H) 26.5 - 33.0 pg    MCHC 34.1 31.5 - 36.5 g/dL    RDW 13.4 10.0 - 15.0 %    Platelet Count 137 (L) 150 - 450 10e3/uL    % Neutrophils 59 %    % Lymphocytes 23 %    % Monocytes 15 %    % Eosinophils 2 %    % Basophils 0 %    % Immature Granulocytes 0 %    NRBCs per 100 WBC 0 <1 /100    Absolute Neutrophils 2.9 1.6 - 8.3 10e3/uL    Absolute Lymphocytes 1.1 0.8 - 5.3 10e3/uL    Absolute Monocytes 0.7 0.0 - 1.3 10e3/uL    Absolute Eosinophils 0.1 0.0 - 0.7 10e3/uL     Absolute Basophils 0.0 0.0 - 0.2 10e3/uL    Absolute Immature Granulocytes 0.0 <=0.4 10e3/uL    Absolute NRBCs 0.0 10e3/uL   Result Value Ref Range    Magnesium 1.3 (L) 1.7 - 2.3 mg/dL   Result Value Ref Range    Magnesium 2.0 1.7 - 2.3 mg/dL   ECG 12-LEAD WITH MUSE (LHE)   Result Value Ref Range    Systolic Blood Pressure 139 mmHg    Diastolic Blood Pressure 70 mmHg    Ventricular Rate 60 BPM    Atrial Rate 394 BPM    IN Interval  ms    QRS Duration 158 ms     ms    QTc 464 ms    P Axis  degrees    R AXIS -65 degrees    T Axis 106 degrees    Interpretation ECG       Undetermined rhythm  Left axis deviation  Left bundle branch block  Abnormal ECG  When compared with ECG of 31-DEC-2023 10:59,  Current undetermined rhythm precludes rhythm comparison, needs review  Confirmed by SEE ED PROVIDER NOTE FOR, ECG INTERPRETATION ([id number]),  Monica Eason ([id number]) on 7/13/2024 9:40:29 PM     Echocardiogram Complete   Result Value Ref Range    LVEF  60-65%        RADIOLOGY:  Reviewed all pertinent imaging. Please see official radiology report.  Echocardiogram Complete   Final Result      XR Chest 2 Views   Final Result   IMPRESSION: Stable single lead left chest wall pacer. Mild cardiomegaly. No pleural effusion or pneumothorax. No evidence for CHF or pneumonia.          EKG:    Performed at: 4:49 pm    Impression: Rate controlled a flutter with left bundle branch block and left axis deviation.  Unchanged from prior EKG on 12/31/2023.  No evidence of acute ischemia.     I have independently reviewed and interpreted the EKG(s) documented above.    PROCEDURES:   None      Redicam System Documentation:   CMS Diagnoses:                 Sanam Crawford MD  [facility]  [address] [id number] [phone]       Sanam Crawford MD  07/19/24 2122

## 2024-12-03 ENCOUNTER — HOSPITAL ENCOUNTER (OUTPATIENT)
Facility: CLINIC | Age: 84
Setting detail: OBSERVATION
End: 2024-12-03
Attending: EMERGENCY MEDICINE
Payer: COMMERCIAL

## 2024-12-03 ENCOUNTER — APPOINTMENT (OUTPATIENT)
Dept: RADIOLOGY | Facility: CLINIC | Age: 84
End: 2024-12-03
Attending: EMERGENCY MEDICINE
Payer: COMMERCIAL

## 2024-12-03 DIAGNOSIS — T84.019A: ICD-10-CM

## 2024-12-03 LAB
ATRIAL RATE - MUSE: 73 BPM
DIASTOLIC BLOOD PRESSURE - MUSE: NORMAL MMHG
ERYTHROCYTE [DISTWIDTH] IN BLOOD BY AUTOMATED COUNT: 13.3 % (ref 10–15)
HCT VFR BLD AUTO: 37.2 % (ref 40–53)
HGB BLD-MCNC: 12.8 G/DL (ref 13.3–17.7)
HOLD SPECIMEN: NORMAL
INTERPRETATION ECG - MUSE: NORMAL
MCH RBC QN AUTO: 34.5 PG (ref 26.5–33)
MCHC RBC AUTO-ENTMCNC: 34.4 G/DL (ref 31.5–36.5)
MCV RBC AUTO: 100 FL (ref 78–100)
P AXIS - MUSE: NORMAL DEGREES
PLATELET # BLD AUTO: 138 10E3/UL (ref 150–450)
PR INTERVAL - MUSE: NORMAL MS
QRS DURATION - MUSE: 182 MS
QT - MUSE: 490 MS
QTC - MUSE: 539 MS
R AXIS - MUSE: -39 DEGREES
RBC # BLD AUTO: 3.71 10E6/UL (ref 4.4–5.9)
SYSTOLIC BLOOD PRESSURE - MUSE: NORMAL MMHG
T AXIS - MUSE: 122 DEGREES
VENTRICULAR RATE- MUSE: 73 BPM
WBC # BLD AUTO: 7.3 10E3/UL (ref 4–11)

## 2024-12-03 PROCEDURE — 999N000157 HC STATISTIC RCP TIME EA 10 MIN

## 2024-12-03 PROCEDURE — 73562 X-RAY EXAM OF KNEE 3: CPT | Mod: RT

## 2024-12-03 PROCEDURE — 36415 COLL VENOUS BLD VENIPUNCTURE: CPT | Performed by: EMERGENCY MEDICINE

## 2024-12-03 PROCEDURE — 99285 EMERGENCY DEPT VISIT HI MDM: CPT | Mod: 25

## 2024-12-03 PROCEDURE — 85014 HEMATOCRIT: CPT | Performed by: HOSPITALIST

## 2024-12-03 PROCEDURE — 85041 AUTOMATED RBC COUNT: CPT | Performed by: HOSPITALIST

## 2024-12-03 PROCEDURE — 250N000013 HC RX MED GY IP 250 OP 250 PS 637: Performed by: HOSPITALIST

## 2024-12-03 PROCEDURE — 93005 ELECTROCARDIOGRAM TRACING: CPT | Performed by: EMERGENCY MEDICINE

## 2024-12-03 PROCEDURE — 86900 BLOOD TYPING SEROLOGIC ABO: CPT | Performed by: HOSPITALIST

## 2024-12-03 PROCEDURE — G0378 HOSPITAL OBSERVATION PER HR: HCPCS

## 2024-12-03 PROCEDURE — 99222 1ST HOSP IP/OBS MODERATE 55: CPT | Performed by: HOSPITALIST

## 2024-12-03 PROCEDURE — 94660 CPAP INITIATION&MGMT: CPT

## 2024-12-03 RX ORDER — ONDANSETRON 4 MG/1
4 TABLET, ORALLY DISINTEGRATING ORAL EVERY 6 HOURS PRN
Status: DISCONTINUED | OUTPATIENT
Start: 2024-12-03 | End: 2024-12-07 | Stop reason: HOSPADM

## 2024-12-03 RX ORDER — AMOXICILLIN 250 MG
1 CAPSULE ORAL 2 TIMES DAILY PRN
Status: DISCONTINUED | OUTPATIENT
Start: 2024-12-03 | End: 2024-12-07 | Stop reason: HOSPADM

## 2024-12-03 RX ORDER — TIMOLOL MALEATE 5 MG/ML
1 SOLUTION/ DROPS OPHTHALMIC EVERY MORNING
Status: DISCONTINUED | OUTPATIENT
Start: 2024-12-04 | End: 2024-12-07 | Stop reason: HOSPADM

## 2024-12-03 RX ORDER — PROCHLORPERAZINE MALEATE 5 MG/1
5 TABLET ORAL EVERY 6 HOURS PRN
Status: DISCONTINUED | OUTPATIENT
Start: 2024-12-03 | End: 2024-12-07 | Stop reason: HOSPADM

## 2024-12-03 RX ORDER — CARBOXYMETHYLCELLULOSE SODIUM 5 MG/ML
1 SOLUTION/ DROPS OPHTHALMIC 4 TIMES DAILY PRN
Status: DISCONTINUED | OUTPATIENT
Start: 2024-12-03 | End: 2024-12-07 | Stop reason: HOSPADM

## 2024-12-03 RX ORDER — LATANOPROST 50 UG/ML
1 SOLUTION/ DROPS OPHTHALMIC AT BEDTIME
Status: DISCONTINUED | OUTPATIENT
Start: 2024-12-03 | End: 2024-12-07 | Stop reason: HOSPADM

## 2024-12-03 RX ORDER — SULFACETAMIDE SODIUM AND PREDNISOLONE SODIUM PHOSPHATE 100; 2.3 MG/ML; MG/ML
6 SOLUTION/ DROPS OPHTHALMIC 2 TIMES DAILY
COMMUNITY
Start: 2024-11-29

## 2024-12-03 RX ORDER — SULFACETAMIDE SODIUM AND PREDNISOLONE SODIUM PHOSPHATE 100; 2.3 MG/ML; MG/ML
6 SOLUTION/ DROPS OPHTHALMIC 2 TIMES DAILY
Status: DISCONTINUED | OUTPATIENT
Start: 2024-12-03 | End: 2024-12-07 | Stop reason: HOSPADM

## 2024-12-03 RX ORDER — LOSARTAN POTASSIUM 50 MG/1
50 TABLET ORAL DAILY
Status: DISCONTINUED | OUTPATIENT
Start: 2024-12-04 | End: 2024-12-07 | Stop reason: HOSPADM

## 2024-12-03 RX ORDER — ONDANSETRON 2 MG/ML
4 INJECTION INTRAMUSCULAR; INTRAVENOUS EVERY 6 HOURS PRN
Status: DISCONTINUED | OUTPATIENT
Start: 2024-12-03 | End: 2024-12-07 | Stop reason: HOSPADM

## 2024-12-03 RX ORDER — PANTOPRAZOLE SODIUM 20 MG/1
20 TABLET, DELAYED RELEASE ORAL DAILY
Status: DISCONTINUED | OUTPATIENT
Start: 2024-12-04 | End: 2024-12-07 | Stop reason: HOSPADM

## 2024-12-03 RX ORDER — AMOXICILLIN 250 MG
2 CAPSULE ORAL 2 TIMES DAILY PRN
Status: DISCONTINUED | OUTPATIENT
Start: 2024-12-03 | End: 2024-12-07 | Stop reason: HOSPADM

## 2024-12-03 RX ADMIN — Medication 200 MG: at 21:41

## 2024-12-03 RX ADMIN — LATANOPROST 1 DROP: 50 SOLUTION/ DROPS OPHTHALMIC at 21:40

## 2024-12-03 RX ADMIN — SULFACETAMIDE SODIUM AND PREDNISOLONE SODIUM PHOSPHATE 6 DROP: 100; 2.3 SOLUTION/ DROPS OPHTHALMIC at 21:40

## 2024-12-03 ASSESSMENT — ACTIVITIES OF DAILY LIVING (ADL)
ADLS_ACUITY_SCORE: 42
ADLS_ACUITY_SCORE: 44
ADLS_ACUITY_SCORE: 42
ADLS_ACUITY_SCORE: 44
ADLS_ACUITY_SCORE: 44
ADLS_ACUITY_SCORE: 42
ADLS_ACUITY_SCORE: 42

## 2024-12-03 ASSESSMENT — COLUMBIA-SUICIDE SEVERITY RATING SCALE - C-SSRS
6. HAVE YOU EVER DONE ANYTHING, STARTED TO DO ANYTHING, OR PREPARED TO DO ANYTHING TO END YOUR LIFE?: NO
1. IN THE PAST MONTH, HAVE YOU WISHED YOU WERE DEAD OR WISHED YOU COULD GO TO SLEEP AND NOT WAKE UP?: NO
2. HAVE YOU ACTUALLY HAD ANY THOUGHTS OF KILLING YOURSELF IN THE PAST MONTH?: NO

## 2024-12-03 NOTE — H&P
[facility]    History and Physical - Hospitalist Service       Date of Admission:  12/3/2024    Assessment & Plan      Samantha Ramos is a 84 year old male presenting with right knee pain and swelling.  He is clinically stable. Imaging demonstrates a periprosthetic femur fracture.  Awaiting further input from orthopaedics regarding need for surgical intervention.  Hold apixaban for now.  Effusion may be hemorrhagic as well given he is on apixaban.    There is no role to check an INR in the setting of apixaban use.  This assay does not reflect the degree of anticoagulation in this context.  The patient is considered to currently be fully anticoagulated given last dose of apixaban was on 12/3/24.    # Periprosthetic knee fracture  # Right knee effusion, possible hemorrhagic  - NPO at [facility]  - orthopaedics consult  - CBC    # Afib  - hold apixaban for now  - do not order PT/INR    # JONATHAN  - CPAP    # HTN  - losartan       Observation Goals: -diagnostic tests and consults completed and resulted, Nurse to notify provider when observation goals have been met and patient is ready for discharge.  Diet: Regular Diet Adult  NPO per Anesthesia Guidelines for Procedure/Surgery Except for: Meds    Mora Catheter: Not present  Lines: None     Cardiac Monitoring: None  Code Status: Full Code    Clinically Significant Risk Factors Present on Admission                # Drug Induced Coagulation Defect: home medication list includes an anticoagulant medication    # Hypertension: Noted on problem list  # Chronic heart failure with preserved ejection fraction: heart failure noted on problem list and last echo with EF >50%          # Obesity: Estimated body mass index is 31.74 kg/m  as calculated from the following:    Height as of this encounter: 1.829 m (6').    Weight as of this encounter: 106.1 kg (234 lb).              Disposition Plan     Medically Ready for Discharge: Anticipated in 2-4 Days           Kenny  MD Mikey  Hospitalist Service  [facility]  Securely message with [employer] (more info)  Text page via [facility] Paging/Directory     ______________________________________________________________________    Chief Complaint   Right knee pain, swelling    History is obtained from the patient    History of Present Illness   Samantha Ramos is a 84 year old male who presents with a chief complaint of right knee pain and swelling.    The patient reports having a fall at home.  He denies injuries outside his right knee.  He developed swelling in the right knee.  He denies loss of consciousness.  He is unsure if he hit his head.  Denies dizziness, nausea, vomiting, headache, blurry vision, or double vision.  Denies chest pain, chest pressure, or dyspnea.    Last dose of apixaban was 930 this morning.      Past Medical History    History reviewed. No pertinent past medical history.    Past Surgical History   Past Surgical History:   Procedure Laterality Date    APPENDECTOMY  14 years old    CATARACT EXTRACTION Bilateral 2017,2018    HERNIA REPAIR Bilateral 1976    OTHER SURGICAL HISTORY  01/09/2012    septoturbinoplasty    TOTAL KNEE ARTHROPLASTY Left 2012    VEIN LIGATION AND STRIPPING Left 1995    [facility] TOTAL KNEE ARTHROPLASTY Right 1/11/2018    Procedure: RIGHT MINIMALLY INVASIVE TOTAL KNEE ARTHROPLASTY;  Surgeon: Kurt Piper MD;  Location: [facility];  Service: Orthopedics       Prior to Admission Medications   Prior to Admission Medications   Prescriptions Last Dose Informant Patient Reported? Taking?   Magnesium Oxide 250 MG TABS 12/2/2024 Bedtime  Yes Yes   Sig: Take 1 tablet by mouth at bedtime.   apixaban (ELIQUIS) 5 mg Tab tablet 12/3/2024 Morning  No Yes   Sig: [APIXABAN (ELIQUIS) 5 MG TAB TABLET] Take 1 tablet (5 mg total) by mouth 2 (two) times a day.   carboxymethylcellulose PF (REFRESH PLUS) 0.5 % ophthalmic solution 12/3/2024 Morning  Yes Yes   Sig: Place 1 drop into both eyes 4  times daily as needed for dry eyes (Using scheduled in the left eye QAM, but is using daily as needed in both eyes as well.).   cholecalciferol, vitamin D3, 1,000 unit tablet 12/2/2024 Bedtime  Yes Yes   Sig: [CHOLECALCIFEROL, VITAMIN D3, 1,000 UNIT TABLET] Take 2,000 Units by mouth daily.    furosemide (LASIX) 20 MG tablet 12/2/2024 Morning  No Yes   Sig: Take 0.5 tablets (10 mg) by mouth daily for 6 days   latanoprost (XALATAN) 0.005 % ophthalmic solution 12/2/2024 Bedtime  Yes Yes   Sig: [LATANOPROST (XALATAN) 0.005 % OPHTHALMIC SOLUTION] Administer 1 drop to both eyes at bedtime.   losartan (COZAAR) 50 MG tablet 12/3/2024 Morning  Yes Yes   Sig: Take 50 mg by mouth daily   omeprazole (PRILOSEC) 10 MG capsule 12/3/2024 Morning  Yes Yes   Sig: Take 1 capsule (10 mg) by mouth daily   sulfacetamide-prednisoLONE (VASOCIDIN) 10-0.23 % ophthalmic solution 12/3/2024 Morning  Yes Yes   Sig: Place 6 drops into the right ear 2 times daily. OTIC USE   timolol maleate (TIMOPTIC) 0.5 % ophthalmic solution 12/3/2024 Morning  Yes Yes   Sig: Place 1 drop into the right eye every morning      Facility-Administered Medications: None           Physical Exam   Vital Signs: Temp: 97  F (36.1  C) Temp src: Temporal BP: (!) 146/75 Pulse: 66   Resp: 28 SpO2: 97 % O2 Device: None (Room air)    Weight: 234 lbs 0 oz    Gen:  lying in bed in no extremis  Neuro:  alert, conversant  MSK:  right knee with effusion  CV:  nl rate, regular rhythm  Pulm:  no acute resp distress, ctab anteriorly  GI:  abdomen NTTP    Medical Decision Making             Data   Reviewed:    XR R knee  IMPRESSION: Status post right total knee arthroplasty. Acute nondisplaced periprosthetic fracture at the medial femoral condyle. Moderate knee joint effusion. There is normal joint alignment.

## 2024-12-03 NOTE — MEDICATION SCRIBE - ADMISSION MEDICATION HISTORY
Medication Scribe Admission Medication History    Admission medication history is complete. The information provided in this note is only as accurate as the sources available at the time of the update.    Information Source(s): Patient, Family member, and CareEverywhere/SureScripts via in-person and phone    Pertinent Information: Spoke with the patient and his spouse as well as contacted the patient's pharmacy for the SIG on a new Rx the patient recently got for sodium sulfacetamide-prednisolone solution. Taking the ear solution in the RIGHT ear for otitis.    Currently on apixaban 5 mg BID. Last dose this morning.     Missed today's dose of furosemide, but usually takes it daily in the morning. On magnesium supplement and reported that he was hospitalized in the past for hypomagnesemia.     Medications currently not available for use during hospital stay. Family/Patient representative states they will bring CPAP machine, latanoprost 0.005% ophthalmic solution, timolol maleate 0.5% ophthalmic solution, and sulfacetamide-prednisolone 10-0.23% otic solution to [facility].     Changes made to PTA medication list:  Added:   Sodium sulfacetamide-prednisolone 10-0.23% solution   Magnesium oxide 250 mg  Deleted:   Meclizine 25 mg  Changed:   Carboxymethylcellulose 0.5% one daily in the left eye --> QID each eye PRN    Allergies reviewed with patient and updates made in EHR: yes    Medication History Completed By: Viral Bunch 12/3/2024 3:52 PM    PTA Med List   Medication Sig Note Last Dose/Taking    apixaban (ELIQUIS) 5 mg Tab tablet [APIXABAN (ELIQUIS) 5 MG TAB TABLET] Take 1 tablet (5 mg total) by mouth 2 (two) times a day.  12/3/2024 Morning    carboxymethylcellulose PF (REFRESH PLUS) 0.5 % ophthalmic solution Place 1 drop into both eyes 4 times daily as needed for dry eyes (Using scheduled in the left eye QAM, but is using daily as needed in both eyes as well.).  12/3/2024 Morning    cholecalciferol,  vitamin D3, 1,000 unit tablet [CHOLECALCIFEROL, VITAMIN D3, 1,000 UNIT TABLET] Take 2,000 Units by mouth daily.   12/2/2024 Bedtime    furosemide (LASIX) 20 MG tablet Take 0.5 tablets (10 mg) by mouth daily for 6 days  12/2/2024 Morning    latanoprost (XALATAN) 0.005 % ophthalmic solution [LATANOPROST (XALATAN) 0.005 % OPHTHALMIC SOLUTION] Administer 1 drop to both eyes at bedtime.  12/2/2024 Bedtime    losartan (COZAAR) 50 MG tablet Take 50 mg by mouth daily  12/3/2024 Morning    Magnesium Oxide 250 MG TABS Take 1 tablet by mouth at bedtime.  12/2/2024 Bedtime    omeprazole (PRILOSEC) 10 MG capsule Take 1 capsule (10 mg) by mouth daily  12/3/2024 Morning    sulfacetamide-prednisoLONE (VASOCIDIN) 10-0.23 % ophthalmic solution Place 6 drops into the right ear 2 times daily. OTIC USE 12/3/2024: FOR OTIC USE 12/3/2024 Morning    timolol maleate (TIMOPTIC) 0.5 % ophthalmic solution Place 1 drop into the right eye every morning  12/3/2024 Morning

## 2024-12-03 NOTE — ED TRIAGE NOTES
Pt with slip and fall in kitchen on Saturday onto both knees without head injury or LOC. +blood thinners. Pt reporting R knee pain and swelling. Pulse rates fluctuating in triage from high 30s to 60s. Radial pulse at 60s. Pt with pacemaker. Denies any SOB, CP, or dizziness.      Triage Assessment (Adult)       Row Name 12/03/24 1315          Triage Assessment    Airway WDL WDL        Respiratory WDL    Respiratory WDL WDL        Skin Circulation/Temperature WDL    Skin Circulation/Temperature WDL WDL        Cardiac WDL    Cardiac WDL X;rhythm  paced     Pulse Rate & Regularity radial pulse irregular        Peripheral/Neurovascular WDL    Peripheral Neurovascular WDL WDL        Cognitive/Neuro/Behavioral WDL    Cognitive/Neuro/Behavioral WDL WDL

## 2024-12-03 NOTE — ED NOTES
Pt states his wife had been cleaning saturday and spilled a little wax on floor and he slipped on it and fell landed on both knees, right knee has been painful since, states could barely walk after for a few days is able to get up and down today and ambulate but still pain with movement of right leg, some swelling noted to right knee, but also has chronic BLE edema. Pt denies chest pain, denies shortness of breath, denies hitting head.

## 2024-12-03 NOTE — ED PROVIDER NOTES
EMERGENCY DEPARTMENT ENCOUNTER      NAME: Samantha Ramos  AGE: 84 year old male  YOB: 1940  MRN: [id number]  EVALUATION DATE & TIME: 12/3/2024  1:18 PM    PCP: Eric Franklin    ED PROVIDER: Diaz Glass M.D.      Chief Complaint   Patient presents with    Knee Injury         FINAL IMPRESSION:  Right knee periprosthetic fracture      ED COURSE & MEDICAL DECISION MAKING:    Pertinent Labs & Imaging studies reviewed. (See chart for details)  84 year old male presents to the Emergency Department for evaluation of right knee swelling discomfort.  Patient had a mechanical fall few days earlier on a slippery floor.  Patient fell to both knees but only having difficulties right knee.  Worried about slight increased swelling.  Patient is on chronic anticoagulation due to atrial fibrillation.  Patient also has had prior bilateral knee replacements.  Exam reveals moderately obese male mild distress.  Vital signs unremarkable.  Cardiac exam with occasional extrasystole.  Knee with slight effusion.  Minimal laxity on the right.  Minimal tenderness.  Able to extend.  Patient likely with mild traumatic effusion.  Will obtain films to assess for potential loosening of prosthesis.  Patient arrives with his wife provides additional collaborative information.. Patient appears non toxic with stable vitals signs. Overall exam is benign.      1:33 PM  I met with the patient for the initial interview and physical examination. Discussed plan for treatment and workup in the ED.    2:41 PM.  Patient with medial distal femur periprosthetic fracture.  Results confirmed by radiology.  Will consult with orthopedic  3:15 PM.  Patient discussed with [facility].  Will review the films and determine disposition.  3:25 PM.  Patient discussed with [facility] once again.  Recommends hospitalization with n.p.o. after midnight.  Patient to be evaluated in the morning.  May require operative repair.  Baseline blood work, EKG, chest  x-ray ordered.  Call placed to the admitting physician.    At the conclusion of the encounter I discussed the results of all of the tests and the disposition. The questions were answered and return precautions provided. The patient or family acknowledged understanding and was agreeable with the care plan.         MEDICATIONS GIVEN IN THE EMERGENCY:  Medications - No data to display    NEW PRESCRIPTIONS STARTED AT TODAY'S ER VISIT  New Prescriptions    No medications on file          =================================================================    HPI    Patient information was obtained from: patient    Use of Intrepreter: N/A         Samantha Ramos is a 84 year old male with a pertient medical history of atrial fibrillation, smoking, hypervolemia who presents to the ED for evaluation of fall injury.    Patient states he slipped and fell on his knee on Saturday. Patient states he has been having issues with rotating and lifting his leg as well as getting off of the couch because of his knee pain and swelling. Patient states his swelling and pain have gotten better since Saturday. Patient states he has taken Tylenol for pain. Patient states he called [facility] to see his PCP when he was informed they will not be in for the next few weeks. Patient then came to the ED to address any potential concerns. Patient states his pain is currently a 4/10. Of note, Patient has a pacemaker and has had both knees replaced.    Patient does not endorse using ice, or any other concerns at this time.    Per chart review:  On 07/13/2024-07/14/2024 patient was admitted to [facility] for aortic stenosis. Patient was given IV medications and improved. Patient was discharged in stable condition with instructions to follow up with PCP and cardiologist.      REVIEW OF SYSTEMS   Constitutional:  Denies fever, chills  Respiratory:  Denies productive cough or increased work of breathing  Cardiovascular:  Denies chest pain,  palpitations  GI:  Denies abdominal pain, nausea, vomiting, or change in bowel or bladder habits   Musculoskeletal:  Denies any new muscle/joint swelling  Skin:  Denies rash   Neurologic:  Denies focal weakness  All systems negative except as marked.     PAST MEDICAL HISTORY:  History reviewed. No pertinent past medical history.    PAST SURGICAL HISTORY:  Past Surgical History:   Procedure Laterality Date    APPENDECTOMY  14 years old    CATARACT EXTRACTION Bilateral 2017,2018    HERNIA REPAIR Bilateral 1976    OTHER SURGICAL HISTORY  01/09/2012    septoturbinoplasty    TOTAL KNEE ARTHROPLASTY Left 2012    VEIN LIGATION AND STRIPPING Left 1995    ZZC TOTAL KNEE ARTHROPLASTY Right 1/11/2018    Procedure: RIGHT MINIMALLY INVASIVE TOTAL KNEE ARTHROPLASTY;  Surgeon: Kurt Piper MD;  Location: [facility];  Service: Orthopedics         CURRENT MEDICATIONS:    No current facility-administered medications for this encounter.    Current Outpatient Medications:     apixaban (ELIQUIS) 5 mg Tab tablet, [APIXABAN (ELIQUIS) 5 MG TAB TABLET] Take 1 tablet (5 mg total) by mouth 2 (two) times a day., Disp: 180 tablet, Rfl: 0    carboxymethylcellulose PF (REFRESH PLUS) 0.5 % ophthalmic solution, Place 1 drop Into the left eye daily, Disp: , Rfl:     cholecalciferol, vitamin D3, 1,000 unit tablet, [CHOLECALCIFEROL, VITAMIN D3, 1,000 UNIT TABLET] Take 2,000 Units by mouth daily. , Disp: , Rfl:     furosemide (LASIX) 20 MG tablet, Take 0.5 tablets (10 mg) by mouth daily for 6 days, Disp: 3 tablet, Rfl: 0    latanoprost (XALATAN) 0.005 % ophthalmic solution, [LATANOPROST (XALATAN) 0.005 % OPHTHALMIC SOLUTION] Administer 1 drop to both eyes at bedtime., Disp: , Rfl:     losartan (COZAAR) 50 MG tablet, Take 50 mg by mouth daily, Disp: , Rfl:     meclizine (ANTIVERT) 25 MG tablet, Take 1 tablet (25 mg) by mouth 3 times daily as needed for dizziness or nausea, Disp: 21 tablet, Rfl: 0    omeprazole (PRILOSEC) 10 MG capsule, Take 1  capsule (10 mg) by mouth daily, Disp: , Rfl:     timolol maleate (TIMOPTIC) 0.5 % ophthalmic solution, Place 1 drop into the right eye every morning, Disp: , Rfl:     ALLERGIES:  Allergies   Allergen Reactions    Indocin [Indomethacin] Itching       FAMILY HISTORY:  Family History   Problem Relation Age of Onset    Pacemaker Father        SOCIAL HISTORY:   Social History     Socioeconomic History    Marital status:      Spouse name: None    Number of children: None    Years of education: None    Highest education level: None   Tobacco Use    Smoking status: Former     Current packs/day: 0.00     Types: Cigarettes     Quit date: 9/10/1988     Years since quittin.2    Smokeless tobacco: Never   Substance and Sexual Activity    Alcohol use: Yes     Alcohol/week: 28.0 standard drinks of alcohol     Comment: Alcoholic Drinks/day: 4 beers a day    Drug use: No     Social Drivers of Health      [facility]    Financial Resource Strain    Received from [facility], [facility]    Social Connections       VITALS:  Patient Vitals for the past 24 hrs:   BP Temp Temp src Pulse Resp SpO2 Height Weight   24 1312 (!) 143/64 97  F (36.1  C) Temporal 66 20 98 % 1.829 m (6') 106.1 kg (234 lb)        PHYSICAL EXAM    Constitutional:  Awake, alert, in mild apparent distress  HENT:  Normocephalic, Atraumatic. Bilateral external ears normal. Oropharynx moist. Nose normal. Neck- Normal range of motion with no guarding, No midline cervical tenderness, Supple, No stridor.   Eyes:  PERRL, EOMI with no signs of entrapment, Conjunctiva normal, No discharge.   Respiratory:  Normal breath sounds, No respiratory distress, No wheezing.    Cardiovascular:  Normal heart rate, occasional extrasystole with paced rhythm, No appreciable rubs or gallops.   GI:  Soft, No  tenderness, No distension, No palpable masses  Musculoskeletal:  No edema. Good range of motion in all major joints.  Mild superior patellar tenderness on the right with slight effusion.  Minimal laxity.  No warmth nor erythema.    Integument:  Warm, Dry, No erythema, No rash.   Neurologic:  Alert & oriented, Normal motor function, Normal sensory function, No focal deficits noted.   Psychiatric:  Affect normal, Judgment normal, Mood normal.     LAB:  All pertinent labs reviewed and interpreted.  Results for orders placed or performed during the hospital encounter of 12/03/24   ECG 12-LEAD WITH MUSE (LHE)   Result Value Ref Range    Systolic Blood Pressure  mmHg    Diastolic Blood Pressure  mmHg    Ventricular Rate 73 BPM    Atrial Rate 73 BPM    MN Interval  ms    QRS Duration 182 ms     ms    QTc 539 ms    P Axis  degrees    R AXIS -39 degrees    T Axis 122 degrees    Interpretation ECG       Undetermined rhythm  Left axis deviation  Left bundle branch block  Abnormal ECG  When compared with ECG of 13-Jul-2024 16:49,  Current undetermined rhythm precludes rhythm comparison, needs review  T wave inversion more evident in Lateral leads  QT has lengthened  Confirmed by SEE ED PROVIDER NOTE FOR, ECG INTERPRETATION ([id number]),  SERVANDO MAY ([id number]) on 12/3/2024 1:30:13 PM         RADIOLOGY:  Reviewed all pertinent imaging. Please see official radiology report.  XR Knee Right 3 Views    Result Date: 12/3/2024  EXAM: XR KNEE RIGHT 3 VIEWS LOCATION: [facility] DATE: 12/3/2024 INDICATION: Fall, effusion COMPARISON: None.     IMPRESSION: Status post right total knee arthroplasty. Acute nondisplaced periprosthetic fracture at the medial femoral condyle. Moderate knee joint effusion. There is normal joint alignment.      EKG:    Paced rhythm with rate of 60.  Frequent PVCs.  Underlying flutter waves.  When compared to July 13, 2024 entirely paced rhythm has been rate placed by intermittent  paced rhythm  I have independently reviewed and interpreted the EKG(s) documented above.          I, Yaakov Caldera, am serving as a scribe to document services personally performed by Diaz Glass MD, based on my observation and the provider's statements to me. I, Diaz Glass MD attest that Yaakov Caldera is acting in a scribe capacity, has observed my performance of the services and has documented them in accordance with my direction.    Diaz Glass M.D.  Emergency Medicine  [facility] EMERGENCY ROOM     Diaz Glass MD  12/03/24 1525       iDaz Glass MD  12/03/24 1527

## 2024-12-04 ENCOUNTER — APPOINTMENT (OUTPATIENT)
Dept: PHYSICAL THERAPY | Facility: CLINIC | Age: 84
End: 2024-12-04
Attending: HOSPITALIST
Payer: COMMERCIAL

## 2024-12-04 ENCOUNTER — APPOINTMENT (OUTPATIENT)
Dept: OCCUPATIONAL THERAPY | Facility: CLINIC | Age: 84
End: 2024-12-04
Attending: HOSPITALIST
Payer: COMMERCIAL

## 2024-12-04 LAB
ABO + RH BLD: NORMAL
BLD GP AB SCN SERPL QL: NEGATIVE
ERYTHROCYTE [DISTWIDTH] IN BLOOD BY AUTOMATED COUNT: 13.3 % (ref 10–15)
HCT VFR BLD AUTO: 34 % (ref 40–53)
HGB BLD-MCNC: 11.7 G/DL (ref 13.3–17.7)
HOLD SPECIMEN: NORMAL
MCH RBC QN AUTO: 34.2 PG (ref 26.5–33)
MCHC RBC AUTO-ENTMCNC: 34.4 G/DL (ref 31.5–36.5)
MCV RBC AUTO: 99 FL (ref 78–100)
PLATELET # BLD AUTO: 103 10E3/UL (ref 150–450)
RBC # BLD AUTO: 3.42 10E6/UL (ref 4.4–5.9)
SPECIMEN EXP DATE BLD: NORMAL
WBC # BLD AUTO: 5.8 10E3/UL (ref 4–11)

## 2024-12-04 PROCEDURE — 97530 THERAPEUTIC ACTIVITIES: CPT | Mod: GP

## 2024-12-04 PROCEDURE — 97535 SELF CARE MNGMENT TRAINING: CPT | Mod: GO

## 2024-12-04 PROCEDURE — G0378 HOSPITAL OBSERVATION PER HR: HCPCS

## 2024-12-04 PROCEDURE — 250N000013 HC RX MED GY IP 250 OP 250 PS 637: Performed by: STUDENT IN AN ORGANIZED HEALTH CARE EDUCATION/TRAINING PROGRAM

## 2024-12-04 PROCEDURE — 250N000013 HC RX MED GY IP 250 OP 250 PS 637: Performed by: HOSPITALIST

## 2024-12-04 PROCEDURE — 99232 SBSQ HOSP IP/OBS MODERATE 35: CPT | Performed by: HOSPITALIST

## 2024-12-04 PROCEDURE — 36415 COLL VENOUS BLD VENIPUNCTURE: CPT | Performed by: HOSPITALIST

## 2024-12-04 PROCEDURE — 97162 PT EVAL MOD COMPLEX 30 MIN: CPT | Mod: GP

## 2024-12-04 PROCEDURE — 85041 AUTOMATED RBC COUNT: CPT | Performed by: HOSPITALIST

## 2024-12-04 PROCEDURE — 97166 OT EVAL MOD COMPLEX 45 MIN: CPT | Mod: GO

## 2024-12-04 PROCEDURE — 85018 HEMOGLOBIN: CPT | Performed by: HOSPITALIST

## 2024-12-04 RX ORDER — ACETAMINOPHEN 325 MG/1
650 TABLET ORAL EVERY 4 HOURS PRN
Status: DISCONTINUED | OUTPATIENT
Start: 2024-12-04 | End: 2024-12-07 | Stop reason: HOSPADM

## 2024-12-04 RX ORDER — ACETAMINOPHEN 650 MG/1
650 SUPPOSITORY RECTAL EVERY 4 HOURS PRN
Status: DISCONTINUED | OUTPATIENT
Start: 2024-12-04 | End: 2024-12-07 | Stop reason: HOSPADM

## 2024-12-04 RX ADMIN — PANTOPRAZOLE SODIUM 20 MG: 20 TABLET, DELAYED RELEASE ORAL at 08:33

## 2024-12-04 RX ADMIN — Medication 200 MG: at 21:55

## 2024-12-04 RX ADMIN — SULFACETAMIDE SODIUM AND PREDNISOLONE SODIUM PHOSPHATE 6 DROP: 100; 2.3 SOLUTION/ DROPS OPHTHALMIC at 08:33

## 2024-12-04 RX ADMIN — LOSARTAN POTASSIUM 50 MG: 50 TABLET, FILM COATED ORAL at 08:33

## 2024-12-04 RX ADMIN — SULFACETAMIDE SODIUM AND PREDNISOLONE SODIUM PHOSPHATE 6 DROP: 100; 2.3 SOLUTION/ DROPS OPHTHALMIC at 20:04

## 2024-12-04 RX ADMIN — TIMOLOL MALEATE 1 DROP: 5 SOLUTION/ DROPS OPHTHALMIC at 08:34

## 2024-12-04 RX ADMIN — ACETAMINOPHEN 650 MG: 325 TABLET ORAL at 21:48

## 2024-12-04 RX ADMIN — APIXABAN 5 MG: 5 TABLET, FILM COATED ORAL at 20:04

## 2024-12-04 RX ADMIN — LATANOPROST 1 DROP: 50 SOLUTION/ DROPS OPHTHALMIC at 21:49

## 2024-12-04 RX ADMIN — APIXABAN 5 MG: 5 TABLET, FILM COATED ORAL at 12:58

## 2024-12-04 ASSESSMENT — ACTIVITIES OF DAILY LIVING (ADL)
ADLS_ACUITY_SCORE: 49
ADLS_ACUITY_SCORE: 52
ADLS_ACUITY_SCORE: 49
ADLS_ACUITY_SCORE: 49

## 2024-12-04 NOTE — PROGRESS NOTES
12/04/24 1312   Appointment Info   Signing Clinician's Name / Credentials (OT) Eric Marin OTR/L   Quick Adds   Quick Adds [facility] Auth & Certification   Living Environment   People in Home spouse   Current Living Arrangements house   Living Environment Comments Pt has FWW, tub shower, standard toilet w/ vanity on R side   Self-Care   Usual Activity Tolerance good   Current Activity Tolerance fair   Equipment Currently Used at Home none   Fall history within last six months yes   Number of times patient has fallen within last six months 1   Activity/Exercise/Self-Care Comment Pt typically IND w/ ADLs and IADLs at baseline   General Information   Onset of Illness/Injury or Date of Surgery 12/03/24   Referring Physician Kenny Jensen MD   Patient/Family Therapy Goal Statement (OT) get stronger   Additional Occupational Profile Info/Pertinent History of Current Problem Samantha Ramos is a 84 year old male admitted with a periprosthetic femur fracture.   Existing Precautions/Restrictions fall;weight bearing;other (see comments)   Limitations/Impairments hearing   Right Lower Extremity (Weight-bearing Status) (S)  toe touch weight-bearing (TTWB)  (w/ knee immobilizer on at all times)   Cognitive Status Examination   Orientation Status orientation to person, place and time   Affect/Mental Status (Cognitive) WNL   Sensory   Sensory Quick Adds sensation intact   Pain Assessment   Patient Currently in Pain Yes, see Vital Sign flowsheet   Posture   Posture not impaired   Range of Motion Comprehensive   Comment, General Range of Motion no R knee ROM - immobilizer   Strength Comprehensive (MMT)   General Manual Muscle Testing (MMT) Assessment no strength deficits identified   Bed Mobility   Bed Mobility supine-sit;sit-supine   Comment (Bed Mobility) CGA-Min A to support RLE   Transfers   Transfers bed-chair transfer;sit-stand transfer;toilet transfer   Transfer Comments CGA-Min A x2   Activities of Daily Living   BADL  Assessment/Intervention lower body dressing;bathing   Bathing Assessment/Intervention   [place] Level (Bathing) moderate assist (50% patient effort)   Lower Body Dressing Assessment/Training   [place] Level (Lower Body Dressing) moderate assist (50% patient effort)   Clinical Impression   Criteria for Skilled Therapeutic Interventions Met (OT) Yes, treatment indicated   OT Diagnosis decreased ADLs   Influenced by the following impairments R femur fx after fall   OT Problem List-Impairments impacting ADL activity tolerance impaired;mobility;range of motion (ROM);pain   Assessment of Occupational Performance 3-5 Performance Deficits   Identified Performance Deficits dressing, bed mobility, bathing, all transfers   Planned Therapy Interventions (OT) ADL retraining;bed mobility training;transfer training;strengthening   Clinical Decision Making Complexity (OT) detailed assessment/moderate complexity   Risk & Benefits of therapy have been explained evaluation/treatment results reviewed;patient;spouse/significant other;daughter   OT Total Evaluation Time   OT Eval, Moderate Complexity Minutes (92440) 15   Therapy Certification   Medical Diagnosis R femur fx   Start of Care Date 12/04/24   Certification date from 12/04/24   Certification date to 12/10/24   [facility] Authorization Information   Condition type Initial onset (within last 3 months)   Cause of current episode Traumatic   Nature of treatment Rehabilitative   Functional ability Severe functional limitations   Documented POC (choose all that apply) Measurable short and long term/discharge treatment goals related to physical and functional deficits.;Frequency of treatment visits and treatment activities to address deficit areas.;Patient agrees to program participation including home program   Briefly describe symptoms pain in RLE, poor activity tolerance   How did the symptoms start fall in kitchen   Average pain/intensity last 24 hours 5/10   Average  pain/intensity past week 3/10   Frequency of Symptoms Frequently (51-75% of the time)   Symptom impact on ADLs Quite a bit   Condition change since eval No change   General health reported by patient Good   OT Goals   Therapy Frequency (OT) Daily   OT Predicted Duration/Target Date for Goal Attainment 12/10/24   OT Goals Lower Body Dressing;Bed Mobility;Toilet Transfer/Toileting   OT: Lower Body Dressing Supervision/stand-by assist;using adaptive equipment;within precautions   OT: Bed Mobility Modified independent;supine to/from sitting;within precautions   OT: Toilet Transfer/Toileting Supervision/stand-by assist;toilet transfer;cleaning and garment management;within precautions   Interventions   Interventions Quick Adds Self-Care/Home Management   Self-Care/Home Management   Self-Care/Home Mgmt/ADL, Compensatory, Meal Prep Minutes (50851) 12   Symptoms Noted During/After Treatment (Meal Preparation/Planning Training) increased pain   Treatment Detail/Skilled Intervention Co-eval w/ PT. Pt seen in ED w/ knee immobilizer on RLE. Pt's daughter present for session and spouse joined later in session. Pt educ on TTWB and always wearing KI on RLE. Instructed pt on bed mobility techniques to sit EOB - pt completed w/ CGA-Min A to support RLE. Pt has good sitting balance once sitting up. Pt attempted LE dressing but will need Max A due to restricted ROM of R knee. Instructed pt to push up from bed for safe STS - completed w/ Min Ax2 and FWW. Pt able to maintain TTWB throughout session. Pt has good standing balance w/ FWW - able to take a few hops but fatigues quickly and needing to sit. Pt hopped 3 ft back to bed and sat on EOB. Min A to lift RLE back into bed and pt able to scoot self up in bed Mod I. Extra time discussing DC recs, DME, and barriers to completing ADLs at home.   OT Discharge Planning   OT Plan TTWB w/ KI RLE, bed mobility, LE dressing w/ AE, RTS pivot transfer   OT Discharge Recommendation (DC Rec) (S)   Transitional Care Facility   OT Rationale for DC Rec Pt not tolerating much activity due to TTWB restrictions. Pt has wife at home but she cannot offer much support for pt. Pt likely needs TCU to increase strength/activity tolerance for ADLs. If pt is to go home, pt will need RTS, reacher, sock aid and shower chair.   OT Brief overview of current status Min A x2 STS, Min A bed mobility, Max A LE dressing   OT Equipment Needed at Discharge dressing equipment;raised toilet seat;reacher;shower chair   Total Session Time   Timed Code Treatment Minutes 12   Total Session Time (sum of timed and untimed services) 27      [facility]  OUTPATIENT OCCUPATIONAL THERAPY  EVALUATION  PLAN OF TREATMENT FOR OUTPATIENT REHABILITATION  (COMPLETE FOR INITIAL CLAIMS ONLY)  Patient's Last Name, First Name, M.I.  YOB: 1940  Samantha Ramos                          Provider's Name  [facility] Medical Record No.  [id number]                             Onset Date:  12/03/24   Start of Care Date:  12/04/24   Type:     ___PT   _X_OT   ___SLP Medical Diagnosis:  R femur fx                    OT Diagnosis:  decreased ADLs Visits from SOC:  1     See note for plan of treatment, functional goals and certification details    I CERTIFY THE NEED FOR THESE SERVICES FURNISHED UNDER        THIS PLAN OF TREATMENT AND WHILE UNDER MY CARE     (Physician co-signature of this document indicates review and certification of the therapy plan).

## 2024-12-04 NOTE — PROGRESS NOTES
"S: Pt is an 84 yom seen today at [facility] ED, Room 19, for a right locked Tscope knee brace, ordered by MARIALUISA Liu CNP. Pt's wife and daughter were present during the fitting.  DX: Fracture of prosthetic knee  O: 6'0\". 234 lbs. Pt was wearing a soft knee immobilizer at the time of the visit.  A: Knee immobilizer was removed and pt was fit with a Post-Op TSCOPE Premier hinged knee brace on his right leg. Locked in extension. Donning/doffing and wear/care instructions were discussed with the pt, his wife, and daughter. 's written instructions for the brace were also provided.  P: All questions answered at this time. Follow-up  with orthotics PRN.  G: Maintain joint alignment, provide knee joint stability, promote healing of fx site, and increase the patient's ability to safely perform ADLs.    Electronically signed by Nichole Desir CPO, LPO  "

## 2024-12-04 NOTE — PROGRESS NOTES
"PRIMARY DIAGNOSIS: \"Rt Periprosthetic knee fracture \"  NURSING  OUTPATIENT/OBSERVATION GOALS TO BE MET BEFORE DISCHARGE:  ADLs back to baseline: No    Activity and level of assistance: Hasn't been up. Knee immobilizer ordered. Pt will be fit in to knee immobilizer and see by PT for activity eval.     Pain status: Improved with use of alternative comfort measures i.e.: distraction using TV.    Return to near baseline physical activity: Yes     Discharge Planner Nurse   Safe discharge environment identified: Yes  Barriers to discharge: No       Entered by: Lisa Hoffmann RN 12/04/2024 11:13 AM     Please review provider order for any additional goals.   Nurse to notify provider when observation goals have been met and patient is ready for discharge.  "

## 2024-12-04 NOTE — PROGRESS NOTES
[facility]    Medicine Progress Note - Hospitalist Service    Date of Admission:  12/3/2024    Assessment & Plan      Samantha Ramos is a 84 year old male admitted with a periprosthetic femur fracture.  No surgical intervention per orthopaedics. Plan for PT/OT evaluation to determine appropriate disposition.    # Periprosthetic knee fracture  # Right knee effusion, possible hemorrhagic  - PT/OT  - CBC  - apixaban on hold for now    # Afib    # JONATHAN  - CPAP    # HTN  - losartan    Addendum:  Discussed with ortho, ok to resume apixaban from their perspective.       Observation Goals: -diagnostic tests and consults completed and resulted, Nurse to notify provider when observation goals have been met and patient is ready for discharge.  Diet: Regular Diet Adult      Mora Catheter: Not present  Lines: None     Cardiac Monitoring: None  Code Status: Full Code      Clinically Significant Risk Factors Present on Admission                # Drug Induced Coagulation Defect: home medication list includes an anticoagulant medication    # Hypertension: Noted on problem list  # Chronic heart failure with preserved ejection fraction: heart failure noted on problem list and last echo with EF >50%          # Obesity: Estimated body mass index is 31.74 kg/m  as calculated from the following:    Height as of this encounter: 1.829 m (6').    Weight as of this encounter: 106.1 kg (234 lb).              Social Drivers of Health    Depression: At risk (11/14/2022)    [facility]    PHQ-2     PHQ-2 Score: 3   Tobacco Use: Medium Risk (12/3/2024)    Patient History     Smoking Tobacco Use: Former     Smokeless Tobacco Use: Never    [facility]    Financial Resource Strain    [facility]    Social  Connections          Disposition Plan     Medically Ready for Discharge: Anticipated Today             Kenny Jensen MD  Hospitalist Service  [facility]  Securely message with [employer] (more info)  Text page via [employer] Paging/Directory   ______________________________________________________________________    Interval History   Pain is a 2/10 in severity, most notable with movement.  Denies numbness or tingling in the legs.  Denies chest pain, chest pressure, or dyspnea.    Physical Exam   Vital Signs: Temp: 98.4  F (36.9  C) Temp src: Oral BP: 134/71 Pulse: 78   Resp: 22 SpO2: 96 % O2 Device: Nasal cannula Oxygen Delivery: 4 LPM  Weight: 234 lbs 0 oz    Gen:  lying in bed in no extremis  Neuro:  alert, conversant  CV: nl rate, regular rhythm  Pulm:  no acute resp distress, ctab anteriorly  MSK:  lateral aspect of right thigh with ecchymosis; right knee effusion, TTP on the anterior aspect of the distal right thigh just superior to the knee    Medical Decision Making             Data

## 2024-12-04 NOTE — PLAN OF CARE
Pt denies pain. Ortho consulted see notes. Makes needs known. Waiting for bed upstairs. Report given to Muna GAN.   Carmelita Guzman RN on 12/3/2024 at 7:07 PM   Problem: Pain Acute  Goal: Optimal Pain Control and Function  Outcome: Progressing  Intervention: Prevent or Manage Pain  Recent Flowsheet Documentation  Taken 12/3/2024 1803 by Carmelita Guzman, RN  Medication Review/Management: medications reviewed     Problem: Skin Injury Risk Increased  Goal: Skin Health and Integrity  Outcome: Progressing  Intervention: Plan: Nurse Driven Intervention: Moisture Management  Recent Flowsheet Documentation  Taken 12/3/2024 1803 by Carmelita Guzman, RN  Moisture Interventions: Encourage regular toileting   Goal Outcome Evaluation:

## 2024-12-04 NOTE — PROGRESS NOTES
[facility] Orthopaedics Progress Note          Subjective: Patient seen resting in bed, limited ambulation yet today. Pain to right knee mild at rest. Medial aspect of knee tender to palpation. No chest pain or shortness of breath. No nausea or vomiting.     Pain: minimal  Chest pain, SOB:  No      Objective:  Blood pressure 135/71, pulse 66, temperature 98  F (36.7  C), temperature source Oral, resp. rate 18, height 1.829 m (6'), weight 106.1 kg (234 lb), SpO2 97%.    Patient Vitals for the past 24 hrs:   BP Temp Temp src Pulse Resp SpO2   12/04/24 1145 135/71 98  F (36.7  C) Oral 66 18 97 %   12/04/24 0946 -- -- -- -- -- 96 %   12/04/24 0846 (!) 144/65 98.2  F (36.8  C) Oral 60 18 98 %   12/04/24 0830 -- -- -- -- -- 95 %   12/04/24 0800 -- -- -- 60 -- 96 %   12/04/24 0652 134/71 -- -- -- -- --   12/04/24 0600 -- -- -- 60 -- 96 %   12/04/24 0400 -- -- -- 65 -- 95 %   12/04/24 0339 (!) 159/70 98.4  F (36.9  C) Oral 78 22 96 %   12/04/24 0037 (!) 140/60 98.2  F (36.8  C) Oral 65 18 96 %   12/03/24 2000 139/62 98.2  F (36.8  C) Oral 67 20 97 %   12/03/24 1615 (!) 158/68 -- -- 91 -- 95 %   12/03/24 1600 (!) 156/89 -- -- 97 -- 97 %       Wt Readings from Last 4 Encounters:   12/03/24 106.1 kg (234 lb)   07/13/24 107 kg (236 lb)   12/31/23 105.7 kg (233 lb)   10/12/22 108.5 kg (239 lb 3.2 oz)       General: Alert and orientated, NAD  Respiratory: Non-labored breathing on RA  RLE motor function, sensation, and circulation intact   Yes, Dorsiflexion/plantarflexion intact and equal bilaterally. Moves all other extremities with ease and purpose   Calf tenderness: Bilateral  No    Pertinent Labs   Lab Results: personally reviewed.     Recent Labs   Lab Test 12/04/24  0816 12/03/24  1541 07/14/24  0508 07/13/24  1941 12/31/23  1103 10/11/22  0822 10/10/22  1215 11/06/21  0821 01/12/18  0543 01/11/18  1255   INR  --   --   --   --   --   --   --  1.14  --  1.02   WBC 5.8 7.3 4.9 6.7 7.9   < > 3.9* 5.9   < >  --    HGB 11.7*  12.8* 13.3 13.3 14.2   < > 14.0 14.2   < >  --    HCT 34.0* 37.2* 39.0* 38.8* 42.6   < > 41.7 42.2   < >  --    MCV 99 100 97 96 98   < > 99 97   < >  --    * 138* 137* 166 143*   < > 120* 167  --   --    NA  --   --  138 135 140   < > 136 139   < >  --    CRP  --   --   --   --   --   --  0.6  --   --   --     < > = values in this interval not displayed.       Plan:   Recommend hinged knee brace locked in extension to RLE  Anticoagulation protocol: OK to resume anticoagulation from an orthopedic standpoint  Pain medications:  Recommend multimodal approach with ice and Tylenol   Weight bearing status:  TTWB to RLE  Disposition:  Pending therapies and primary team. May require TCU at discharge   Continue cares and rehabilitation     Report completed by:  MARIALUISA Cabello CNP  Date: 12/4/2024  Time: 11:00 AM

## 2024-12-04 NOTE — PROGRESS NOTES
12/04/24 1310   Appointment Info   Signing Clinician's Name / Credentials (PT) Vijaya Nicholson, PT, DPT   Quick Adds   Quick Adds [facility] Auth & Certification   Living Environment   People in Home spouse   Current Living Arrangements house   Home Accessibility stairs to enter home;stairs within home   Number of Stairs, Main Entrance none   Number of Stairs, Within Home, Primary greater than 10 stairs  (Enters home at bottom of split level entry)   Living Environment Comments Able to stay on upper floor once inside home   Self-Care   Equipment Currently Used at Home none   Fall history within last six months yes   Number of times patient has fallen within last six months 1   Activity/Exercise/Self-Care Comment Has FWW   General Information   Onset of Illness/Injury or Date of Surgery 12/03/24   Referring Physician Dr. Kenny Jensen   Patient/Family Therapy Goals Statement (PT) be able to move safely   Pertinent History of Current Problem (include personal factors and/or comorbidities that impact the POC) Fall with R knee periprosthetic fx   Existing Precautions/Restrictions weight bearing;other (see comments)  (Knee immobilizer at all times)   Weight-Bearing Status - LLE full weight-bearing   Weight-Bearing Status - RLE toe touch weight-bearing   Range of Motion (ROM)   ROM Comment WFL   Bed Mobility   Bed Mobility supine-sit   Supine-Sit [place] (Bed Mobility) contact guard;verbal cues   Transfers   Transfers sit-stand transfer   Maintains Weight-bearing Status (Transfers) able to maintain   Sit-Stand Transfer   Sit-Stand [place] (Transfers) verbal cues;minimum assist (75% patient effort);2 person assist   Assistive Device (Sit-Stand Transfers) walker, front-wheeled   Gait/Stairs (Locomotion)   [place] Level (Gait) minimum assist (75% patient effort);2 person assist   Assistive Device (Gait) walker, front-wheeled   Distance in Feet (Gait) 3ft   Pattern (Gait) step-to   Deviations/Abnormal Patterns  (Gait) gait speed decreased  (flexed posture)   Maintains Weight-bearing Status (Gait) able to maintain   Clinical Impression   Criteria for Skilled Therapeutic Intervention Yes, treatment indicated   PT Diagnosis (PT) impaired functional mobility   Influenced by the following impairments weakness, pain, impaired balance   Functional limitations due to impairments gait, stairs, transfers   Clinical Presentation (PT Evaluation Complexity) stable   Clinical Presentation Rationale pt presents as medically diagnosed   Clinical Decision Making (Complexity) moderate complexity   Planned Therapy Interventions (PT) balance training;bed mobility training;gait training;home exercise program;patient/family education;stair training;strengthening;transfer training   Risk & Benefits of therapy have been explained care plan/treatment goals reviewed;patient   PT Total Evaluation Time   PT Eval, Moderate Complexity Minutes (35716) 10   Therapy Certification   Start of care date 12/04/24   Certification date from 12/04/24   Medical Diagnosis Fall with R periprosthetic knee fx   [facility] Authorization Information   Condition type Initial onset (within last 3 months)   Cause of current episode Traumatic   Nature of treatment Rehabilitative   Functional ability Severe functional limitations   Documented POC (choose all that apply) Patient agrees to program participation including home program;Frequency of treatment visits and treatment activities to address deficit areas.;Measurable short and long term/discharge treatment goals related to physical and functional deficits.   Briefly describe symptoms pain, decreased strength, decreased ROM, impaired balance   How did the symptoms start fall   Average pain/intensity last 24 hours 5/10   Average pain/intensity past week 5/10   Frequency of Symptoms Constantly (% of the time)   Symptom impact on ADLs Quite a bit   Condition change since eval N/A (first visit)   General health reported by  patient Good   Physical Therapy Goals   PT Frequency Daily   PT Predicted Duration/Target Date for Goal Attainment 12/11/24   PT Goals Transfers;Stairs;PT Goal 1   PT: Transfers Supervision/stand-by assist;Sit to/from stand;Assistive device   PT: Stairs 8 stairs;Minimal assist  (Butt bumping technique)   PT: Goal 1 Pivot transfers with FWW, SBA   Interventions   Interventions Quick Adds Gait Training;Therapeutic Activity   Therapeutic Activity   Therapeutic Activities: dynamic activities to improve functional performance Minutes (03662) 15   Symptoms Noted During/After Treatment Fatigue   Treatment Detail/Skilled Intervention Supine to sit with CGA, verbal cueing for use of UE to assist with R LE. Sitting balance on EOB x 10 minutes with SBA. Sit<>Stand with min assist of 2, verbal cueing for hand placement and safety, able to maintain WB. Demonstrated stand to sit with hand placement and LE positioning. Amb 3ft forward and 3ft back with FWW, min assist of 2, verbal cueing for posture and use of UEs for support. Sit to supine with SBA. Discussed stair situation and possibility of using butt bumping technique and needing increased assist to get up from floor due to TTWB and no knee ROM. Discussed use of wheelchair for mobility at home due to fatigue with mobility being TTWB. Family would like to investigate TCU options at this time, CM contacted   PT Discharge Planning   PT Plan Pivot transfers, stairs if going home   PT Discharge Recommendation (DC Rec) (S)  Transitional Care Facility   PT Rationale for DC Rec Patient needing assist of 2 for transfers and ambulation due to TTWB status. Patient has full flight of stairs to enter. TCU for mobility training, strengthening   PT Brief overview of current status Amb 3ft with FWW, min assist of 2   PT Equipment Needed at Discharge wheelchair;walker, rolling;gait belt   Physical Therapy Time and Intention   Timed Code Treatment Minutes 15   Total Session Time (sum of timed  and untimed services) 25   [facility]  OUTPATIENT PHYSICAL THERAPY EVALUATION  PLAN OF TREATMENT FOR OUTPATIENT REHABILITATION  (COMPLETE FOR INITIAL CLAIMS ONLY)  Patient's Last Name, First Name, M.I.  YOB: 1940  Samantha Ramos                        Provider's Name  [facility] Medical Record No.  [id number]                             Onset Date:  12/03/24   Start of Care Date:  12/04/24   Type:     _X_PT   ___OT   ___SLP Medical Diagnosis:  Fall with R periprosthetic knee fx              PT Diagnosis:  impaired functional mobility Visits from SOC:  1     See note for plan of treatment, functional goals and certification details    I CERTIFY THE NEED FOR THESE SERVICES FURNISHED UNDER        THIS PLAN OF TREATMENT AND WHILE UNDER MY CARE     (Physician co-signature of this document indicates review and certification of the therapy plan).

## 2024-12-04 NOTE — PROGRESS NOTES
Report received from ED nurse, introduced self to patient, appears A&O X4, is vitally stable on room air. Initial assessment done ( See flow sheets), PIV flushed and saline locked. Bed locked in low position, white board updated.  Nursing patient education done on use of call light. Call light within reach, patient acknowledges understanding.      03:40  Blood pressure (!) 159/70, pulse 78, temperature 98.4  F (36.9  C), resp. rate 22, height 1.829 m (6'), weight 106.1 kg (234 lb), SpO2 96%.   Patient is A&O X4 is afebrile, non-toxic appearing and is interactive. Denies pain, SOB, nausea, chest tightness, dizziness or GI symptoms. Q4H neurochecks done. CMS intact. No pronator drift, Dorsiflexion good. Right knee is sore but can extend and bend to a certain degree. Patient advised to maintain a straight gait with TTWB as tolerated, ROM exercises revisited.     04:00  Heart and Lung sounds are clear, diffused throughout. No coughing, S1/S2 normal. Patient back in bed, CPAP connected.     [id number] hrs   MALIK Alvarenga

## 2024-12-04 NOTE — PROGRESS NOTES
"PRIMARY DIAGNOSIS: \"GENERIC\" NURSING  OUTPATIENT/OBSERVATION GOALS TO BE MET BEFORE DISCHARGE:  ADLs back to baseline: Yes    Activity and level of assistance: Up with standby assistance.    Pain status: Improved-controlled with oral pain medications.    Return to near baseline physical activity: Yes     Discharge Planner Nurse   Safe discharge environment identified: Yes  Barriers to discharge:  Awaiting MD reevaluation prior to discharge       Entered by: Lyle Méndez RN 12/04/2024 12:49 AM     Please review provider order for any additional goals.   Nurse to notify provider when observation goals have been met and patient is ready for discharge.  "

## 2024-12-04 NOTE — PROGRESS NOTES
"PRIMARY DIAGNOSIS: \"Rt Periprosthetic knee fracture \" NURSING  OUTPATIENT/OBSERVATION GOALS TO BE MET BEFORE DISCHARGE:  ADLs back to baseline: No    Activity and level of assistance: : hasn't been up. Awaiting for brace fit by [facility] team and PT eval.     Pain status: Improved with use of alternative comfort measures i.e.: rest    Return to near baseline physical activity: No     Discharge Planner Nurse   Safe discharge environment identified: Yes  Barriers to discharge: Yes: [facility] to see patient to place brace and PT eval required for activity.        Entered by: Lisa Hoffmann RN 12/04/2024 9:58 AM     Please review provider order for any additional goals.   Nurse to notify provider when observation goals have been met and patient is ready for discharge.    Pt A/O x4. On RA during day; uses CPAP at [facility]. VSS. Pain (right knee) controlled without medications. RLE CMS intact. Call light with in reach.   "

## 2024-12-04 NOTE — PLAN OF CARE
Problem: Adult Inpatient Plan of Care  Goal: Plan of Care Review  Description: The Plan of Care Review/Shift note should be completed every shift.  The Outcome Evaluation is a brief statement about your assessment that the patient is improving, declining, or no change.  This information will be displayed automatically on your shift  note.  Outcome: Progressing    Pt denies pain this shift. Up A:1 with cane, tolerating well. VSS. Pt calls appropriately and makes needs known, call light within reach.

## 2024-12-05 VITALS
SYSTOLIC BLOOD PRESSURE: 157 MMHG | RESPIRATION RATE: 16 BRPM | HEIGHT: 72 IN | OXYGEN SATURATION: 96 % | HEART RATE: 70 BPM | DIASTOLIC BLOOD PRESSURE: 70 MMHG | BODY MASS INDEX: 31.69 KG/M2 | TEMPERATURE: 98.7 F | WEIGHT: 234 LBS

## 2024-12-05 LAB — HGB BLD-MCNC: 11.8 G/DL (ref 13.3–17.7)

## 2024-12-05 PROCEDURE — 99232 SBSQ HOSP IP/OBS MODERATE 35: CPT | Performed by: HOSPITALIST

## 2024-12-05 PROCEDURE — 999N000157 HC STATISTIC RCP TIME EA 10 MIN

## 2024-12-05 PROCEDURE — 250N000013 HC RX MED GY IP 250 OP 250 PS 637: Performed by: HOSPITALIST

## 2024-12-05 PROCEDURE — 85018 HEMOGLOBIN: CPT | Performed by: NURSE PRACTITIONER

## 2024-12-05 PROCEDURE — G0378 HOSPITAL OBSERVATION PER HR: HCPCS

## 2024-12-05 PROCEDURE — 94660 CPAP INITIATION&MGMT: CPT

## 2024-12-05 PROCEDURE — 36415 COLL VENOUS BLD VENIPUNCTURE: CPT | Performed by: NURSE PRACTITIONER

## 2024-12-05 RX ADMIN — Medication 200 MG: at 21:00

## 2024-12-05 RX ADMIN — PANTOPRAZOLE SODIUM 20 MG: 20 TABLET, DELAYED RELEASE ORAL at 08:32

## 2024-12-05 RX ADMIN — SULFACETAMIDE SODIUM AND PREDNISOLONE SODIUM PHOSPHATE 6 DROP: 100; 2.3 SOLUTION/ DROPS OPHTHALMIC at 20:20

## 2024-12-05 RX ADMIN — TIMOLOL MALEATE 1 DROP: 5 SOLUTION/ DROPS OPHTHALMIC at 08:31

## 2024-12-05 RX ADMIN — SULFACETAMIDE SODIUM AND PREDNISOLONE SODIUM PHOSPHATE 6 DROP: 100; 2.3 SOLUTION/ DROPS OPHTHALMIC at 08:31

## 2024-12-05 RX ADMIN — LOSARTAN POTASSIUM 50 MG: 50 TABLET, FILM COATED ORAL at 08:31

## 2024-12-05 RX ADMIN — APIXABAN 5 MG: 5 TABLET, FILM COATED ORAL at 08:32

## 2024-12-05 RX ADMIN — APIXABAN 5 MG: 5 TABLET, FILM COATED ORAL at 20:59

## 2024-12-05 RX ADMIN — LATANOPROST 1 DROP: 50 SOLUTION/ DROPS OPHTHALMIC at 21:00

## 2024-12-05 ASSESSMENT — ACTIVITIES OF DAILY LIVING (ADL)
ADLS_ACUITY_SCORE: 49
ADLS_ACUITY_SCORE: 54
ADLS_ACUITY_SCORE: 52
ADLS_ACUITY_SCORE: 52
ADLS_ACUITY_SCORE: 54
ADLS_ACUITY_SCORE: 54
ADLS_ACUITY_SCORE: 49
ADLS_ACUITY_SCORE: 49
ADLS_ACUITY_SCORE: 52
ADLS_ACUITY_SCORE: 54
ADLS_ACUITY_SCORE: 54
ADLS_ACUITY_SCORE: 52
ADLS_ACUITY_SCORE: 54
ADLS_ACUITY_SCORE: 52
ADLS_ACUITY_SCORE: 49
ADLS_ACUITY_SCORE: 52
ADLS_ACUITY_SCORE: 52
ADLS_ACUITY_SCORE: 54
ADLS_ACUITY_SCORE: 54
ADLS_ACUITY_SCORE: 52
ADLS_ACUITY_SCORE: 49
ADLS_ACUITY_SCORE: 54
ADLS_ACUITY_SCORE: 52

## 2024-12-05 NOTE — PROGRESS NOTES
"PRIMARY DIAGNOSIS: \"GENERIC\" NURSING  OUTPATIENT/OBSERVATION GOALS TO BE MET BEFORE DISCHARGE:  ADLs back to baseline: No    Activity and level of assistance: Up with standby assistance.    Pain status: Improved with use of alternative comfort measures i.e.: ice    Return to near baseline physical activity: No     Discharge Planner Nurse   Safe discharge environment identified: No  Barriers to discharge: Yes       Entered by: Gregoria Pringle RN 12/05/2024 1:11 PM     Please review provider order for any additional goals.   Nurse to notify provider when observation goals have been met and patient is ready for discharge.  "

## 2024-12-05 NOTE — PROGRESS NOTES
[facility] Orthopaedics Progress Note      Subjective: Patient seen resting in bed, wife and daughter at bedside. Pain has remained tolerable, tender to palpation.  No chest pain or shortness of breath. No nausea or vomiting. Reports sensation changes to right foot predominately to right heal, feels it is from brace being too tight.     Pain: minimal  Chest pain, SOB:  No      Objective:  Blood pressure (!) 142/62, pulse 64, temperature 98  F (36.7  C), temperature source Oral, resp. rate 22, height 1.829 m (6'), weight 106.1 kg (234 lb), SpO2 96%.    Patient Vitals for the past 24 hrs:   BP Temp Temp src Pulse Resp SpO2   12/05/24 0748 (!) 142/62 98  F (36.7  C) Oral -- -- --   12/05/24 0740 (!) 151/71 98.3  F (36.8  C) Oral 64 22 96 %   12/05/24 0401 137/65 97.9  F (36.6  C) Axillary 65 18 98 %   12/05/24 0015 129/58 98.4  F (36.9  C) Axillary 60 18 95 %   12/04/24 1954 134/64 98.1  F (36.7  C) Oral 55 16 95 %       Wt Readings from Last 4 Encounters:   12/03/24 106.1 kg (234 lb)   07/13/24 107 kg (236 lb)   12/31/23 105.7 kg (233 lb)   10/12/22 108.5 kg (239 lb 3.2 oz)       General: Alert and orientated, NAD  Respiratory: Non-labored breathing on RA  RLE motor function, sensation, and circulation intact   Yes, Dorsiflexion/plantarflexion intact and equal bilaterally. Moves all other extremities with ease and purpose   Calf tenderness: Bilateral  No    Pertinent Labs   Lab Results: personally reviewed.     Recent Labs   Lab Test 12/04/24  0816 12/03/24  1541 07/14/24  0508 07/13/24  1941 12/31/23  1103 10/11/22  0822 10/10/22  1215 11/06/21  0821 01/12/18  0543 01/11/18  1255   INR  --   --   --   --   --   --   --  1.14  --  1.02   WBC 5.8 7.3 4.9 6.7 7.9   < > 3.9* 5.9   < >  --    HGB 11.7* 12.8* 13.3 13.3 14.2   < > 14.0 14.2   < >  --    HCT 34.0* 37.2* 39.0* 38.8* 42.6   < > 41.7 42.2   < >  --    MCV 99 100 97 96 98   < > 99 97   < >  --    * 138* 137* 166 143*   < > 120* 167  --   --    NA  --    --  138 135 140   < > 136 139   < >  --    CRP  --   --   --   --   --   --  0.6  --   --   --     < > = values in this interval not displayed.       Plan:   Hgb stable at 11.8  Recommend change to hinged knee brace locked in extension to RLE and monitor right foot CMS  Anticoagulation protocol: OK to resume anticoagulation from an orthopedic standpoint  Pain medications:  Recommend multimodal approach with ice and Tylenol   Weight bearing status:  TTWB to RLE  Disposition:  Pending therapies and primary team. May require TCU at discharge. Recommend follow up with [facility] 1-2 weeks. Discussed with daughter to call and schedule appointment.   Continue cares and rehabilitation     Report completed by:  MARIALUISA Cabello CNP  Date: 12/5/2024  Time: 11:00 AM

## 2024-12-05 NOTE — PLAN OF CARE
Problem: Skin Injury Risk Increased  Goal: Skin Health and Integrity  12/5/2024 1054 by Gregoria Pringle RN  Outcome: Progressing  12/5/2024 1047 by Gregoria Pringle RN  Outcome: Progressing  Intervention: Plan: Nurse Driven Intervention: Moisture Management  Recent Flowsheet Documentation  Taken 12/5/2024 0815 by Gregoria Pringle RN  Moisture Interventions: Encourage regular toileting  Taken 12/5/2024 0800 by Gregoria Pringle RN  Moisture Interventions: Encourage regular toileting  Bathing/Skin Care: family refused  Intervention: Optimize Skin Protection  Recent Flowsheet Documentation  Taken 12/5/2024 0815 by Gregoria Pringle RN  Activity Management: activity adjusted per tolerance  Head of Bed (HOB) Positioning: HOB at 20-30 degrees     Problem: Pain Acute  Goal: Optimal Pain Control and Function  12/5/2024 1054 by Gregoria Pringle RN  Outcome: Progressing  12/5/2024 1047 by Gregoria Pringle RN  Outcome: Progressing  Intervention: Develop Pain Management Plan  Recent Flowsheet Documentation  Taken 12/5/2024 0815 by Gregoria Pringle RN  Pain Management Interventions:   cold applied   rest  Intervention: Prevent or Manage Pain  Recent Flowsheet Documentation  Taken 12/5/2024 0815 by Gregoria Pringle RN  Medication Review/Management: medications reviewed     Problem: Fall Injury Risk  Goal: Absence of Fall and Fall-Related Injury  12/5/2024 1054 by Gregoria Pringle RN  Outcome: Progressing  12/5/2024 1047 by Gregoria Pringle RN  Outcome: Progressing  Intervention: Identify and Manage Contributors  Recent Flowsheet Documentation  Taken 12/5/2024 0815 by Gregoria Pringle RN  Medication Review/Management: medications reviewed  Intervention: Promote Injury-Free Environment  Recent Flowsheet Documentation  Taken 12/5/2024 0815 by Gregoria Pringle RN  Safety Promotion/Fall Prevention:   assistive device/personal items within reach   clutter free environment maintained   activity supervised   nonskid shoes/slippers when out of bed    room near nurse's station   room organization consistent   safety round/check completed   Goal Outcome Evaluation: a/o x 4. Vitally stable on room air. States he has discomfort in right knee when at resr 2/10, and increased pain (4/10) with activity. Right knee is swollen and tender. CMS intact in lower extremities. PIV removed per daughters request. A1 with walker for ambulation and activity. Pending TCU placement.

## 2024-12-05 NOTE — PROGRESS NOTES
[facility]    Medicine Progress Note - Hospitalist Service    Date of Admission:  12/3/2024    Assessment & Plan   Samantha Ramos is a 84 year old male admitted with a periprosthetic femur fracture.  No surgical intervention per orthopaedics.  Sensation grossly intact at right foot/ankle, DP pulse present, and foot is warm.  Discussed right ankle numbness with orthopaedics.  Medically ready for discharge, awaiting appropriate disposition.    # Periprosthetic knee fracture  - TCU    # Afib  - apixaban    # JONATHAN  - CPAP    # HTN  - losartan         Observation Goals: -diagnostic tests and consults completed and resulted, Nurse to notify provider when observation goals have been met and patient is ready for discharge.  Diet: Regular Diet Adult      Mora Catheter: Not present  Lines: None     Cardiac Monitoring: None  Code Status: Full Code      Clinically Significant Risk Factors Present on Admission                # Drug Induced Coagulation Defect: home medication list includes an anticoagulant medication  # Thrombocytopenia: Lowest platelets = 103 in last 2 days, will monitor for bleeding   # Hypertension: Noted on problem list  # Chronic heart failure with preserved ejection fraction: heart failure noted on problem list and last echo with EF >50%          # Obesity: Estimated body mass index is 31.74 kg/m  as calculated from the following:    Height as of this encounter: 1.829 m (6').    Weight as of this encounter: 106.1 kg (234 lb).              Social Drivers of Health    Depression: At risk (11/14/2022)    [facility]    PHQ-2     PHQ-2 Score: 3   Tobacco Use: Medium Risk (12/3/2024)    Patient History     Smoking Tobacco Use: Former     Smokeless Tobacco Use: Never    [facility]    Financial Resource Strain    [facility] [facility], [facility] [address]    Social Connections          Disposition Plan     Medically Ready for Discharge: Ready Now             Kenny Jensen MD  Hospitalist Service  [facility]  Securely message with Maria (more info)  Text page via [employer] Paging/Directory   ______________________________________________________________________    Interval History   Reports pain, which is worse with touch or movement.  Initially denies having numbness or tingling in the legs currently.  Subsequently reports numbness in the right ankle.  Had foot numbness last night as well.    Physical Exam   Vital Signs: Temp: 98  F (36.7  C) Temp src: Oral BP: (!) 142/62 Pulse: 64   Resp: 22 SpO2: 96 % O2 Device: BiPAP/CPAP Oxygen Delivery: 4 LPM  Weight: 234 lbs 0 oz    Gen:  lying in bed in no extremis  Neuro:  alert, conversant; moves toes and sensation grossly intact in both feet; sensation grossly intact at right ankle  CV:  nl rate, regular rhythm; right foot warm with DP pulse intact  Pulm:  no acute resp distress, ctab anteriorly    Medical Decision Making             Data

## 2024-12-05 NOTE — PROGRESS NOTES
"S: Pt is an 84 yom seen today at [facility] ED, Room 19, for an adjustment to his right locked Tscope knee brace. Pt's wife and daughter were present. Family was concerned that the brace was not put on the same way today as it was yesterday.  DX: Fracture of prosthetic knee  O: 6' 0\". 234 lbs. Distal straps on brace were not secured properly.  A: I re-secured the distal two straps with velcro (pull straps past the upright of the brace). Family was re-educated on donning/doffing procedures.  P: Follow-up with orthotics PRN.    Electronically signed by Nichole Desir CPO, PRITI  "

## 2024-12-05 NOTE — PLAN OF CARE
Problem: Adult Inpatient Plan of Care  Goal: Absence of Hospital-Acquired Illness or Injury  Intervention: Identify and Manage Fall Risk  Recent Flowsheet Documentation  Taken 12/4/2024 1130 by Lisa Hoffmann RN  Safety Promotion/Fall Prevention:   clutter free environment maintained   nonskid shoes/slippers when out of bed   patient and family education   room near nurse's station   safety round/check completed  Taken 12/4/2024 0830 by Lisa Hoffmann RN  Safety Promotion/Fall Prevention:   clutter free environment maintained   nonskid shoes/slippers when out of bed   patient and family education   room near nurse's station   safety round/check completed  Intervention: Prevent Skin Injury  Recent Flowsheet Documentation  Taken 12/4/2024 1130 by Lisa Hoffmann RN  Body Position: position changed independently  Skin Protection: adhesive use limited  Taken 12/4/2024 0830 by Lisa Hoffmann RN  Body Position: position changed independently  Skin Protection: adhesive use limited  Goal: Optimal Comfort and Wellbeing  Outcome: Progressing  Goal: Readiness for Transition of Care  Outcome: Progressing   Goal Outcome Evaluation:

## 2024-12-05 NOTE — PROGRESS NOTES
"PRIMARY DIAGNOSIS:  \"Rt Periprosthetic knee fracture \" NURSING  OUTPATIENT/OBSERVATION GOALS TO BE MET BEFORE DISCHARGE:  ADLs back to baseline: Yes    Activity and level of assistance: Up with standby assistance. Required walker and right knee immobilizer at all times.    Pain status: Improved with use of alternative comfort measures i.e.: ice    Return to near baseline physical activity: Yes. With walker and right knee immobilizer.     Discharge Planner Nurse   Safe discharge environment identified: Yes. TCU; SW is consulted.  Barriers to discharge: Yes: Awaiting for SW follow up for placement.        Entered by: Lisa Hoffmann RN 12/04/2024 6:05 PM     Please review provider order for any additional goals.   Nurse to notify provider when observation goals have been met and patient is ready for discharge.  "

## 2024-12-05 NOTE — PLAN OF CARE
"PRIMARY DIAGNOSIS: \"GENERIC\" NURSING  OUTPATIENT/OBSERVATION GOALS TO BE MET BEFORE DISCHARGE:  ADLs back to baseline: No    Activity and level of assistance: Up with standby assistance.    Pain status: Improved-controlled with oral pain medications.    Return to near baseline physical activity: No     Discharge Planner Nurse   Safe discharge environment identified: No  Barriers to discharge: Yes       Entered by: Dennise Smith RN 12/04/2024 11:24 PM       A&Ox4. VSS, on RA with CPAP at night. C/o pain at a \"4\" in right knee. Tylenol effective. Brace on at all times. Using ice too on knee. CMS intact. Right foot a little weaker than left with dorsi/plantar flexion. Up A1. Using urinal in bed. PIV SL. Discharge pending, PT recommending TCU.    Please review provider order for any additional goals.   Nurse to notify provider when observation goals have been met and patient is ready for discharge.Goal Outcome Evaluation:                        "

## 2024-12-05 NOTE — UTILIZATION REVIEW
Concurrent stay review; Secondary Review Determination - [facility]        Under the authority of the Utilization Management Committee, the utilization review process indicated a secondary review on the above patient.  The review outcome is based on review of the medical records, discussions with staff, and applying clinical experience noted on the date of the review.        (x) Outpatient [facility] status is appropriate       RATIONALE FOR DETERMINATION:   84-year-old male admitted with periprosthetic femur fracture with no surgical intervention required per orthopedics.  Also with history of atrial fibrillation on apixaban, JONATHAN on CPAP and high blood pressure.  Currently medically ready for discharge and awaiting appropriate TCU disposition.    Patient delayed discharge is related to disposition, there is no medical necessity for inpatient admission at the time of this review. If there is a change in patient status, please resend for review.    The information on this document is developed by the utilization review team in order for the business office to ensure compliance.  This only denotes the appropriateness of proper admission status and does not reflect the quality of care rendered.       The definitions of Inpatient Status and Observation Status used in making the determination above are those provided in the CMS Coverage Manual, Chapter 1 and Chapter 6, section 70.4.       Sincerely,    Ladarius Tavarez MD

## 2024-12-05 NOTE — PROGRESS NOTES
PRIMARY DIAGNOSIS:  Fracture of right prosthetic knee  OUTPATIENT/OBSERVATION GOALS TO BE MET BEFORE DISCHARGE:  ADLs back to baseline: No    Activity and level of assistance: Up with standby assistance.    Pain status: Improved with use of alternative comfort measures i.e.: ice and rest    Return to near baseline physical activity: No     Discharge Planner Nurse   Safe discharge environment identified: No  Barriers to discharge: Yes       Entered by: Wendy Connell RN 12/05/2024 2:56 AM     Please review provider order for any additional goals.   Nurse to notify provider when observation goals have been met and patient is ready for discharge.    VSS on CPAP. Reporting mild pain in right knee, using ice to manage. CMS intact. Brace in place on RLE. Using urinal in bed overnight.

## 2024-12-05 NOTE — PROGRESS NOTES
PRIMARY DIAGNOSIS: Fracture of prosthetic knee  OUTPATIENT/OBSERVATION GOALS TO BE MET BEFORE DISCHARGE:  ADLs back to baseline: No    Activity and level of assistance: Up with standby assistance. Ax1 and walker    Pain status: Improved with use of alternative comfort measures i.e.: ice    Return to near baseline physical activity: No     Discharge Planner Nurse   Safe discharge environment identified: No  Barriers to discharge: Yes       Entered by: Wendy Connell RN 12/05/2024 1:29 AM     Please review provider order for any additional goals.   Nurse to notify provider when observation goals have been met and patient is ready for discharge.    Pt is AxO x4. VSS on CPAP overnight. Reporting 2/10 pain in right knee, ice in use. Knee brace in place. CMS intact. Therapy recommending discharge to TCU.

## 2024-12-05 NOTE — CONSULTS
Care Management Initial Consult      General Information  Assessment completed with: Children, Daughter Margo  Type of CM/SW Visit: Initial Assessment  Primary Care Provider verified and updated as needed: Yes   Readmission within the last 30 days: no previous admission in last 30 days   Reason for Consult: discharge planning, facility placement  Advance Care Planning: Advance Care Planning Reviewed: concerns discussed  HCD requested from daughter     Communication Assessment  Patient's communication style: spoken language (English or Bilingual)        Cognitive  Cognitive/Neuro/Behavioral: WDL                      Living Environment:   People in home: spouse     Current living Arrangements: house      Able to return to prior arrangements: no     Family/Social Support:  Care provided by: self, child(brando)  Provides care for: spouse  Marital Status:   Support system: Wife, Children  Geri       Description of Support System: Supportive, Involved (As needed/able)    Support Assessment: Lacks adequate physical care, Lacks necessary supervision and assistance (In current condition)    Current Resources:   Patient receiving home care services: No  Community Resources: None  Equipment currently used at home: cane, straight  Supplies currently used at home: Other (CPAP and associated tubing)    Employment/Financial:  Employment Status: retired     Financial Concerns: none   Referral to Financial Worker: No     Does the patient's insurance plan have a 3 day qualifying hospital stay waiver?  No    Lifestyle & Psychosocial Needs:  Social Drivers of Health     Food Insecurity: Not on file   Depression: At risk (11/14/2022)    [facility]    PHQ-2     PHQ-2 Score: 3   Housing Stability: Not on file   Tobacco Use: Medium Risk (12/3/2024)    Patient History     Smoking Tobacco Use: Former     Smokeless Tobacco Use: Never     Passive Exposure: Not on file   Financial Resource Strain: High  Risk (12/27/2021)    [facility], [facility]    Financial Resource Strain     Difficulty of Paying Living Expenses: Not on file     Difficulty of Paying Living Expenses: Not on file   Alcohol Use: Not on file   Transportation Needs: Not on file   Physical Activity: Not on file   Interpersonal Safety: Not on file   Stress: Not on file   Social Connections: Unknown (12/27/2021)    [facility], [facility]    Social Connections     Frequency of Communication with Friends and Family: Not on file   Health Literacy: Not on file     Functional Status:  Prior to admission patient needed assistance:   Dependent ADLs:: Ambulation-cane, Dressing, Transfers, Toileting, Bathing (In current condition. Independent prior to injury)  Dependent IADLs:: Shopping, Laundry, Cooking, Cleaning, Meal Preparation, Medication Management, Transportation (In current condition. Independent prior to injury)  Assessment of Functional Status: Needs placement in a SNF/TCF for rehabilitation, Not at  functional baseline    Mental Health Status:  Mental Health Status: No Current Concerns       Chemical Dependency Status:  Chemical Dependency Status: No Current Concerns           Values/Beliefs:  Spiritual, Cultural Beliefs, Confucianism Practices, Values that affect care: no             Discussed  Partnership in Safe Discharge Planning  document with patient/family: No    Additional Information:  Writer spoke with pt's daughter Margo by phone to introduce Care Management(CM), obtain an initial assessment, and offer discharge support. Pt resides in a house with his wife who he cares for. Pt was reportedly I/ADL independent prior to injury and now non weight bearing status on affected leg. Pt has received recommendations for TCU transfer and is in agreement with such. Pt/family was provided with  "the Medicare Compare list for SNF.  Discussed associated Medicare star ratings to assist with choice for referrals/discharge planning Yes. Education was given to pt/family that star ratings are updated/maintained by Medicare and can be reviewed by visiting www.medicare.gov Yes - Initial preferences of [facility] and [facility] were provided by Margo - referrals placed. [facility] Auth will need to be completed by the TCU.    12/5 per RIVERA Bright.-\"84 year old male admitted with a periprosthetic femur fracture.  No surgical intervention per orthopaedics.  Sensation grossly intact at right foot/ankle, DP pulse present, and foot is warm.  Medically ready for discharge, awaiting appropriate disposition.\"    Next Steps:   TCU referrals pending. CM to follow-up on TCU referrals and continue to assist with placement process as indicated. Wheelchair transport discussed and agreed upon with daughter Margo.    Ceferino Varner RN      "

## 2024-12-05 NOTE — PROGRESS NOTES
"PRIMARY DIAGNOSIS: \"GENERIC\" NURSING  OUTPATIENT/OBSERVATION GOALS TO BE MET BEFORE DISCHARGE:  ADLs back to baseline: No    Activity and level of assistance: Up with standby assistance. A1 with walker    Pain status: Improved with use of alternative comfort measures i.e.: ice    Return to near baseline physical activity: No     Discharge Planner Nurse   Safe discharge environment identified: No  Barriers to discharge: Yes       Entered by: Gregoria Pringle RN 12/05/2024 10:52 AM     Please review provider order for any additional goals.   Nurse to notify provider when observation goals have been met and patient is ready for discharge.  "

## 2024-12-06 ENCOUNTER — APPOINTMENT (OUTPATIENT)
Dept: PHYSICAL THERAPY | Facility: CLINIC | Age: 84
End: 2024-12-06
Payer: COMMERCIAL

## 2024-12-06 ENCOUNTER — APPOINTMENT (OUTPATIENT)
Dept: OCCUPATIONAL THERAPY | Facility: CLINIC | Age: 84
End: 2024-12-06
Payer: COMMERCIAL

## 2024-12-06 PROCEDURE — 99232 SBSQ HOSP IP/OBS MODERATE 35: CPT | Performed by: HOSPITALIST

## 2024-12-06 PROCEDURE — G0378 HOSPITAL OBSERVATION PER HR: HCPCS

## 2024-12-06 PROCEDURE — 250N000013 HC RX MED GY IP 250 OP 250 PS 637: Performed by: STUDENT IN AN ORGANIZED HEALTH CARE EDUCATION/TRAINING PROGRAM

## 2024-12-06 PROCEDURE — 250N000013 HC RX MED GY IP 250 OP 250 PS 637: Performed by: HOSPITALIST

## 2024-12-06 PROCEDURE — 97535 SELF CARE MNGMENT TRAINING: CPT | Mod: GO

## 2024-12-06 PROCEDURE — 97530 THERAPEUTIC ACTIVITIES: CPT | Mod: GP

## 2024-12-06 PROCEDURE — 97110 THERAPEUTIC EXERCISES: CPT | Mod: GP

## 2024-12-06 RX ADMIN — SULFACETAMIDE SODIUM AND PREDNISOLONE SODIUM PHOSPHATE 6 DROP: 100; 2.3 SOLUTION/ DROPS OPHTHALMIC at 21:27

## 2024-12-06 RX ADMIN — PANTOPRAZOLE SODIUM 20 MG: 20 TABLET, DELAYED RELEASE ORAL at 08:55

## 2024-12-06 RX ADMIN — LATANOPROST 1 DROP: 50 SOLUTION/ DROPS OPHTHALMIC at 21:27

## 2024-12-06 RX ADMIN — ACETAMINOPHEN 650 MG: 325 TABLET ORAL at 21:27

## 2024-12-06 RX ADMIN — SULFACETAMIDE SODIUM AND PREDNISOLONE SODIUM PHOSPHATE 6 DROP: 100; 2.3 SOLUTION/ DROPS OPHTHALMIC at 08:56

## 2024-12-06 RX ADMIN — APIXABAN 5 MG: 5 TABLET, FILM COATED ORAL at 08:55

## 2024-12-06 RX ADMIN — LOSARTAN POTASSIUM 50 MG: 50 TABLET, FILM COATED ORAL at 08:55

## 2024-12-06 RX ADMIN — TIMOLOL MALEATE 1 DROP: 5 SOLUTION/ DROPS OPHTHALMIC at 08:56

## 2024-12-06 RX ADMIN — Medication 200 MG: at 21:31

## 2024-12-06 RX ADMIN — APIXABAN 5 MG: 5 TABLET, FILM COATED ORAL at 21:27

## 2024-12-06 ASSESSMENT — ACTIVITIES OF DAILY LIVING (ADL)
ADLS_ACUITY_SCORE: 51
ADLS_ACUITY_SCORE: 49
ADLS_ACUITY_SCORE: 51
ADLS_ACUITY_SCORE: 49
ADLS_ACUITY_SCORE: 51
ADLS_ACUITY_SCORE: 49
ADLS_ACUITY_SCORE: 51

## 2024-12-06 NOTE — PROGRESS NOTES
[facility]    Medicine Progress Note - Hospitalist Service    Date of Admission:  12/3/2024    Assessment & Plan   Samantha Ramos is a 84 year old male admitted with a periprosthetic femur fracture.  No surgical intervention per orthopaedics.  Medically ready for discharge.    # Periprosthetic knee fracture  - TCU    # Afib  - apixaban    # JONATHAN  - CPAP    # HTN  - losartan         Observation Goals: -diagnostic tests and consults completed and resulted, Nurse to notify provider when observation goals have been met and patient is ready for discharge.  Diet: Regular Diet Adult  Diet      Mora Catheter: Not present  Lines: None     Cardiac Monitoring: None  Code Status: Full Code      Clinically Significant Risk Factors Present on Admission                # Drug Induced Coagulation Defect: home medication list includes an anticoagulant medication    # Hypertension: Noted on problem list  # Chronic heart failure with preserved ejection fraction: heart failure noted on problem list and last echo with EF >50%          # Obesity: Estimated body mass index is 31.75 kg/m  as calculated from the following:    Height as of this encounter: 1.829 m (6').    Weight as of this encounter: 106.2 kg (234 lb 1.6 oz).       # Financial/Environmental Concerns: none         Social Drivers of Health    Depression: At risk (11/14/2022)    [facility]    PHQ-2     PHQ-2 Score: 3   Tobacco Use: Medium Risk (12/3/2024)    Patient History     Smoking Tobacco Use: Former     Smokeless Tobacco Use: Never    [facility]    Social Connections          Disposition Plan     Medically Ready for Discharge:              Kenny Jensen MD  Hospitalist Service  [facility]  Securely message with [employer] (more info)  Text page via [facility] Paging/Directory    ______________________________________________________________________    Interval History   Repots mild knee discomfort.  Denies leg numbness.    Physical Exam   Vital Signs: Temp: 98.7  F (37.1  C) Temp src: Oral BP: (!) 157/70 (Nurse notified;) Pulse: 70   Resp: 16 SpO2: 96 % O2 Device: BiPAP/CPAP    Weight: 234 lbs 1.6 oz    Gen:  lying in bed in no extremis  Neuro: alert, conversant  CV:  bradycardia, regular rhythm  Pulm:  no acute resp distress, ctab anteriorly  GI:  abdomen NTTP    Medical Decision Making             Data

## 2024-12-06 NOTE — DISCHARGE SUMMARY
[facility]  Hospitalist Discharge Summary      Date of Admission:  12/3/2024  Date of Discharge:  12/6/2024  Discharging Provider: Kenny Jensen MD  Discharge Service: Hospitalist Service    Discharge Diagnoses   Right periprosthetic femur fracture    Clinically Significant Risk Factors     # Obesity: Estimated body mass index is 31.96 kg/m  as calculated from the following:    Height as of this encounter: 1.829 m (6').    Weight as of this encounter: 106.9 kg (235 lb 10.8 oz).       Follow-ups Needed After Discharge   Follow-up Appointments       Follow Up and recommended labs and tests      Follow up with group home physician.  The following labs/tests are recommended:   - ensure outpatient orthopaedics followup regarding right femoral periprosthetic fracture                Unresulted Labs Ordered in the Past 30 Days of this Admission       No orders found from 11/3/2024 to 12/4/2024.            Discharge Disposition   Discharged to nursing home  Condition at discharge: Stable    Hospital Course   The patient was admitted with a right periprosthetic femur fracture. He was evaluated by orthopaedics, who recommended conservative management. A knee immobilizer was ordered and he was discharged to TCU.     Consultations This Hospital Stay   ORTHOPEDIC SURGERY IP CONSULT  PHYSICAL THERAPY ADULT IP CONSULT  OCCUPATIONAL THERAPY ADULT IP CONSULT  CARE MANAGEMENT / SOCIAL WORK IP CONSULT  PHYSICAL THERAPY ADULT IP CONSULT  OCCUPATIONAL THERAPY ADULT IP CONSULT    Code Status   Full Code    Time Spent on this Encounter   I, Kenny Jensen MD, personally saw the patient today and spent less than or equal to 30 minutes discharging this patient.       Kenny Jensen MD  [address] [address]  Phone: [phone]  Fax: [phone]  ______________________________________________________________________    Physical Exam    Vital Signs: Temp: 97.3  F (36.3  C) Temp src: Oral BP: 134/65 Pulse: 65   Resp: 18 SpO2: 97 % O2 Device: None (Room air)    Weight: 235 lbs 10.75 oz    See progress note       Primary Care Physician   Eric Franklin    Discharge Orders      Mantoux instructions    Give two-step Mantoux (PPD) Per Facility Policy Yes     Follow Up and recommended labs and tests    Follow up with alf physician.  The following labs/tests are recommended:   - ensure outpatient orthopaedics followup regarding right femoral periprosthetic fracture     Reason for your hospital stay    The patient was admitted with a right periprosthetic femur fracture.  He was evaluated by orthopaedics, who recommended conservative management.  A knee immobilizer was ordered and he was discharged to TCU.     Weight bearing status    Toe touch weight bearing in the right lower extremity ONLY when brace in place (non weight bearing without brace)     Activity - Up with nursing assistance    Toe touch weight bearing for right lower extremity (with brace on)     Physical Therapy Adult Consult    Evaluate and treat as clinically indicated.    Reason:  periprosthetic femur fracture     Occupational Therapy Adult Consult    Evaluate and treat as clinically indicated.    Reason:  periprosthetic femur fracture     Fall precautions     Diet    Follow this diet upon discharge: Current Diet:Orders Placed This Encounter      Regular Diet Adult       Significant Results and Procedures   Most Recent 3 CBC's:  Recent Labs   Lab Test 12/05/24  1211 12/04/24  0816 12/03/24  1541 07/14/24  0508   WBC  --  5.8 7.3 4.9   HGB 11.8* 11.7* 12.8* 13.3   MCV  --  99 100 97   PLT  --  103* 138* 137*     Most Recent 3 BMP's:  Recent Labs   Lab Test 07/14/24  0508 07/13/24  1941 12/31/23  1103    135 140   POTASSIUM 3.8 3.9 4.4   CHLORIDE 99 99 102   CO2 27 24 29   BUN 18.1 16.2 14.1   CR 1.02 0.87 1.16   ANIONGAP 12 12 9   KAREEM 9.2 9.4 9.5   GLC 95 105* 111*   ,    Results for orders placed or performed during the hospital encounter of 12/03/24   XR Knee Right 3 Views    Narrative    EXAM: XR KNEE RIGHT 3 VIEWS  LOCATION: [facility]  DATE: 12/3/2024    INDICATION: Fall, effusion  COMPARISON: None.      Impression    IMPRESSION: Status post right total knee arthroplasty. Acute nondisplaced periprosthetic fracture at the medial femoral condyle. Moderate knee joint effusion. There is normal joint alignment.       Discharge Medications   Current Discharge Medication List        CONTINUE these medications which have NOT CHANGED    Details   apixaban (ELIQUIS) 5 mg Tab tablet [APIXABAN (ELIQUIS) 5 MG TAB TABLET] Take 1 tablet (5 mg total) by mouth 2 (two) times a day.  Qty: 180 tablet, Refills: 0    Associated Diagnoses: Atrial fibrillation with rapid ventricular response (H)      carboxymethylcellulose PF (REFRESH PLUS) 0.5 % ophthalmic solution Place 1 drop into both eyes 4 times daily as needed for dry eyes (Using scheduled in the left eye QAM, but is using daily as needed in both eyes as well.).      cholecalciferol, vitamin D3, 1,000 unit tablet [CHOLECALCIFEROL, VITAMIN D3, 1,000 UNIT TABLET] Take 2,000 Units by mouth daily.       latanoprost (XALATAN) 0.005 % ophthalmic solution [LATANOPROST (XALATAN) 0.005 % OPHTHALMIC SOLUTION] Administer 1 drop to both eyes at bedtime.      losartan (COZAAR) 50 MG tablet Take 50 mg by mouth daily      Magnesium Oxide 250 MG TABS Take 1 tablet by mouth at bedtime.      omeprazole (PRILOSEC) 10 MG capsule Take 1 capsule (10 mg) by mouth daily      sulfacetamide-prednisoLONE (VASOCIDIN) 10-0.23 % ophthalmic solution Place 6 drops into the right ear 2 times daily. OTIC USE      timolol maleate (TIMOPTIC) 0.5 % ophthalmic solution Place 1 drop into the right eye every morning           STOP taking these medications       furosemide (LASIX) 20 MG tablet Comments:   Reason for Stopping:             Allergies    Allergies   Allergen Reactions    Indocin [Indomethacin] Itching

## 2024-12-06 NOTE — PLAN OF CARE
Pt A&O x4. VSS on RA. CPAP overnight. Denies pain. Brace in place. Bruise on right knee and generalized. Tenderness to the touch but declines pain medication. Ambulates A1 with walker and GB, toe touch weight bearing on right. CMS intact. Discharge pending TCU placement.    HLM Admission: 5- Standing (1 or more minutes)  HLM Daily7-Walk 25 feet or more          Problem: Adult Inpatient Plan of Care  Goal: Optimal Comfort and Wellbeing  Outcome: Progressing     Problem: Pain Acute  Goal: Optimal Pain Control and Function  Outcome: Progressing   Goal Outcome Evaluation:

## 2024-12-06 NOTE — PROGRESS NOTES
Plan for discharge to TCU. Denied pain overnight. Continues with toe touch weightbearing as tolerated during day.  PRIMARY DIAGNOSIS: ACUTE PAIN  OUTPATIENT/OBSERVATION GOALS TO BE MET BEFORE DISCHARGE:  1. Pain Status: Pain free.    2. Return to near baseline physical activity: No    3. Cleared for discharge by consultants (if involved): Yes    Discharge Planner Nurse   Safe discharge environment identified: No  Barriers to discharge: No       Entered by: Kasia Ching RN 12/06/2024 6:19 AM     Please review provider order for any additional goals.   Nurse to notify provider when observation goals have been met and patient is ready for discharge.

## 2024-12-06 NOTE — PROGRESS NOTES
Plan for discharge to TCU.  PRIMARY DIAGNOSIS: ACUTE PAIN  OUTPATIENT/OBSERVATION GOALS TO BE MET BEFORE DISCHARGE:  1. Pain Status: Pain free.    2. Return to near baseline physical activity: No    3. Cleared for discharge by consultants (if involved): Yes    Discharge Planner Nurse   Safe discharge environment identified: No  Barriers to discharge: No       Entered by: Kasia Ching RN 12/06/2024 3:44 AM     Please review provider order for any additional goals.   Nurse to notify provider when observation goals have been met and patient is ready for discharge.

## 2024-12-06 NOTE — PROGRESS NOTES
Care Management Discharge Note    Discharge Date: 12/07/2024       Discharge Disposition: Transitional Care    Discharge Services: None    Discharge DME: Other (see comment) (Determined by therapy department)    Discharge Transportation: agency    Private pay costs discussed: transportation costs    Does the patient's insurance plan have a 3 day qualifying hospital stay waiver?  Yes     Which insurance plan 3 day waiver is available? Alternative insurance waiver    Will the waiver be used for post-acute placement? Yes    PAS Confirmation Code: [id number]  Patient/family educated on Medicare website which has current facility and service quality ratings: yes    Education Provided on the Discharge Plan: Yes (Daughter notified of the TCU placement process and CM role within the process)  Persons Notified of Discharge Plans: Daughter, Margo, patient, TCU, Charge RN, Provider  Patient/Family in Agreement with the Plan: yes    Handoff Referral Completed: No, handoff not indicated or clinically appropriate    Additional Information:  0902: GRIFFIN received call from albin Margo, requesting updates regarding TCU. GRIFFIN provided updates on current TCU referrals. Margo states that she will talk with her parents about preferred destination. [facility]     GRIFFIN updated Margo of accepting TCU's. She will speak with her mother to discuss which they prefer.     1043: GRIFFIN updated Margo of acceptance for [facility] today.    Received update form Margo albin, that [facility] is preferred facility.     GRIFFIN requested auth is started.     1136: GRIFFIN received call from [facility], transport    W/c transportation with elevated leg rest scheduled for 12/7 at [id number].    1141: GRIFFIN called Margo albin, updated her of discharge time.     GRIFFIN met with patient at bedside and confirmed discharge plans.    Dennise Sandoval, [facility]

## 2024-12-06 NOTE — PLAN OF CARE
Patient A&O x4 and pleasant. VSS on RA. Pain is denied. Patient ambulates Ax1 with gait belt and walker, TTWB. No PIV access. Tolerating oral intake and voiding appropriately. Last BM on 12/4. Patient resting in bed. Patient is safe, call light within reach.    Griselda Mckeon RN    Problem: Adult Inpatient Plan of Care  Goal: Optimal Comfort and Wellbeing  Outcome: Progressing  Intervention: Monitor Pain and Promote Comfort  Recent Flowsheet Documentation  Taken 12/6/2024 0800 by Griselda Mckeon RN  Pain Management Interventions: declines     Problem: Pain Acute  Goal: Optimal Pain Control and Function  Outcome: Progressing  Intervention: Develop Pain Management Plan  Recent Flowsheet Documentation  Taken 12/6/2024 0800 by Griselda Mckeon RN  Pain Management Interventions: declines  Intervention: Prevent or Manage Pain  Recent Flowsheet Documentation  Taken 12/6/2024 0800 by Griselda Mckeon RN  Medication Review/Management: medications reviewed   Goal Outcome Evaluation:

## 2024-12-07 VITALS
BODY MASS INDEX: 31.92 KG/M2 | OXYGEN SATURATION: 97 % | RESPIRATION RATE: 18 BRPM | HEART RATE: 65 BPM | TEMPERATURE: 97.3 F | HEIGHT: 72 IN | DIASTOLIC BLOOD PRESSURE: 65 MMHG | WEIGHT: 235.67 LBS | SYSTOLIC BLOOD PRESSURE: 134 MMHG

## 2024-12-07 PROCEDURE — 250N000013 HC RX MED GY IP 250 OP 250 PS 637: Performed by: HOSPITALIST

## 2024-12-07 PROCEDURE — 99238 HOSP IP/OBS DSCHRG MGMT 30/<: CPT | Performed by: HOSPITALIST

## 2024-12-07 PROCEDURE — G0378 HOSPITAL OBSERVATION PER HR: HCPCS

## 2024-12-07 RX ADMIN — LOSARTAN POTASSIUM 50 MG: 50 TABLET, FILM COATED ORAL at 09:05

## 2024-12-07 RX ADMIN — APIXABAN 5 MG: 5 TABLET, FILM COATED ORAL at 09:05

## 2024-12-07 RX ADMIN — PANTOPRAZOLE SODIUM 20 MG: 20 TABLET, DELAYED RELEASE ORAL at 09:05

## 2024-12-07 RX ADMIN — SULFACETAMIDE SODIUM AND PREDNISOLONE SODIUM PHOSPHATE 6 DROP: 100; 2.3 SOLUTION/ DROPS OPHTHALMIC at 09:06

## 2024-12-07 RX ADMIN — TIMOLOL MALEATE 1 DROP: 5 SOLUTION/ DROPS OPHTHALMIC at 09:05

## 2024-12-07 ASSESSMENT — ACTIVITIES OF DAILY LIVING (ADL)
ADLS_ACUITY_SCORE: 53
ADLS_ACUITY_SCORE: 51
ADLS_ACUITY_SCORE: 53
ADLS_ACUITY_SCORE: 51
ADLS_ACUITY_SCORE: 53
ADLS_ACUITY_SCORE: 51
ADLS_ACUITY_SCORE: 51

## 2024-12-07 NOTE — PROGRESS NOTES
A&Ox4, able to make needs known, and on room air. Patient sat in chair with bed alarm on hard leg brace on RLE. Denied pain in right leg.

## 2024-12-07 NOTE — PROGRESS NOTES
Physical Therapy Discharge Summary    Reason for therapy discharge:    Discharged to transitional care facility.    Progress towards therapy goal(s). See goals on Care Plan in [facility] electronic health record for goal details.  Goals not met.  Barriers to achieving goals:   discharge from facility.    Therapy recommendation(s):    Continued therapy is recommended.  Rationale/Recommendations:  progress with strength/balance at TCU to facilitate highest level of function.

## 2024-12-07 NOTE — PROGRESS NOTES
[facility]    Medicine Progress Note - Hospitalist Service    Date of Admission:  12/3/2024    Assessment & Plan   Samantha Ramos is a 84 year old male admitted with a periprosthetic femur fracture.  No surgical intervention per orthopaedics.  Medically ready for discharge.    # Periprosthetic knee fracture  - TCU    # Afib  - apixaban    # JONATHAN  - CPAP    # HTN  - losartan         Observation Goals: -diagnostic tests and consults completed and resulted, Nurse to notify provider when observation goals have been met and patient is ready for discharge.  Diet: Regular Diet Adult  Diet      Mora Catheter: Not present  Lines: None     Cardiac Monitoring: None  Code Status: Full Code      Clinically Significant Risk Factors Present on Admission                # Drug Induced Coagulation Defect: home medication list includes an anticoagulant medication    # Hypertension: Noted on problem list  # Chronic heart failure with preserved ejection fraction: heart failure noted on problem list and last echo with EF >50%          # Obesity: Estimated body mass index is 31.96 kg/m  as calculated from the following:    Height as of this encounter: 1.829 m (6').    Weight as of this encounter: 106.9 kg (235 lb 10.8 oz).       # Financial/Environmental Concerns: none         Social Drivers of Health    Depression: At risk (11/14/2022)    Received from [facility], [facility]    PHQ-2     PHQ-2 Score: 3   Tobacco Use: Medium Risk (12/3/2024)    Patient History     Smoking Tobacco Use: Former     Smokeless Tobacco Use: Never    [facility]    Social Connections          Disposition Plan     Medically Ready for Discharge: Ready Now             Kenny Jensen MD  Hospitalist Service  [facility]  Securely message with [employer] (more info)  Text page via [facility] Paging/Directory    ______________________________________________________________________    Interval History   Reports having hip pain associated with sitting in chair last night.  Pain is now 90% better.  Reports mild right knee pain.  Denies chest pain, chest pressure, or dyspnea.    Physical Exam   Vital Signs: Temp: 98.4  F (36.9  C) Temp src: Oral BP: 131/62 Pulse: 75   Resp: 17 SpO2: 95 % O2 Device: BiPAP/CPAP    Weight: 235 lbs 10.75 oz    Gen:  lying in bed in no extremis  Neuro:  alert, conversant  CV:  nl rate, regular rhythm  Pulm:  no acute resp distress, ctab anteriorly    Medical Decision Making             Data

## 2024-12-07 NOTE — PROGRESS NOTES
"PRIMARY DIAGNOSIS: \"GENERIC\" NURSING  OUTPATIENT/OBSERVATION GOALS TO BE MET BEFORE DISCHARGE:  ADLs back to baseline: No    Activity and level of assistance: One assist    Pain status: Pain free.    Return to near baseline physical activity: No     Discharge Planner Nurse   Safe discharge environment identified: Yes  Barriers to discharge: Yes       Entered by: Tone Buchanan RN 12/07/2024 3:09 AM     Please review provider order for any additional goals.   Nurse to notify provider when observation goals have been met and patient is ready for discharge.  "

## 2024-12-07 NOTE — PROGRESS NOTES
Occupational Therapy Discharge Summary    Reason for therapy discharge:    Discharged to transitional care facility.    Progress towards therapy goal(s). See goals on Care Plan in [facility] electronic health record for goal details.  Goals not met.  Barriers to achieving goals:   limited tolerance for therapy and discharge from facility.    Therapy recommendation(s):    Continued therapy is recommended.  Rationale/Recommendations:  Rec OT at TCU to progress incr safety/indep with ADLs and fxl mob.    Elisa Burns OTR/L 12/7/2024

## 2024-12-07 NOTE — PROGRESS NOTES
A&Ox4, patient back to bed with soft leg brace on RLE, and reported left hip pain treated wit PRN tylenol. Denied nausea/vomiting. CPAP in use.

## 2024-12-07 NOTE — PROGRESS NOTES
"PRIMARY DIAGNOSIS: \"GENERIC\" NURSING  OUTPATIENT/OBSERVATION GOALS TO BE MET BEFORE DISCHARGE:  ADLs back to baseline: No    Activity and level of assistance: Assist of one    Pain status: Pain free.    Return to near baseline physical activity: No     Discharge Planner Nurse   Safe discharge environment identified: Yes  Barriers to discharge: No       Entered by: Tone Buchanan RN 12/07/2024 3:34 AM   VSS and afebrile. No complaints of pain overnight. Plan is for discharge today to [facility] TCU, transport has been set up by .   Please review provider order for any additional goals.   Nurse to notify provider when observation goals have been met and patient is ready for discharge.  "

## 2024-12-07 NOTE — DISCHARGE SUMMARY
Patient adequate for transition of care. EMS to transport to TCU. RN to RN report given to facility. Gabriella Bob RN

## 2024-12-07 NOTE — PROGRESS NOTES
Care Management Discharge Note    Discharge Date: 12/07/2024       Discharge Disposition: Transitional Care    Discharge Services: None    Discharge DME: Other (see comment) (Determined by therapy department)    Discharge Transportation: agency    Private pay costs discussed: transportation costs    Does the patient's insurance plan have a 3 day qualifying hospital stay waiver?  No    PAS Confirmation Code: [id number]  Patient/family educated on Medicare website which has current facility and service quality ratings: yes    Education Provided on the Discharge Plan: Yes (Daughter notified of the TCU placement process and CM role within the process)  Persons Notified of Discharge Plans: yes  Patient/Family in Agreement with the Plan: yes    Handoff Referral Completed: yes    Additional Information:  10:26 AM  [facility] No additional needs identified. PAS completed. Orders sent.     ESCOBAR Perrin

## 2024-12-18 ENCOUNTER — LAB REQUISITION (OUTPATIENT)
Dept: LAB | Facility: CLINIC | Age: 84
End: 2024-12-18
Payer: COMMERCIAL

## 2024-12-18 DIAGNOSIS — I10 ESSENTIAL (PRIMARY) HYPERTENSION: ICD-10-CM

## 2025-03-15 ENCOUNTER — HEALTH MAINTENANCE LETTER (OUTPATIENT)
Age: 85
End: 2025-03-15

## 2025-03-19 ENCOUNTER — TRANSCRIBE ORDERS (OUTPATIENT)
Dept: OTHER | Age: 85
End: 2025-03-19

## 2025-03-19 DIAGNOSIS — Z95.3 S/P TAVR (TRANSCATHETER AORTIC VALVE REPLACEMENT), BIOPROSTHETIC: Primary | ICD-10-CM

## 2025-03-31 ENCOUNTER — HOSPITAL ENCOUNTER (OUTPATIENT)
Dept: CARDIAC REHAB | Facility: CLINIC | Age: 85
Discharge: HOME | End: 2025-03-31
Attending: STUDENT IN AN ORGANIZED HEALTH CARE EDUCATION/TRAINING PROGRAM
Payer: COMMERCIAL

## 2025-03-31 PROCEDURE — 93797 PHYS/QHP OP CAR RHAB WO ECG: CPT

## 2025-03-31 PROCEDURE — 93798 PHYS/QHP OP CAR RHAB W/ECG: CPT

## 2025-04-07 ENCOUNTER — HOSPITAL ENCOUNTER (OUTPATIENT)
Dept: CARDIAC REHAB | Facility: CLINIC | Age: 85
Discharge: HOME | End: 2025-04-07
Attending: STUDENT IN AN ORGANIZED HEALTH CARE EDUCATION/TRAINING PROGRAM
Payer: COMMERCIAL

## 2025-04-07 PROCEDURE — 93798 PHYS/QHP OP CAR RHAB W/ECG: CPT

## 2025-04-10 ENCOUNTER — HOSPITAL ENCOUNTER (OUTPATIENT)
Dept: CARDIAC REHAB | Facility: CLINIC | Age: 85
Discharge: HOME | End: 2025-04-10
Attending: STUDENT IN AN ORGANIZED HEALTH CARE EDUCATION/TRAINING PROGRAM
Payer: COMMERCIAL

## 2025-04-10 PROCEDURE — 93798 PHYS/QHP OP CAR RHAB W/ECG: CPT

## 2025-04-14 ENCOUNTER — HOSPITAL ENCOUNTER (OUTPATIENT)
Dept: CARDIAC REHAB | Facility: CLINIC | Age: 85
Discharge: HOME | End: 2025-04-14
Attending: STUDENT IN AN ORGANIZED HEALTH CARE EDUCATION/TRAINING PROGRAM
Payer: COMMERCIAL

## 2025-04-14 PROCEDURE — 93798 PHYS/QHP OP CAR RHAB W/ECG: CPT

## 2025-04-17 ENCOUNTER — HOSPITAL ENCOUNTER (OUTPATIENT)
Dept: CARDIAC REHAB | Facility: CLINIC | Age: 85
Discharge: HOME | End: 2025-04-17
Attending: STUDENT IN AN ORGANIZED HEALTH CARE EDUCATION/TRAINING PROGRAM
Payer: COMMERCIAL

## 2025-04-17 PROCEDURE — 93798 PHYS/QHP OP CAR RHAB W/ECG: CPT

## 2025-04-21 ENCOUNTER — HOSPITAL ENCOUNTER (OUTPATIENT)
Dept: CARDIAC REHAB | Facility: CLINIC | Age: 85
Discharge: HOME | End: 2025-04-21
Attending: STUDENT IN AN ORGANIZED HEALTH CARE EDUCATION/TRAINING PROGRAM
Payer: COMMERCIAL

## 2025-04-21 PROCEDURE — 93798 PHYS/QHP OP CAR RHAB W/ECG: CPT

## 2025-04-24 ENCOUNTER — HOSPITAL ENCOUNTER (OUTPATIENT)
Dept: CARDIAC REHAB | Facility: CLINIC | Age: 85
Discharge: HOME | End: 2025-04-24
Attending: STUDENT IN AN ORGANIZED HEALTH CARE EDUCATION/TRAINING PROGRAM
Payer: COMMERCIAL

## 2025-04-24 PROCEDURE — 93797 PHYS/QHP OP CAR RHAB WO ECG: CPT

## 2025-04-28 ENCOUNTER — HOSPITAL ENCOUNTER (OUTPATIENT)
Dept: CARDIAC REHAB | Facility: CLINIC | Age: 85
Discharge: HOME | End: 2025-04-28
Attending: STUDENT IN AN ORGANIZED HEALTH CARE EDUCATION/TRAINING PROGRAM
Payer: COMMERCIAL

## 2025-04-28 PROCEDURE — 93798 PHYS/QHP OP CAR RHAB W/ECG: CPT

## 2025-05-01 ENCOUNTER — HOSPITAL ENCOUNTER (OUTPATIENT)
Dept: CARDIAC REHAB | Facility: CLINIC | Age: 85
Discharge: HOME | End: 2025-05-01
Attending: STUDENT IN AN ORGANIZED HEALTH CARE EDUCATION/TRAINING PROGRAM
Payer: COMMERCIAL

## 2025-05-01 PROCEDURE — 93798 PHYS/QHP OP CAR RHAB W/ECG: CPT

## 2025-05-05 ENCOUNTER — HOSPITAL ENCOUNTER (OUTPATIENT)
Dept: CARDIAC REHAB | Facility: CLINIC | Age: 85
Discharge: HOME | End: 2025-05-05
Attending: STUDENT IN AN ORGANIZED HEALTH CARE EDUCATION/TRAINING PROGRAM
Payer: COMMERCIAL

## 2025-05-05 PROCEDURE — 93798 PHYS/QHP OP CAR RHAB W/ECG: CPT

## 2025-05-12 ENCOUNTER — HOSPITAL ENCOUNTER (OUTPATIENT)
Dept: CARDIAC REHAB | Facility: CLINIC | Age: 85
Discharge: HOME | End: 2025-05-12
Attending: STUDENT IN AN ORGANIZED HEALTH CARE EDUCATION/TRAINING PROGRAM
Payer: COMMERCIAL

## 2025-05-12 PROCEDURE — 93798 PHYS/QHP OP CAR RHAB W/ECG: CPT

## 2025-05-19 ENCOUNTER — HOSPITAL ENCOUNTER (OUTPATIENT)
Dept: CARDIAC REHAB | Facility: CLINIC | Age: 85
Discharge: HOME | End: 2025-05-19
Attending: STUDENT IN AN ORGANIZED HEALTH CARE EDUCATION/TRAINING PROGRAM
Payer: COMMERCIAL

## 2025-05-19 PROCEDURE — 93798 PHYS/QHP OP CAR RHAB W/ECG: CPT

## 2025-06-02 ENCOUNTER — HOSPITAL ENCOUNTER (OUTPATIENT)
Dept: CARDIAC REHAB | Facility: CLINIC | Age: 85
Discharge: HOME | End: 2025-06-02
Attending: STUDENT IN AN ORGANIZED HEALTH CARE EDUCATION/TRAINING PROGRAM
Payer: COMMERCIAL

## 2025-06-02 PROCEDURE — 93798 PHYS/QHP OP CAR RHAB W/ECG: CPT

## 2025-06-16 ENCOUNTER — HOSPITAL ENCOUNTER (OUTPATIENT)
Dept: CARDIAC REHAB | Facility: CLINIC | Age: 85
Discharge: HOME | End: 2025-06-16
Attending: STUDENT IN AN ORGANIZED HEALTH CARE EDUCATION/TRAINING PROGRAM
Payer: COMMERCIAL

## 2025-06-16 PROCEDURE — 93798 PHYS/QHP OP CAR RHAB W/ECG: CPT

## 2025-06-23 ENCOUNTER — HOSPITAL ENCOUNTER (OUTPATIENT)
Dept: CARDIAC REHAB | Facility: CLINIC | Age: 85
Discharge: HOME | End: 2025-06-23
Attending: STUDENT IN AN ORGANIZED HEALTH CARE EDUCATION/TRAINING PROGRAM
Payer: COMMERCIAL

## 2025-06-23 PROCEDURE — 93798 PHYS/QHP OP CAR RHAB W/ECG: CPT

## 2025-06-30 ENCOUNTER — HOSPITAL ENCOUNTER (OUTPATIENT)
Dept: CARDIAC REHAB | Facility: CLINIC | Age: 85
Discharge: HOME | End: 2025-06-30
Attending: STUDENT IN AN ORGANIZED HEALTH CARE EDUCATION/TRAINING PROGRAM
Payer: COMMERCIAL

## 2025-06-30 PROCEDURE — 93798 PHYS/QHP OP CAR RHAB W/ECG: CPT

## 2025-07-07 ENCOUNTER — HOSPITAL ENCOUNTER (OUTPATIENT)
Dept: CARDIAC REHAB | Facility: CLINIC | Age: 85
Discharge: HOME | End: 2025-07-07
Attending: STUDENT IN AN ORGANIZED HEALTH CARE EDUCATION/TRAINING PROGRAM
Payer: COMMERCIAL

## 2025-07-07 PROCEDURE — 93798 PHYS/QHP OP CAR RHAB W/ECG: CPT

## 2025-07-14 ENCOUNTER — HOSPITAL ENCOUNTER (OUTPATIENT)
Dept: CARDIAC REHAB | Facility: CLINIC | Age: 85
Discharge: HOME | End: 2025-07-14
Attending: STUDENT IN AN ORGANIZED HEALTH CARE EDUCATION/TRAINING PROGRAM
Payer: COMMERCIAL

## 2025-07-14 PROCEDURE — 93798 PHYS/QHP OP CAR RHAB W/ECG: CPT

## 2025-07-28 ENCOUNTER — HOSPITAL ENCOUNTER (OUTPATIENT)
Dept: CARDIAC REHAB | Facility: CLINIC | Age: 85
Discharge: HOME | End: 2025-07-28
Attending: STUDENT IN AN ORGANIZED HEALTH CARE EDUCATION/TRAINING PROGRAM
Payer: COMMERCIAL

## 2025-07-28 PROCEDURE — 93798 PHYS/QHP OP CAR RHAB W/ECG: CPT

## 2025-08-04 ENCOUNTER — HOSPITAL ENCOUNTER (OUTPATIENT)
Dept: CARDIAC REHAB | Facility: CLINIC | Age: 85
Discharge: HOME | End: 2025-08-04
Attending: STUDENT IN AN ORGANIZED HEALTH CARE EDUCATION/TRAINING PROGRAM
Payer: COMMERCIAL

## 2025-08-04 PROCEDURE — 93798 PHYS/QHP OP CAR RHAB W/ECG: CPT
